# Patient Record
Sex: FEMALE | Race: WHITE | Employment: PART TIME | URBAN - METROPOLITAN AREA
[De-identification: names, ages, dates, MRNs, and addresses within clinical notes are randomized per-mention and may not be internally consistent; named-entity substitution may affect disease eponyms.]

---

## 2017-02-27 DIAGNOSIS — E03.9 HYPOTHYROIDISM: ICD-10-CM

## 2017-02-28 RX ORDER — LEVOTHYROXINE SODIUM 175 UG/1
TABLET ORAL
Qty: 90 TABLET | Refills: 1 | Status: SHIPPED | OUTPATIENT
Start: 2017-02-28 | End: 2017-11-29

## 2017-02-28 NOTE — TELEPHONE ENCOUNTER
Prescription approved per [facility], [facility] or [facility] refill protocol.  Triny Encinas RN  [employer]

## 2017-02-28 NOTE — TELEPHONE ENCOUNTER
Levothyroxine 175 mcg     Last Written Prescription Date: 2/1/16  Last Quantity: 90, # refills: 3  Last Office Visit with G, P or [facility] prescribing provider: 12/9/16        TSH   Date Value Ref Range Status   09/21/2016 2.74 0.40 - 5.00 mU/L Final

## 2017-09-03 DIAGNOSIS — E03.9 HYPOTHYROIDISM: ICD-10-CM

## 2017-09-06 RX ORDER — LEVOTHYROXINE SODIUM 175 UG/1
TABLET ORAL
Qty: 90 TABLET | Refills: 0 | Status: SHIPPED | OUTPATIENT
Start: 2017-09-06 | End: 2017-11-29

## 2017-09-06 NOTE — TELEPHONE ENCOUNTER
Medication is being filled for 1 time refill only due to:  Patient needs labs needs thyroid check call for appointment.

## 2017-11-19 DIAGNOSIS — E27.1 AUTOIMMUNE ADDISON'S DISEASE (H): ICD-10-CM

## 2017-11-19 DIAGNOSIS — E03.9 HYPOTHYROIDISM: ICD-10-CM

## 2017-11-21 RX ORDER — PREDNISONE 5 MG/1
TABLET ORAL
Qty: 180 TABLET | Refills: 3 | Status: SHIPPED | OUTPATIENT
Start: 2017-11-21 | End: 2017-11-29

## 2017-11-21 RX ORDER — LEVOTHYROXINE SODIUM 175 UG/1
TABLET ORAL
Qty: 90 TABLET | Refills: 0 | Status: SHIPPED | OUTPATIENT
Start: 2017-11-21 | End: 2017-11-29

## 2017-11-21 NOTE — TELEPHONE ENCOUNTER
12-9-2016 last OV  Requested Prescriptions   Pending Prescriptions Disp Refills     levothyroxine (SYNTHROID/LEVOTHROID) 175 MCG tablet [Pharmacy Med Name: LEVOTHYROXINE SODIUM 175MCG TABS] 90 tablet 0     Sig: TAKE ONE TABLET BY MOUTH EVERY DAY (NEED TO BE SEEN IN CLINIC FOR FURTHER REFILLS)    Thyroid Protocol Failed    11/19/2017 11:20 AM       Failed - Normal TSH on file in past 12 months    Recent Labs   Lab Test  09/21/16   1614   TSH  2.74             Passed - Patient is 12 years or older       Passed - Recent or future visit with authorizing provider's specialty    Patient had office visit in the last year or has a visit in the next 30 days with authorizing provider.  See chart review.              Passed - No active pregnancy on record    If patient is pregnant or has had a positive pregnancy test, please check TSH.         Passed - No positive pregnancy test in past 12 months    If patient is pregnant or has had a positive pregnancy test, please check TSH.          predniSONE (DELTASONE) 5 MG tablet [Pharmacy Med Name: PREDNISONE 5MG TABS] 180 tablet 3     Sig: TAKE ONE TO ONE AND ONE-HALF TABLETS BY MOUTH DAILY    There is no refill protocol information for this order        Prednisone--patient has an appointment 11-29-17      Last Written Prescription Date:  3-22-17  Last Fill Quantity: 135,   # refills: 2   Office visit:  12-9-2016 for pre op exam  Next 5 appointments (look out 90 days)     Nov 29, 2017  8:00 AM CST   [facility]    [address] [address] [phone]                   Routing refill request to provider for review/approval because:  Drug not on the [facility], [facility] or [facility] refill protocol or controlled substance

## 2017-11-29 ENCOUNTER — OFFICE VISIT (OUTPATIENT)
Dept: FAMILY MEDICINE | Facility: CLINIC | Age: 47
End: 2017-11-29
Payer: COMMERCIAL

## 2017-11-29 VITALS
OXYGEN SATURATION: 98 % | SYSTOLIC BLOOD PRESSURE: 92 MMHG | RESPIRATION RATE: 16 BRPM | DIASTOLIC BLOOD PRESSURE: 62 MMHG | HEIGHT: 69 IN | TEMPERATURE: 96.8 F | BODY MASS INDEX: 21.55 KG/M2 | HEART RATE: 53 BPM | WEIGHT: 145.5 LBS

## 2017-11-29 DIAGNOSIS — Z00.00 ROUTINE GENERAL MEDICAL EXAMINATION AT A HEALTH CARE FACILITY: Primary | ICD-10-CM

## 2017-11-29 DIAGNOSIS — E27.40 CHRONIC ADRENAL INSUFFICIENCY (H): ICD-10-CM

## 2017-11-29 DIAGNOSIS — E06.3 HYPOTHYROIDISM DUE TO HASHIMOTO'S THYROIDITIS: ICD-10-CM

## 2017-11-29 DIAGNOSIS — Z97.5 IUD (INTRAUTERINE DEVICE) IN PLACE: ICD-10-CM

## 2017-11-29 DIAGNOSIS — E03.9 ACQUIRED HYPOTHYROIDISM: ICD-10-CM

## 2017-11-29 DIAGNOSIS — E27.1 AUTOIMMUNE ADDISON'S DISEASE (H): ICD-10-CM

## 2017-11-29 LAB — TSH SERPL DL<=0.005 MIU/L-ACNC: 3.14 MU/L (ref 0.4–4)

## 2017-11-29 PROCEDURE — 99396 PREV VISIT EST AGE 40-64: CPT | Performed by: FAMILY MEDICINE

## 2017-11-29 PROCEDURE — 36415 COLL VENOUS BLD VENIPUNCTURE: CPT | Performed by: FAMILY MEDICINE

## 2017-11-29 PROCEDURE — 87624 HPV HI-RISK TYP POOLED RSLT: CPT | Performed by: FAMILY MEDICINE

## 2017-11-29 PROCEDURE — G0145 SCR C/V CYTO,THINLAYER,RESCR: HCPCS | Performed by: FAMILY MEDICINE

## 2017-11-29 PROCEDURE — 84443 ASSAY THYROID STIM HORMONE: CPT | Performed by: FAMILY MEDICINE

## 2017-11-29 RX ORDER — PREDNISONE 5 MG/1
TABLET ORAL
Qty: 180 TABLET | Refills: 3 | Status: SHIPPED | OUTPATIENT
Start: 2017-11-29 | End: 2018-05-05

## 2017-11-29 RX ORDER — LEVOTHYROXINE SODIUM 175 UG/1
175 TABLET ORAL DAILY
Qty: 90 TABLET | Refills: 3 | Status: SHIPPED | OUTPATIENT
Start: 2017-11-29 | End: 2018-08-21

## 2017-11-29 RX ORDER — FLUDROCORTISONE ACETATE 0.1 MG/1
TABLET ORAL
Qty: 270 TABLET | Refills: 2 | Status: SHIPPED | OUTPATIENT
Start: 2017-11-29 | End: 2018-08-21

## 2017-11-29 NOTE — PATIENT INSTRUCTIONS
You need a mammogram!  Please call to schedule this.  [facility] Mammogram every Friday morning at our clinic [phone]  or  [facility]   Appointment line [phone]  or  [facility] Appointment line [phone]    Preventive Health Recommendations  Female Ages 40 to 49    Yearly exam:     See your health care provider every year in order to  1. Review health changes.   2. Discuss preventive care.    3. Review your medicines if your doctor prescribed any.      Get a Pap test every three years (unless you have an abnormal result and your provider advises testing more often).      If you get Pap tests with HPV test, you only need to test every 5 years, unless you have an abnormal result. You do not need a Pap test if your uterus was removed (hysterectomy) and you have not had cancer.      You should be tested each year for STDs (sexually transmitted diseases), if you're at risk.       Ask your doctor if you should have a mammogram.      Have a colonoscopy (test for colon cancer) if someone in your family has had colon cancer or polyps before age 50.       Have a cholesterol test every 5 years.       Have a diabetes test (fasting glucose) after age 45. If you are at risk for diabetes, you should have this test every 3 years.    Shots: Get a flu shot each year. Get a tetanus shot every 10 years.     Nutrition:     Eat at least 5 servings of fruits and vegetables each day.    Eat whole-grain bread, whole-wheat pasta and brown rice instead of white grains and rice.    Talk to your provider about Calcium and Vitamin D.     Lifestyle    Exercise at least 150 minutes a week (an average of 30 minutes a day, 5 days a week). This will help you control your weight and prevent disease.    Limit alcohol to one drink per day.    No smoking.     Wear sunscreen to prevent skin cancer.    See your dentist every six months for an exam and cleaning.

## 2017-11-29 NOTE — LETTER
December 7, 2017    Triny Florian  [address] [facility] [facility] [address]    Dear Triny,  We are happy to inform you that your PAP smear result from 11/29/17 is normal.  We are now able to do a follow up test on PAP smears. The DNA test is for HPV (Human Papilloma Virus). Cervical cancer is closely linked with certain types of HPV. Your result showed no evidence of high risk HPV.  Therefore we recommend you return in 5 years for your next pap smear and HPV test.  You will still need to return to the clinic every year for an annual exam and other preventive tests.  Please contact the clinic at [phone] with any questions.  Sincerely,    Mishel Mosher MD/[facility]

## 2017-11-29 NOTE — NURSING NOTE
"Chief Complaint   Patient presents with     Physical       Initial BP 92/62  Pulse 53  Temp 96.8  F (36  C) (Tympanic)  Resp 16  Ht 5' 9\" (1.753 m)  Wt 145 lb 8 oz (66 kg)  SpO2 98%  BMI 21.49 kg/m2 Estimated body mass index is 21.49 kg/(m^2) as calculated from the following:    Height as of this encounter: 5' 9\" (1.753 m).    Weight as of this encounter: 145 lb 8 oz (66 kg).  Medication Reconciliation: complete   .Atiya LE      "

## 2017-11-29 NOTE — MR AVS SNAPSHOT
After Visit Summary   11/29/2017    Triny Florian    MRN: [id number]           Patient Information     Date Of Birth          1970        Visit Information        Provider Department      11/29/2017 8:00 AM Mishel Mosher MD [facility]        Today's Diagnoses     Routine general medical examination at a health care facility    -  1    Autoimmune [place]'s disease (H)        Acquired hypothyroidism        Corticoadrenal insufficiency        IUD (intrauterine device) in place          Care Instructions    You need a mammogram!  Please call to schedule this.  [facility] Mammogram every Friday morning at our clinic [phone]  or  [facility]   Appointment line [phone]  or  [facility] Appointment line [phone]    Preventive Health Recommendations  Female Ages 40 to 49    Yearly exam:     See your health care provider every year in order to  1. Review health changes.   2. Discuss preventive care.    3. Review your medicines if your doctor prescribed any.      Get a Pap test every three years (unless you have an abnormal result and your provider advises testing more often).      If you get Pap tests with HPV test, you only need to test every 5 years, unless you have an abnormal result. You do not need a Pap test if your uterus was removed (hysterectomy) and you have not had cancer.      You should be tested each year for STDs (sexually transmitted diseases), if you're at risk.       Ask your doctor if you should have a mammogram.      Have a colonoscopy (test for colon cancer) if someone in your family has had colon cancer or polyps before age 50.       Have a cholesterol test every 5 years.       Have a diabetes test (fasting glucose) after age 45. If you are at risk for diabetes, you should have this test every 3 years.    Shots: Get a flu shot each year. Get a tetanus shot every 10 years.     Nutrition:  "    Eat at least 5 servings of fruits and vegetables each day.    Eat whole-grain bread, whole-wheat pasta and brown rice instead of white grains and rice.    Talk to your provider about Calcium and Vitamin D.     Lifestyle    Exercise at least 150 minutes a week (an average of 30 minutes a day, 5 days a week). This will help you control your weight and prevent disease.    Limit alcohol to one drink per day.    No smoking.     Wear sunscreen to prevent skin cancer.    See your dentist every six months for an exam and cleaning.          Follow-ups after your visit        Who to contact     If you have questions or need follow up information about today's clinic visit or your schedule please contact [facility] directly at [phone].  Normal or non-critical lab and imaging results will be communicated to you by PharmAssistanthart, letter or phone within 4 business days after the clinic has received the results. If you do not hear from us within 7 days, please contact the clinic through PharmAssistanthart or phone. If you have a critical or abnormal lab result, we will notify you by phone as soon as possible.  Submit refill requests through [employer] or call your pharmacy and they will forward the refill request to us. Please allow 3 business days for your refill to be completed.          Additional Information About Your Visit        [employer] Information     [employer] lets you send messages to your doctor, view your test results, renew your prescriptions, schedule appointments and more. To sign up, go to [email or web address] . Click on \"Log in\" on the left side of the screen, which will take you to the Welcome page. Then click on \"Sign up Now\" on the right side of the page.     You will be asked to enter the access code listed below, as well as some personal information. Please follow the directions to create your username and password.     Your access code is: [email or web address]  Expires: 2/27/2018  8:35 AM   " "  Your access code will  in 90 days. If you need help or a new code, please call your [place] clinic or [phone].        Care EveryWhere ID     This is your Care EveryWhere ID. This could be used by other organizations to access your [place] medical records  [id number]        Your Vitals Were     Pulse Temperature Respirations Height Pulse Oximetry BMI (Body Mass Index)    53 96.8  F (36  C) (Tympanic) 16 5' 9\" (1.753 m) 98% 21.49 kg/m2       Blood Pressure from Last 3 Encounters:   17 92/62   16 104/60   16 98/58    Weight from Last 3 Encounters:   17 145 lb 8 oz (66 kg)   16 162 lb (73.5 kg)   16 154 lb (69.9 kg)              We Performed the Following     HPV High Risk Types DNA Cervical     Pap imaged thin layer screen with HPV - recommended age 30 - 65 years (select HPV order below)     TSH with free T4 reflex          Today's Medication Changes          These changes are accurate as of: 17  8:35 AM.  If you have any questions, ask your nurse or doctor.               These medicines have changed or have updated prescriptions.        Dose/Directions    levothyroxine 175 MCG tablet   Commonly known as:  SYNTHROID/LEVOTHROID   This may have changed:  Another medication with the same name was removed. Continue taking this medication, and follow the directions you see here.   Used for:  Hypothyroidism   Changed by:  Mishel Mosher MD        TAKE ONE TABLET BY MOUTH EVERY DAY (NEED TO BE SEEN IN CLINIC FOR FURTHER REFILLS)   Quantity:  90 tablet   Refills:  0       predniSONE 5 MG tablet   Commonly known as:  DELTASONE   This may have changed:  Another medication with the same name was removed. Continue taking this medication, and follow the directions you see here.   Used for:  Autoimmune Bernard's disease (H)   Changed by:  Mishel Mosher MD        TAKE ONE TO ONE AND ONE-HALF TABLETS BY MOUTH DAILY   Quantity:  180 tablet   Refills:  3         Stop " taking these medicines if you haven't already. Please contact your care team if you have questions.     promethazine 25 MG tablet   Commonly known as:  PHENERGAN   Stopped by:  Mishel Mosher MD                    Primary Care Provider Office Phone # Fax #    Mishel Mosher [phone] [address] [facility] [address]        Equal Access to Services     [facility]: Hadii aad ku hadasho Soomaali, waaxda luqadaha, qaybta kaalmada adeegyada, waxay idiin hayaan adeeg kharash la'aan . So [facility] [phone].    ATENCIÓN: Si habla español, tiene a beck disposición servicios gratuitos de asistencia lingüística. Melbaame al [phone].    We comply with applicable federal civil rights laws and [place] laws. We do not discriminate on the basis of race, color, national origin, age, disability, sex, sexual orientation, or gender identity.            Thank you!     Thank you for choosing [facility]  for your care. Our goal is always to provide you with excellent care. Hearing back from our patients is one way we can continue to improve our services. Please take a few minutes to complete the written survey that you may receive in the mail after your visit with us. Thank you!             Your Updated Medication List - Protect others around you: Learn how to safely use, store and throw away your medicines at www.disposemymeds.org.          This list is accurate as of: 11/29/17  8:35 AM.  Always use your most recent med list.                   Brand Name Dispense Instructions for use Diagnosis    dexamethasone 4 MG/ML injection    DECADRON     Apply 4 mg topically once. IM injection for emergency only    Autoimmune Harding's disease (H)       fludrocortisone 0.1 MG tablet    FLORINEF    270 tablet    TAKE ONE TO THREE TABLETS BY MOUTH DAILY AS NEEDED    Autoimmune Harding's disease (H)       levothyroxine 175 MCG tablet    SYNTHROID/LEVOTHROID    90 tablet     TAKE ONE TABLET BY MOUTH EVERY DAY (NEED TO BE SEEN IN CLINIC FOR FURTHER REFILLS)    Hypothyroidism       predniSONE 5 MG tablet    DELTASONE    180 tablet    TAKE ONE TO ONE AND ONE-HALF TABLETS BY MOUTH DAILY    Autoimmune [place]'s disease (H)       valACYclovir 1000 mg tablet    VALTREX    30 tablet    Take 1 tablet (1,000 mg) by mouth 2 times daily    Recurrent herpes simplex

## 2017-11-29 NOTE — PROGRESS NOTES
SUBJECTIVE:   CC: Triny Florian is an 47 year old woman who presents for preventive health visit.     Physical   Annual:     Getting at least 3 servings of Calcium per day::  NO    Bi-annual eye exam::  Yes    Dental care twice a year::  Yes    Sleep apnea or symptoms of sleep apnea::  None    Diet::  Regular (no restrictions)    Frequency of exercise::  4-5 days/week    Duration of exercise::  45-60 minutes    Taking medications regularly::  Yes    Medication side effects::  Not applicable    Additional concerns today::  No              Today's PHQ-2 Score:   PHQ-2 ( 1999 [employer]) 11/29/2017   Q1: Little interest or pleasure in doing things 0   Q2: Feeling down, depressed or hopeless 0   PHQ-2 Score 0   Q1: Little interest or pleasure in doing things Not at all   Q2: Feeling down, depressed or hopeless Not at all   PHQ-2 Score 0       Abuse: Current or Past(Physical, Sexual or Emotional)- No  Do you feel safe in your environment - Yes    Social History   Substance Use Topics     Smoking status: Never Smoker     Smokeless tobacco: Never Used     Alcohol use 0.0 oz/week     0 Standard drinks or equivalent per week      Comment: socially     The patient does not drink >3 drinks per day nor >7 drinks per week.    Reviewed orders with patient.  Reviewed health maintenance and updated orders accordingly - Yes  Labs reviewed in EPIC  BP Readings from Last 3 Encounters:   11/29/17 92/62   12/09/16 104/60   09/21/16 98/58    Wt Readings from Last 3 Encounters:   11/29/17 145 lb 8 oz (66 kg)   12/09/16 162 lb (73.5 kg)   09/21/16 154 lb (69.9 kg)                      Patient under age 50, mutual decision reflected in health maintenance.        Pertinent mammograms are reviewed under the imaging tab.  History of abnormal Pap smear: NO - age 30- 65 PAP every 3 years recommended  NO - age 30-65 PAP every 5 years with negative HPV co-testing recommended  Last 3 Pap and HPV Results:   PAP / HPV 1/14/2015 5/23/2012   PAP NIL  "NIL       Reviewed and updated as needed this visit by clinical staff  Tobacco  Allergies  Meds  Problems  Med Hx  Surg Hx  Fam Hx  Soc Hx          Reviewed and updated as needed this visit by Provider  Tobacco  Allergies  Meds  Problems            Review of Systems  C: NEGATIVE for fever, chills, change in weight  I: NEGATIVE for worrisome rashes, moles or lesions  E: NEGATIVE for vision changes or irritation  ENT: NEGATIVE for ear, mouth and throat problems  R: NEGATIVE for significant cough or SOB  B: NEGATIVE for masses, tenderness or discharge  CV: NEGATIVE for chest pain, palpitations or peripheral edema  GI: NEGATIVE for nausea, abdominal pain, heartburn, or change in bowel habits  : NEGATIVE for unusual urinary or vaginal symptoms. Periods are regular.  M: NEGATIVE for significant arthralgias or myalgia  N: NEGATIVE for weakness, dizziness or paresthesias  P: NEGATIVE for changes in mood or affect     OBJECTIVE:   BP 92/62  Pulse 53  Temp 96.8  F (36  C) (Tympanic)  Resp 16  Ht 5' 9\" (1.753 m)  Wt 145 lb 8 oz (66 kg)  LMP 11/29/2017  SpO2 98%  BMI 21.49 kg/m2  Physical Exam  GENERAL: healthy, alert and no distress  EYES: Eyes grossly normal to inspection, PERRL and conjunctivae and sclerae normal  HENT: ear canals and TM's normal, nose and mouth without ulcers or lesions  NECK: no adenopathy, no asymmetry, masses, or scars and thyroid normal to palpation  RESP: lungs clear to auscultation - no rales, rhonchi or wheezes  BREAST: normal without masses, tenderness or nipple discharge and no palpable axillary masses or adenopathy  CV: regular rate and rhythm, normal S1 S2, no S3 or S4, no murmur, click or rub, no peripheral edema and peripheral pulses strong  ABDOMEN: soft, nontender, no hepatosplenomegaly, no masses and bowel sounds normal   (female): normal female external genitalia, normal urethral meatus, vaginal mucosa pink, moist, well rugated, and normal cervix/adnexa/uterus without " "masses or discharge  MS: no gross musculoskeletal defects noted, no edema  SKIN: no suspicious lesions or rashes  NEURO: Normal strength and tone, mentation intact and speech normal  PSYCH: mentation appears normal, affect normal/bright    ASSESSMENT/PLAN:   Triny was seen today for physical.    Diagnoses and all orders for this visit:    Routine general medical examination at a health care facility  -     Pap imaged thin layer screen with HPV - recommended age 30 - 65 years (select HPV order below)  -     HPV High Risk Types DNA Cervical    Autoimmune Dukes's disease (H)  -     fludrocortisone (FLORINEF) 0.1 MG tablet; TAKE ONE TO THREE TABLETS BY MOUTH DAILY AS NEEDED  -     predniSONE (DELTASONE) 5 MG tablet; TAKE ONE TO ONE AND ONE-HALF TABLETS BY MOUTH DAILY    Acquired hypothyroidism  -     TSH with free T4 reflex    Corticoadrenal insufficiency    IUD (intrauterine device) in place    Autoimmune Dukes's disease (H)  Comments:  diagnosed in 1998.  was on TPN.  thought was hyperemesis.  long diagnosis - difficult to diagnose.  Orders:  -     fludrocortisone (FLORINEF) 0.1 MG tablet; TAKE ONE TO THREE TABLETS BY MOUTH DAILY AS NEEDED  -     predniSONE (DELTASONE) 5 MG tablet; TAKE ONE TO ONE AND ONE-HALF TABLETS BY MOUTH DAILY    Hypothyroidism due to Hashimoto's thyroiditis  -     levothyroxine (SYNTHROID/LEVOTHROID) 175 MCG tablet; Take 1 tablet (175 mcg) by mouth daily      No additional charges; routine refills for Bernard's and Hypothyroid without changes.  Labs for thyroid done.    COUNSELING:  Reviewed preventive health counseling, as reflected in patient instructions         reports that she has never smoked. She has never used smokeless tobacco.    Estimated body mass index is 21.49 kg/(m^2) as calculated from the following:    Height as of this encounter: 5' 9\" (1.753 m).    Weight as of this encounter: 145 lb 8 oz (66 kg).         Counseling Resources:  ATP IV Guidelines  Pooled Cohorts Equation " Calculator  Breast Cancer Risk Calculator  FRAX Risk Assessment  ICSI Preventive Guidelines  Dietary Guidelines for Americans, 2010  [employer]'s MyPlate  ASA Prophylaxis  Lung CA Screening    Mishel Mosher MD  [facility] submitted by the patient on 11/29/2017   PHQ-2 Score: 0

## 2017-11-30 LAB
COPATH REPORT: NORMAL
PAP: NORMAL

## 2017-12-01 LAB
FINAL DIAGNOSIS: NORMAL
HPV HR 12 DNA CVX QL NAA+PROBE: NEGATIVE
HPV16 DNA SPEC QL NAA+PROBE: NEGATIVE
HPV18 DNA SPEC QL NAA+PROBE: NEGATIVE
SPECIMEN DESCRIPTION: NORMAL

## 2017-12-11 ENCOUNTER — HOSPITAL ENCOUNTER (OUTPATIENT)
Dept: MAMMOGRAPHY | Facility: CLINIC | Age: 47
Discharge: HOME | End: 2017-12-11
Attending: FAMILY MEDICINE | Admitting: FAMILY MEDICINE
Payer: COMMERCIAL

## 2017-12-11 DIAGNOSIS — Z12.31 VISIT FOR SCREENING MAMMOGRAM: ICD-10-CM

## 2017-12-11 PROCEDURE — G0202 SCR MAMMO BI INCL CAD: HCPCS

## 2018-01-26 ENCOUNTER — OFFICE VISIT (OUTPATIENT)
Dept: FAMILY MEDICINE | Facility: CLINIC | Age: 48
End: 2018-01-26
Payer: COMMERCIAL

## 2018-01-26 ENCOUNTER — TELEPHONE (OUTPATIENT)
Dept: FAMILY MEDICINE | Facility: CLINIC | Age: 48
End: 2018-01-26

## 2018-01-26 VITALS
SYSTOLIC BLOOD PRESSURE: 94 MMHG | BODY MASS INDEX: 21.71 KG/M2 | WEIGHT: 147 LBS | RESPIRATION RATE: 16 BRPM | TEMPERATURE: 99.8 F | DIASTOLIC BLOOD PRESSURE: 62 MMHG | HEART RATE: 69 BPM | OXYGEN SATURATION: 97 %

## 2018-01-26 DIAGNOSIS — J01.10 ACUTE NON-RECURRENT FRONTAL SINUSITIS: Primary | ICD-10-CM

## 2018-01-26 DIAGNOSIS — R05.9 COUGH: ICD-10-CM

## 2018-01-26 LAB
FLUAV+FLUBV AG SPEC QL: NEGATIVE
FLUAV+FLUBV AG SPEC QL: NEGATIVE
SPECIMEN SOURCE: NORMAL

## 2018-01-26 PROCEDURE — 99213 OFFICE O/P EST LOW 20 MIN: CPT | Performed by: FAMILY MEDICINE

## 2018-01-26 PROCEDURE — 87804 INFLUENZA ASSAY W/OPTIC: CPT | Performed by: FAMILY MEDICINE

## 2018-01-26 RX ORDER — AMOXICILLIN AND CLAVULANATE POTASSIUM 500; 125 MG/1; MG/1
1 TABLET, FILM COATED ORAL 3 TIMES DAILY
Qty: 30 TABLET | Refills: 0 | Status: SHIPPED | OUTPATIENT
Start: 2018-01-26 | End: 2018-05-05

## 2018-01-26 NOTE — MR AVS SNAPSHOT
After Visit Summary   1/26/2018    Triny Florian    MRN: [id number]           Patient Information     Date Of Birth          1970        Visit Information        Provider Department      1/26/2018 1:50 PM Emma Hui DO [facility]        Today's Diagnoses     Acute non-recurrent frontal sinusitis    -  1    Cough          Care Instructions      Acute Bacterial Rhinosinusitis (ABRS)    Acute bacterial rhinosinusitis (ABRS) is an infection of your nasal cavity and sinuses. It s caused by bacteria. Acute means that you ve had symptoms for less than 4 weeks, but possibly up to 12 weeks.  Understanding your sinuses  The nasal cavity is the large air-filled space behind your nose. The sinuses are a group of spaces formed by the bones of your face. They connect with your nasal cavity. ABRS causes the tissue lining these spaces to become inflamed. Mucus may not drain normally. This leads to facial pain and other symptoms.  What causes ABRS?  ABRS most often follows an upper respiratory infection caused by a virus. Bacteria then infect the lining of your nasal cavity and sinuses. But you can also get ABRS if you have:    Nasal allergies    Long-term nasal swelling and congestion not caused by allergies    Blockage in the nose  Symptoms of ABRS  The symptoms of ABRS may be different for each person and include:    Nasal congestion or blockage    Pain or pressure in the face    Thick, colored drainage from the nose  Other symptoms may include:    Runny nose    Fluid draining from the nose down the throat (postnasal drip)    Headache    Cough    Pain    Fever  Diagnosing ABRS  ABRS may be diagnosed if you ve had an upper respiratory infection like a cold and cough for 10 or more days without improvement or with worsening symptoms. Your healthcare provider will ask about your symptoms and your medical history. The provider will check your vital signs, including your  temperature. You ll have a physical exam. The healthcare provider will check your ears, nose, and throat. You likely won t need any tests. If ABRS comes back, you may have a culture or other tests.  Treatment for ABRS  Treatment may include:    Antibiotic medicine. This is for symptoms that last for at least 10 to 14 days.    Nasal corticosteroid medicine. Drops or spray used in the nose can lessen swelling and congestion.    Over-the-counter pain medicine. This is to lessen sinus pain and pressure.    Nasal decongestant medicine. Spray or drops may help to lessen congestion. Do not use them for more than a few days.    Salt wash (saline irrigation). This can help to loosen mucus.  Possible complications of ABRS  ABRS may come back or become long-term (chronic). In rare cases, ABRS may cause complications such as:     Inflamed tissue around the brain and spinal cord (meningitis)    Inflamed tissue around the eyes (orbital cellulitis)    Inflamed bones around the sinuses (osteitis)  These problems may need to be treated in a hospital with intravenous (IV) antibiotic medicine or surgery.  When to call the healthcare provider  Call your healthcare provider if you have any of the following:    Symptoms that don t get better, or get worse    Symptoms that don t get better after 3 to 5 days on antibiotics    Trouble seeing    Swelling around your eyes    Confusion or trouble staying awake   Date Last Reviewed: 5/1/2017 2000-2017 [employer]. [address] [place] [address]. All rights reserved. This information is not intended as a substitute for professional medical care. Always follow your healthcare professional's instructions.                Follow-ups after your visit        Follow-up notes from your care team     Return if symptoms worsen or fail to improve.      Who to contact     If you have questions or need follow up information about today's clinic visit or your schedule please  "contact [facility] directly at [phone].  Normal or non-critical lab and imaging results will be communicated to you by MyChart, letter or phone within 4 business days after the clinic has received the results. If you do not hear from us within 7 days, please contact the clinic through MyChart or phone. If you have a critical or abnormal lab result, we will notify you by phone as soon as possible.  Submit refill requests through [employer] or call your pharmacy and they will forward the refill request to us. Please allow 3 business days for your refill to be completed.          Additional Information About Your Visit        [employer] Information     [employer] lets you send messages to your doctor, view your test results, renew your prescriptions, schedule appointments and more. To sign up, go to [email or web address] . Click on \"Log in\" on the left side of the screen, which will take you to the Welcome page. Then click on \"Sign up Now\" on the right side of the page.     You will be asked to enter the access code listed below, as well as some personal information. Please follow the directions to create your username and password.     Your access code is: [email or web address]  Expires: 2018  8:35 AM     Your access code will  in 90 days. If you need help or a new code, please call your [place] clinic or [phone].        Care EveryWhere ID     This is your Care EveryWhere ID. This could be used by other organizations to access your [place] medical records  [id number]        Your Vitals Were     Pulse Temperature Respirations Pulse Oximetry Breastfeeding? BMI (Body Mass Index)    69 99.8  F (37.7  C) (Tympanic) 16 97% No 21.71 kg/m2       Blood Pressure from Last 3 Encounters:   18 94/62   17 92/62   16 104/60    Weight from Last 3 Encounters:   18 147 lb (66.7 kg)   17 145 lb 8 oz (66 kg)   16 162 lb (73.5 kg)              We Performed the " Following     Influenza A/B antigen          Today's Medication Changes          These changes are accurate as of 1/26/18  2:20 PM.  If you have any questions, ask your nurse or doctor.               Start taking these medicines.        Dose/Directions    amoxicillin-clavulanate 500-125 MG per tablet   Commonly known as:  AUGMENTIN   Used for:  Acute non-recurrent frontal sinusitis   Started by:  Emma Hui,         Dose:  1 tablet   Take 1 tablet by mouth 3 times daily   Quantity:  30 tablet   Refills:  0            Where to get your medicines      These medications were sent to [facility] [id number] [place] [address] [address] AT [facility] [address] [facility] [address]     Phone:  [phone]     amoxicillin-clavulanate 500-125 MG per tablet                Primary Care Provider Office Phone # Fax #    Mishel Mosher [phone] [address] [facility] [address]        Equal Access to Services     KIARA VIERA : Hadii aad ku hadasho Soomaali, waaxda luqadaha, qaybta kaalmada adeegyada, waxay idiin hayaan meggan arvizu . So [facility] [phone].    ATENCIÓN: Si habla español, tiene a beck disposición servicios gratuitos de asistencia lingüística. Chaparrita al [phone].    We comply with applicable federal civil rights laws and [place] laws. We do not discriminate on the basis of race, color, national origin, age, disability, sex, sexual orientation, or gender identity.            Thank you!     Thank you for choosing [facility]  for your care. Our goal is always to provide you with excellent care. Hearing back from our patients is one way we can continue to improve our services. Please take a few minutes to complete the written survey that you may receive in the mail after your visit with us. Thank you!             Your Updated Medication List - Protect others around you: Learn how to safely  use, store and throw away your medicines at www.disposemymeds.org.          This list is accurate as of 1/26/18  2:20 PM.  Always use your most recent med list.                   Brand Name Dispense Instructions for use Diagnosis    amoxicillin-clavulanate 500-125 MG per tablet    AUGMENTIN    30 tablet    Take 1 tablet by mouth 3 times daily    Acute non-recurrent frontal sinusitis       dexamethasone 4 MG/ML injection    DECADRON     Apply 4 mg topically once. IM injection for emergency only    Autoimmune Bernard's disease (H)       fludrocortisone 0.1 MG tablet    FLORINEF    270 tablet    TAKE ONE TO THREE TABLETS BY MOUTH DAILY AS NEEDED    Autoimmune Atglen's disease (H)       levothyroxine 175 MCG tablet    SYNTHROID/LEVOTHROID    90 tablet    Take 1 tablet (175 mcg) by mouth daily    Hypothyroidism due to Hashimoto's thyroiditis       predniSONE 5 MG tablet    DELTASONE    180 tablet    TAKE ONE TO ONE AND ONE-HALF TABLETS BY MOUTH DAILY    Autoimmune Bernard's disease (H)       valACYclovir 1000 mg tablet    VALTREX    30 tablet    Take 1 tablet (1,000 mg) by mouth 2 times daily    Recurrent herpes simplex

## 2018-01-26 NOTE — TELEPHONE ENCOUNTER
Reason for call:  Patient reporting a symptom    Symptom or request: cough, fever, sore throat, body aches    Duration (how long have symptoms been present): 5 days    Have you been treated for this before? No    Additional comments: Please call patient to advise. Poss flu    Phone Number patient can be reached at:  Home number on file [phone] (home)    Best Time:  any    Can we leave a detailed message on this number:  YES    Call taken on 1/26/2018 at 8:42 AM by BRENDA SALDIVAR

## 2018-01-26 NOTE — NURSING NOTE
"Chief Complaint   Patient presents with     URI     Cough, fatigue, sinus congestion, bodyaches       Initial BP 94/62 (BP Location: Left arm, Patient Position: Sitting, Cuff Size: Adult Regular)  Pulse 69  Temp 99.8  F (37.7  C) (Tympanic)  Resp 16  Wt 147 lb (66.7 kg)  SpO2 97%  Breastfeeding? No  BMI 21.71 kg/m2 Estimated body mass index is 21.71 kg/(m^2) as calculated from the following:    Height as of 11/29/17: 5' 9\" (1.753 m).    Weight as of this encounter: 147 lb (66.7 kg).  Medication Reconciliation: complete     Diana Copeland LPN  "

## 2018-01-26 NOTE — LETTER
January 26, 2018      Triny Florian  [address] [facility] [address]        Dear Ms.Anival,    We are writing to inform you of your test results.    Your Influenza test is NEGATIVE.    Resulted Orders   Influenza A/B antigen   Result Value Ref Range    Influenza A/B Agn Specimen Nasal     Influenza A Negative NEG^Negative    Influenza B Negative NEG^Negative      Comment:      Test results must be correlated with clinical data. If necessary, results   should be confirmed by a molecular assay or viral culture.         If you have any questions or concerns, please call the clinic at the number listed above.       Sincerely,        Emma Hui, DO

## 2018-01-26 NOTE — TELEPHONE ENCOUNTER
Influenza-Like Illness (RAJENDRA) Protocol    [place]      Age: 47 year old     YOB: 1970    Are you currently sick or have you had close contact with someone who is currently sick?   Yes, this patient is currently sick.     Adult Clinical Evaluation    Is this patient experiencing ANY of the following?  Unconsciousness or unresponsiveness No   Difficulty breathing or swallowing No   Blue or dusky lips, skin, or nail beds No   Chest pain No   Severe confusion or delirium No   Seizure activity: ongoing or stopped No   Severe dehydration or signs of shock No   Patient sounds very sick on the phone No     Is this patient experiencing ANY of the following?  Fever > 104 or shaking chills No   Wheezing with minimal response to usual wheezing medications or new wheezing No   Repeated vomiting or diarrhea with signs of dehydration (no urination within last 12 hours) No   Flu-like symptoms that initially improved but returned with fever and a worse cough No   Stiff or painful neck No   Severe headache No     Does the patient have any of the following?  Measured fever > 100 degrees No   Chills or feels very warm to the touch Yes   Cough Yes   Sore throat Yes   Muscle/ body aches Yes   Headaches Yes   Fatigue (tiredness) Yes     Nursing Plan      Below are conditions which place adults at increased risk for the more severe complications of influenza.    Does this patient have ANY of the following conditions?  Chronic pulmonary disease such as asthma or COPD No   Heart disease (CHF, CAD, anticoag due to arrhythmia) No   Liver disease (hepatitis, liver failure, cirrhosis) No   Kidney disease (renal failure, insufficiency or dialysis) No   Metabolic disorder (e.g. diabetes) No   Neuromuscular disorder (EDEN GONZALEZ MD, myasthenia gravis) No   Compromised ability to handle respiratory secretions No   Hematologic disorder (e.g. sickle cell disease) No   HIV / AIDS No   Chemotherapy or radiation within the last 3  months No   Received an organ or a bone marrow transplant No   Taking Prednisone in excess of 20mg daily No   Is age 65 years or older No   Is pregnant, thinks she may be pregnant or is within two weeks after delivery No   Is a resident of a chronic care facility No   Is patient  or [place] native  No     Patient does not fall into high risk category.  Offered Home Care Education.  If no improvement in 3-5 days, patient should be seen by a provider.    If further questions/concerns or if new symptoms develop, call your PCP or [facility] as soon as possible.      Provided home care instructions    General home care instruction:      Avoid contact with people in your household who are at increased risk for more severe complications of influenza (such as pregnant women or people who have a chronic health condition, for example diabetes, heart disease, asthma, or emphysema).    Stay home from work, school, childcare or other public places until your fever (37.8 degrees Celsius [100 degrees Fahrenheit]) has been gone for at least 24 hours, except to seek medical care. (Fever should be gone without the use of fever-reducing medications.) Use a surgical mask if available, or cover your mouth and nose with a tissue if possible if you need to seek medical care. Contact your work place, school, or  as they may have longer exclusion times.    You may continue to shed virus after your fever is gone. Limit your contact with high-risk individuals for 10 days after your symptoms started and be especially careful to cover your coughs/sneezes and wash your hands.    Cover your cough and wash your hands often, and especially after coughing, sneezing, blowing your nose.    Drink plenty of fluids (such as water, broth, sports drinks, electrolyte beverages for children) to prevent dehydration.    Avoid tobacco and second hand smoke.    Get plenty of rest.    Use over-the-counter pain relievers as  needed per  instructions.    Do not give aspirin (acetylsalicylic acid) or products that contain aspirin (e.g. bismuth subsalicylate - Pepto Bismol) to children or teenagers 18 years or younger.    A small number of people with influenza do not have fever. If you have respiratory symptoms and are at increased risk for complications of influenza, contact your health care provider to discuss these symptoms.    For parents of infants:    If possible, only family members who are not sick should care for infants.    Wash your hands with soap and water, or an alcohol-based hand rub (if your hands are not visibly soiled) before caring for your infant.    Cover your mouth and nose with a tissue when coughing or sneezing, and clean your hands.    Contact a health care provider to discuss your illness within 1-2 days if you are    Pregnant    Immunocompromised    Call 911 if you experience:    Difficulty breathing or shortness of breath    Pain or pressure in the chest    Confusion or less responsive than normal    Seizure activity: ongoing or stopped    Severe dehydration or signs of shock     Blue or dusky lips, skin, or nail beds    If further questions/concerns or if new symptoms develop, call your PCP or [facility] as soon as possible.    When to seek medical attention    Contact your health care provider right away if you experience:    A painful sore throat accompanied by fever persists for more than 48 hours    Ear pain, sinus pain, persistent vomiting and/or diarrhea    Oral temperature greater than 104  Fahrenheit (40  Celsius)    Dehydration (e.g., mouth feeling dry, dizzy when sitting/standing, decreased urine output)    Severe or persistent vomiting; unable to keep fluids down    Improvement in flu-like symptoms (fever and cough or sore throat) but then return of fever and worse cough or sore throat    Not drinking enough fluid    Any other concerns not stated above      Additional  educational resources include:    http://www.[employer].com    http://www.cdc.gov/flu/  Amy Garzon

## 2018-01-26 NOTE — PATIENT INSTRUCTIONS
Acute Bacterial Rhinosinusitis (ABRS)    Acute bacterial rhinosinusitis (ABRS) is an infection of your nasal cavity and sinuses. It s caused by bacteria. Acute means that you ve had symptoms for less than 4 weeks, but possibly up to 12 weeks.  Understanding your sinuses  The nasal cavity is the large air-filled space behind your nose. The sinuses are a group of spaces formed by the bones of your face. They connect with your nasal cavity. ABRS causes the tissue lining these spaces to become inflamed. Mucus may not drain normally. This leads to facial pain and other symptoms.  What causes ABRS?  ABRS most often follows an upper respiratory infection caused by a virus. Bacteria then infect the lining of your nasal cavity and sinuses. But you can also get ABRS if you have:    Nasal allergies    Long-term nasal swelling and congestion not caused by allergies    Blockage in the nose  Symptoms of ABRS  The symptoms of ABRS may be different for each person and include:    Nasal congestion or blockage    Pain or pressure in the face    Thick, colored drainage from the nose  Other symptoms may include:    Runny nose    Fluid draining from the nose down the throat (postnasal drip)    Headache    Cough    Pain    Fever  Diagnosing ABRS  ABRS may be diagnosed if you ve had an upper respiratory infection like a cold and cough for 10 or more days without improvement or with worsening symptoms. Your healthcare provider will ask about your symptoms and your medical history. The provider will check your vital signs, including your temperature. You ll have a physical exam. The healthcare provider will check your ears, nose, and throat. You likely won t need any tests. If ABRS comes back, you may have a culture or other tests.  Treatment for ABRS  Treatment may include:    Antibiotic medicine. This is for symptoms that last for at least 10 to 14 days.    Nasal corticosteroid medicine. Drops or spray used in the nose can lessen  swelling and congestion.    Over-the-counter pain medicine. This is to lessen sinus pain and pressure.    Nasal decongestant medicine. Spray or drops may help to lessen congestion. Do not use them for more than a few days.    Salt wash (saline irrigation). This can help to loosen mucus.  Possible complications of ABRS  ABRS may come back or become long-term (chronic). In rare cases, ABRS may cause complications such as:     Inflamed tissue around the brain and spinal cord (meningitis)    Inflamed tissue around the eyes (orbital cellulitis)    Inflamed bones around the sinuses (osteitis)  These problems may need to be treated in a hospital with intravenous (IV) antibiotic medicine or surgery.  When to call the healthcare provider  Call your healthcare provider if you have any of the following:    Symptoms that don t get better, or get worse    Symptoms that don t get better after 3 to 5 days on antibiotics    Trouble seeing    Swelling around your eyes    Confusion or trouble staying awake   Date Last Reviewed: 5/1/2017 2000-2017 [employer]. [address] [place] [address], [place] [address]. All rights reserved. This information is not intended as a substitute for professional medical care. Always follow your healthcare professional's instructions.

## 2018-01-26 NOTE — PROGRESS NOTES
SUBJECTIVE:   Triny Florian is a 47 year old female who presents to clinic today for the following health issues:        RESPIRATORY SYMPTOMS      Duration: X5 days    Description  nasal congestion, cough, fever, chills, headache, fatigue/malaise and myalgias    Severity: severe    Accompanying signs and symptoms: None    History (predisposing factors):  none    Precipitating or alleviating factors: None    Therapies tried and outcome:  acetaminophen Thera flu    No hx asthma or tobacco abuse.  Allergic to erythromycin and sulfa.  Lots of sinus congestion.    Small amount of productive cough.  Normally takes 5 mg prednisone daily for hx Bernard's, started taking 15 mg daily since Monday morning which has helped.  Had been seen by Endocrinologist.  No longer sees Endo, last seen about 6 years ago.        Problem list and histories reviewed & adjusted, as indicated.  Additional history: as documented    Labs reviewed in EPIC    Reviewed and updated as needed this visit by clinical staff  Tobacco  Allergies  Meds  Problems  Med Hx  Surg Hx  Fam Hx  Soc Hx        Reviewed and updated as needed this visit by Provider  Allergies  Meds  Problems         ROS:  C: POSITIVE for hx subjective fever and chills.  No change in weight  I: NEGATIVE for worrisome rashes, moles or lesions  E: NEGATIVE for vision changes or irritation  CV: NEGATIVE for chest pain, palpitations or peripheral edema  GI: NEGATIVE for nausea, abdominal pain, heartburn, or change in bowel habits  M: POSITIVE for muscle pains. No joint pains.    H: NEGATIVE for bleeding problems      OBJECTIVE:     BP 94/62 (BP Location: Left arm, Patient Position: Sitting, Cuff Size: Adult Regular)  Pulse 69  Temp 99.8  F (37.7  C) (Tympanic)  Resp 16  Wt 147 lb (66.7 kg)  SpO2 97%  Breastfeeding? No  BMI 21.71 kg/m2  Body mass index is 21.71 kg/(m^2).   GENERAL: fatigue, very pleasant/cooperative  EYES: Eyes grossly normal to inspection, PERRL and  conjunctivae and sclerae normal  HENT: ear canals and TM's normal, mouth and nose without ulcers or lesions.    NECK: no adenopathy, no asymmetry, masses, or scars and thyroid normal to palpation  RESP: lungs clear to auscultation - no rales, rhonchi or wheezes  CV: regular rate and rhythm, normal S1 S2, no S3 or S4, no murmur, click or rub, no peripheral edema and peripheral pulses strong  SKIN: no suspicious lesions or rashes      Diagnostic Test Results:  Influenza test: NEGATIVE  ASSESSMENT/PLAN:     Problem List Items Addressed This Visit     None      Visit Diagnoses     Acute non-recurrent frontal sinusitis    -  Primary    Relevant Medications    amoxicillin-clavulanate (AUGMENTIN) 500-125 MG per tablet    Cough        Relevant Orders    Influenza A/B antigen (Completed)           --NEGATIVE for Influenza.  --Cough likely URI, recommend push fluids, rest, and symptomatic treatment as needed:  Mucinex 600 mg 2 tabs bid  Increase humidity to 30-40% in bedroom at night - vaporizer  Saline nasal spray as needed  Benadryl 25mg 1/2 - 1 hour before bed time  Maintain 8 hr minimum of sleep at night  Robitussin for cough.   --Pt requesting to have Augmentin on hand in case sinus symptoms do not get better.  Going to [place] on Thursday for 3 weeks vacation.  --Will return to clinic as needed.  See Patient Instructions for details and follow-up instructions    Emma Hui, DO  [facility]

## 2018-03-07 DIAGNOSIS — B00.9 RECURRENT HERPES SIMPLEX: ICD-10-CM

## 2018-03-07 NOTE — TELEPHONE ENCOUNTER
"Requested Prescriptions   Pending Prescriptions Disp Refills     valACYclovir (VALTREX) 1000 mg tablet  Last Written Prescription Date:  10/18/16  Last Fill Quantity: 30,  # refills: 3   Last office visit: 1/26/2018 with prescribing provider:  Korina   Future Office Visit:     30 tablet 3     Sig: Take 1 tablet (1,000 mg) by mouth 2 times daily    Antivirals for Herpes Protocol Failed    3/7/2018  1:08 PM       Failed - Normal serum creatinine on file in past 12 months    Recent Labs   Lab Test  12/09/16   1002   CR  0.98            Passed - Patient is age 12 or older       Passed - Recent (12 mo) or future (30 days) visit within the authorizing provider's specialty    Patient had office visit in the last year or has a visit in the next 30 days with authorizing provider.  See \"Patient Info\" tab in inbasket, or \"Choose Columns\" in Meds & Orders section of the refill encounter.               "

## 2018-03-09 RX ORDER — VALACYCLOVIR HYDROCHLORIDE 1 G/1
1000 TABLET, FILM COATED ORAL 2 TIMES DAILY
Qty: 30 TABLET | Refills: 0 | Status: SHIPPED | OUTPATIENT
Start: 2018-03-09 | End: 2018-06-18

## 2018-03-09 NOTE — TELEPHONE ENCOUNTER
Medication is being filled for 1 time refill only due to:  Patient needs labs done before more refills.

## 2018-05-05 ENCOUNTER — OFFICE VISIT (OUTPATIENT)
Dept: FAMILY MEDICINE | Facility: CLINIC | Age: 48
End: 2018-05-05
Payer: COMMERCIAL

## 2018-05-05 VITALS
TEMPERATURE: 96 F | DIASTOLIC BLOOD PRESSURE: 68 MMHG | SYSTOLIC BLOOD PRESSURE: 130 MMHG | HEIGHT: 69 IN | WEIGHT: 145 LBS | RESPIRATION RATE: 14 BRPM | HEART RATE: 56 BPM | BODY MASS INDEX: 21.48 KG/M2

## 2018-05-05 DIAGNOSIS — J40 BRONCHITIS: Primary | ICD-10-CM

## 2018-05-05 PROCEDURE — 99213 OFFICE O/P EST LOW 20 MIN: CPT | Performed by: FAMILY MEDICINE

## 2018-05-05 RX ORDER — AMOXICILLIN AND CLAVULANATE POTASSIUM 500; 125 MG/1; MG/1
1 TABLET, FILM COATED ORAL 3 TIMES DAILY
Qty: 30 TABLET | Refills: 0 | Status: SHIPPED | OUTPATIENT
Start: 2018-05-05 | End: 2018-08-21

## 2018-05-05 RX ORDER — CODEINE PHOSPHATE AND GUAIFENESIN 10; 100 MG/5ML; MG/5ML
1 SOLUTION ORAL EVERY 6 HOURS PRN
Qty: 120 ML | Refills: 0 | Status: SHIPPED | OUTPATIENT
Start: 2018-05-05 | End: 2018-08-21

## 2018-05-05 RX ORDER — ALBUTEROL SULFATE 90 UG/1
2 AEROSOL, METERED RESPIRATORY (INHALATION) EVERY 6 HOURS PRN
Qty: 1 INHALER | Refills: 0 | Status: SHIPPED | OUTPATIENT
Start: 2018-05-05 | End: 2018-08-21

## 2018-05-05 NOTE — PROGRESS NOTES
"  SUBJECTIVE:   Triny Florian is a 48 year old female who presents to clinic today for the following health issues:      ENT Symptoms             Symptoms: cc Present Absent Comment   Fever/Chills   x    Fatigue  x     Muscle Aches   x    Eye Irritation   x    Sneezing   x    Nasal Emigdio/Drg  x     Sinus Pressure/Pain   x    Loss of smell   x    Dental pain   x    Sore Throat  x     Swollen Glands   x    Ear Pain/Fullness  x     Cough  x     Wheeze   x    Chest Pain   x    Shortness of breath   x    Rash   x    Other   x      Symptom duration:  over 2 weeks   Symptom severity:  moderate   Treatments tried:  nyquil, ibuprofen, cough drops   Contacts:  no         Problem list and histories reviewed & adjusted, as indicated.  Additional history: as documented    Labs reviewed in EPIC    Reviewed and updated as needed this visit by clinical staff  Tobacco  Allergies  Meds  Problems  Med Hx  Surg Hx  Fam Hx  Soc Hx        Reviewed and updated as needed this visit by Provider  Allergies  Meds  Problems         ROS:  C: NEGATIVE for fever, chills, change in weight  I: NEGATIVE for worrisome rashes, moles or lesions  E: NEGATIVE for vision changes or irritation  CV: NEGATIVE for chest pain, palpitations or peripheral edema  GI: NEGATIVE for nausea, abdominal pain, heartburn, or change in bowel habits  M: NEGATIVE for significant arthralgias or myalgia  H: NEGATIVE for bleeding problems      OBJECTIVE:     /68  Pulse 56  Temp 96  F (35.6  C) (Tympanic)  Resp 14  Ht 5' 9\" (1.753 m)  Wt 145 lb (65.8 kg)  BMI 21.41 kg/m2  Body mass index is 21.41 kg/(m^2).   GENERAL: alert and no acute distress  EYES: Eyes grossly normal to inspection, PERRL and conjunctivae and sclerae normal  HENT: ear canals and TM's normal, mouth without ulcers or lesions.  +b/l boggy turbinates, no active nasal drainage.  NECK: no adenopathy, no asymmetry, masses, or scars and thyroid normal to palpation  RESP: +rhochi in both lungs " with faint expiratory wheezing.  No rales.    CV: regular rate and rhythm, normal S1 S2, no S3 or S4, no murmur, click or rub, no peripheral edema and peripheral pulses strong  SKIN: no suspicious lesions or rashes      Diagnostic Test Results:  none     ASSESSMENT/PLAN:     Problem List Items Addressed This Visit     None      Visit Diagnoses     Bronchitis    -  Primary    Relevant Medications    guaiFENesin-codeine (ROBITUSSIN AC) 100-10 MG/5ML SOLN solution    albuterol (PROAIR HFA/PROVENTIL HFA/VENTOLIN HFA) 108 (90 Base) MCG/ACT Inhaler    amoxicillin-clavulanate (AUGMENTIN) 500-125 MG per tablet           --push fluids, rest, and symptomatic treatment as needed:  Mucinex 600 mg 2 tabs bid  Increase humidity to 30-40% in bedroom at night - vaporizer  Saline nasal spray as needed  Benadryl 25mg 1/2 - 1 hour before bed time  Maintain 8 hr minimum of sleep at night  Robitussin for cough  --Will return to clinic as needed.  See Patient Instructions for details and follow-up instructions      Emma Hui,   [facility]

## 2018-05-05 NOTE — PATIENT INSTRUCTIONS

## 2018-05-05 NOTE — MR AVS SNAPSHOT
After Visit Summary   5/5/2018    Triny Florian    MRN: [id number]           Patient Information     Date Of Birth          1970        Visit Information        Provider Department      5/5/2018 11:20 AM Emma Hui DO [facility]        Today's Diagnoses     Bronchitis    -  1      Care Instructions      Acute Bronchitis  Your healthcare provider has told you that you have acute bronchitis. Bronchitis is infection or inflammation of the bronchial tubes (airways in the lungs). Normally, air moves easily in and out of the airways. Bronchitis narrows the airways, making it harder for air to flow in and out of the lungs. This causes symptoms such as shortness of breath, coughing up yellow or green mucus, and wheezing. Bronchitis can be acute or chronic. Acute means the condition comes on quickly and goes away in a short time, usually within 3 to 10 days. Chronic means a condition lasts a long time and often comes back.    What causes acute bronchitis?  Acute bronchitis almost always starts as a viral respiratory infection, such as a cold or the flu. Certain factors make it more likely for a cold or flu to turn into bronchitis. These include being very young, being elderly, having a heart or lung problem, or having a weak immune system. Cigarette smoking also makes bronchitis more likely.  When bronchitis develops, the airways become swollen. The airways may also become infected with bacteria. This is known as a secondary infection.  Diagnosing acute bronchitis  Your healthcare provider will examine you and ask about your symptoms and health history. You may also have a sputum culture to test the fluid in your lungs. Chest X-rays may be done to look for infection in the lungs.  Treating acute bronchitis  Bronchitis usually clears up as the cold or flu goes away. You can help feel better faster by doing the following:    Take medicine as directed. You may be  told to take ibuprofen or other over-the-counter medicines. These help relieve inflammation in your bronchial tubes. Your healthcare provider may prescribe an inhaler to help open up the bronchial tubes. Most of the time, acute bronchitis is caused by a viral infection. Antibiotics are usually not prescribed for viral infections.    Drink plenty of fluids, such as water, juice, or warm soup. Fluids loosen mucus so that you can cough it up. This helps you breathe more easily. Fluids also prevent dehydration.    Make sure you get plenty of rest.    Do not smoke. Do not allow anyone else to smoke in your home.  Recovery and follow-up  Follow up with your doctor as you are told. You will likely feel better in a week or two. But a dry cough can linger beyond that time. Let your doctor know if you still have symptoms (other than a dry cough) after 2 weeks, or if you re prone to getting bronchial infections. Take steps to protect yourself from future infections. These steps include stopping smoking and avoiding tobacco smoke, washing your hands often, and getting a yearly flu shot.  When to call your healthcare provider  Call the healthcare provider if you have any of the following:    Fever of 100.4 F (38.0 C) or higher, or as advised    Symptoms that get worse, or new symptoms    Trouble breathing    Symptoms that don t start to improve within a week, or within 3 days of taking antibiotics   Date Last Reviewed: 12/1/2016 2000-2017 [employer]. [address] [place] [address], [place] [address]. All rights reserved. This information is not intended as a substitute for professional medical care. Always follow your healthcare professional's instructions.                Follow-ups after your visit        Follow-up notes from your care team     Return if symptoms worsen or fail to improve.      Who to contact     If you have questions or need follow up information about today's clinic visit or your schedule please  "contact [facility] directly at [phone].  Normal or non-critical lab and imaging results will be communicated to you by MyChart, letter or phone within 4 business days after the clinic has received the results. If you do not hear from us within 7 days, please contact the clinic through MyChart or phone. If you have a critical or abnormal lab result, we will notify you by phone as soon as possible.  Submit refill requests through [employer] or call your pharmacy and they will forward the refill request to us. Please allow 3 business days for your refill to be completed.          Additional Information About Your Visit        [employer] Information     [employer] lets you send messages to your doctor, view your test results, renew your prescriptions, schedule appointments and more. To sign up, go to [email or web address] . Click on \"Log in\" on the left side of the screen, which will take you to the Welcome page. Then click on \"Sign up Now\" on the right side of the page.     You will be asked to enter the access code listed below, as well as some personal information. Please follow the directions to create your username and password.     Your access code is: [email or web address]  Expires: 8/3/2018 11:39 AM     Your access code will  in 90 days. If you need help or a new code, please call your [place] clinic or [phone].        Care EveryWhere ID     This is your Care EveryWhere ID. This could be used by other organizations to access your [place] medical records  [id number]        Your Vitals Were     Pulse Temperature Respirations Height BMI (Body Mass Index)       56 96  F (35.6  C) (Tympanic) 14 5' 9\" (1.753 m) 21.41 kg/m2        Blood Pressure from Last 3 Encounters:   18 130/68   18 94/62   17 92/62    Weight from Last 3 Encounters:   18 145 lb (65.8 kg)   18 147 lb (66.7 kg)   17 145 lb 8 oz (66 kg)              Today, you had the following     " No orders found for display         Today's Medication Changes          These changes are accurate as of 5/5/18 11:39 AM.  If you have any questions, ask your nurse or doctor.               Start taking these medicines.        Dose/Directions    albuterol 108 (90 Base) MCG/ACT Inhaler   Commonly known as:  PROAIR HFA/PROVENTIL HFA/VENTOLIN HFA   Used for:  Bronchitis   Started by:  Emma Hui DO        Dose:  2 puff   Inhale 2 puffs into the lungs every 6 hours as needed for shortness of breath / dyspnea or wheezing   Quantity:  1 Inhaler   Refills:  0       amoxicillin-clavulanate 500-125 MG per tablet   Commonly known as:  AUGMENTIN   Used for:  Bronchitis   Started by:  Emma Hui DO        Dose:  1 tablet   Take 1 tablet by mouth 3 times daily   Quantity:  30 tablet   Refills:  0       guaiFENesin-codeine 100-10 MG/5ML Soln solution   Commonly known as:  ROBITUSSIN AC   Used for:  Bronchitis   Started by:  Emma Hui DO        Dose:  1 tsp.   Take 5 mLs by mouth every 6 hours as needed for cough   Quantity:  120 mL   Refills:  0            Where to get your medicines      These medications were sent to [facility] [address] [place] [address] [address] [facility] [address] [facility] [address]     Phone:  [phone]     albuterol 108 (90 Base) MCG/ACT Inhaler    amoxicillin-clavulanate 500-125 MG per tablet         Some of these will need a paper prescription and others can be bought over the counter.  Ask your nurse if you have questions.     Bring a paper prescription for each of these medications     guaiFENesin-codeine 100-10 MG/5ML Soln solution                Primary Care Provider Office Phone # Fax #    Mishel Mosher -[phone]       [facility] [facility] [address]        Equal Access to Services     KIARA VIERA AH: paradise Jones, bernardo melissa  enriqueta sololeah zeng'aan ah. So [facility] [phone].    ATENCIÓN: Si yo delgado, tiene a beck disposición servicios gratuitos de asistencia lingüística. Chaparrita boudreaux [phone].    We comply with applicable federal civil rights laws and [place] laws. We do not discriminate on the basis of race, color, national origin, age, disability, sex, sexual orientation, or gender identity.            Thank you!     Thank you for choosing [facility]  for your care. Our goal is always to provide you with excellent care. Hearing back from our patients is one way we can continue to improve our services. Please take a few minutes to complete the written survey that you may receive in the mail after your visit with us. Thank you!             Your Updated Medication List - Protect others around you: Learn how to safely use, store and throw away your medicines at www.disposemymeds.org.          This list is accurate as of 5/5/18 11:39 AM.  Always use your most recent med list.                   Brand Name Dispense Instructions for use Diagnosis    albuterol 108 (90 Base) MCG/ACT Inhaler    PROAIR HFA/PROVENTIL HFA/VENTOLIN HFA    1 Inhaler    Inhale 2 puffs into the lungs every 6 hours as needed for shortness of breath / dyspnea or wheezing    Bronchitis       amoxicillin-clavulanate 500-125 MG per tablet    AUGMENTIN    30 tablet    Take 1 tablet by mouth 3 times daily    Bronchitis       dexamethasone 4 MG/ML injection    DECADRON     Apply 4 mg topically once. IM injection for emergency only    Autoimmune Dearing's disease (H)       fludrocortisone 0.1 MG tablet    FLORINEF    270 tablet    TAKE ONE TO THREE TABLETS BY MOUTH DAILY AS NEEDED    Autoimmune Dearing's disease (H)       guaiFENesin-codeine 100-10 MG/5ML Soln solution    ROBITUSSIN AC    120 mL    Take 5 mLs by mouth every 6 hours as needed for cough    Bronchitis       levothyroxine 175 MCG tablet    SYNTHROID/LEVOTHROID    90 tablet     Take 1 tablet (175 mcg) by mouth daily    Hypothyroidism due to Hashimoto's thyroiditis       valACYclovir 1000 mg tablet    VALTREX    30 tablet    Take 1 tablet (1,000 mg) by mouth 2 times daily    Recurrent herpes simplex

## 2018-06-18 DIAGNOSIS — B00.9 RECURRENT HERPES SIMPLEX: ICD-10-CM

## 2018-06-19 NOTE — TELEPHONE ENCOUNTER
"Requested Prescriptions   Pending Prescriptions Disp Refills     valACYclovir (VALTREX) 1000 mg tablet 30 tablet 0     Sig: Take 1 tablet (1,000 mg) by mouth 2 times daily    Antivirals for Herpes Protocol Failed    6/18/2018  1:13 PM       Failed - Normal serum creatinine on file in past 12 months    Recent Labs   Lab Test  12/09/16   1002   CR  0.98            Passed - Patient is age 12 or older       Passed - Recent (12 mo) or future (30 days) visit within the authorizing provider's specialty    Patient had office visit in the last 12 months or has a visit in the next 30 days with authorizing provider or within the authorizing provider's specialty.  See \"Patient Info\" tab in inbasket, or \"Choose Columns\" in Meds & Orders section of the refill encounter.          Valacyclovir 1000mg      Last Written Prescription Date:  3/9/2018  Last Fill Quantity: 30,   # refills: 0  Last Office Visit: 5/5/2018  Future Office visit:  none         "

## 2018-06-21 RX ORDER — VALACYCLOVIR HYDROCHLORIDE 1 G/1
1000 TABLET, FILM COATED ORAL 2 TIMES DAILY
Qty: 30 TABLET | Refills: 1 | Status: SHIPPED | OUTPATIENT
Start: 2018-06-21 | End: 2018-08-21

## 2018-08-21 ENCOUNTER — OFFICE VISIT (OUTPATIENT)
Dept: FAMILY MEDICINE | Facility: CLINIC | Age: 48
End: 2018-08-21
Payer: COMMERCIAL

## 2018-08-21 VITALS
SYSTOLIC BLOOD PRESSURE: 110 MMHG | TEMPERATURE: 97.6 F | WEIGHT: 150 LBS | BODY MASS INDEX: 22.22 KG/M2 | OXYGEN SATURATION: 99 % | DIASTOLIC BLOOD PRESSURE: 64 MMHG | HEART RATE: 56 BPM | HEIGHT: 69 IN

## 2018-08-21 DIAGNOSIS — B00.9 RECURRENT HERPES SIMPLEX: ICD-10-CM

## 2018-08-21 DIAGNOSIS — Z11.4 SCREENING FOR HIV (HUMAN IMMUNODEFICIENCY VIRUS): ICD-10-CM

## 2018-08-21 DIAGNOSIS — E27.1 AUTOIMMUNE ADDISON'S DISEASE (H): ICD-10-CM

## 2018-08-21 DIAGNOSIS — Z00.00 ROUTINE GENERAL MEDICAL EXAMINATION AT A HEALTH CARE FACILITY: Primary | ICD-10-CM

## 2018-08-21 DIAGNOSIS — Z12.11 SCREEN FOR COLON CANCER: ICD-10-CM

## 2018-08-21 DIAGNOSIS — E06.3 HYPOTHYROIDISM DUE TO HASHIMOTO'S THYROIDITIS: ICD-10-CM

## 2018-08-21 PROCEDURE — 87389 HIV-1 AG W/HIV-1&-2 AB AG IA: CPT | Performed by: FAMILY MEDICINE

## 2018-08-21 PROCEDURE — 90471 IMMUNIZATION ADMIN: CPT | Performed by: FAMILY MEDICINE

## 2018-08-21 PROCEDURE — 84443 ASSAY THYROID STIM HORMONE: CPT | Performed by: FAMILY MEDICINE

## 2018-08-21 PROCEDURE — 99396 PREV VISIT EST AGE 40-64: CPT | Mod: 25 | Performed by: FAMILY MEDICINE

## 2018-08-21 PROCEDURE — 80053 COMPREHEN METABOLIC PANEL: CPT | Performed by: FAMILY MEDICINE

## 2018-08-21 PROCEDURE — 36415 COLL VENOUS BLD VENIPUNCTURE: CPT | Performed by: FAMILY MEDICINE

## 2018-08-21 PROCEDURE — 90750 HZV VACC RECOMBINANT IM: CPT | Performed by: FAMILY MEDICINE

## 2018-08-21 PROCEDURE — 90472 IMMUNIZATION ADMIN EACH ADD: CPT | Performed by: FAMILY MEDICINE

## 2018-08-21 PROCEDURE — 90715 TDAP VACCINE 7 YRS/> IM: CPT | Performed by: FAMILY MEDICINE

## 2018-08-21 RX ORDER — LEVOTHYROXINE SODIUM 175 UG/1
175 TABLET ORAL DAILY
Qty: 90 TABLET | Refills: 3 | Status: SHIPPED | OUTPATIENT
Start: 2018-08-21 | End: 2019-09-12

## 2018-08-21 RX ORDER — FLUDROCORTISONE ACETATE 0.1 MG/1
TABLET ORAL
Qty: 270 TABLET | Refills: 2 | Status: SHIPPED | OUTPATIENT
Start: 2018-08-21 | End: 2019-12-13

## 2018-08-21 RX ORDER — DEXAMETHASONE SODIUM PHOSPHATE 4 MG/ML
4 INJECTION, SOLUTION INTRA-ARTICULAR; INTRALESIONAL; INTRAMUSCULAR; INTRAVENOUS; SOFT TISSUE ONCE
Qty: 1 ML | Refills: 0 | Status: SHIPPED | OUTPATIENT
Start: 2018-08-21 | End: 2020-02-27

## 2018-08-21 RX ORDER — VALACYCLOVIR HYDROCHLORIDE 1 G/1
1000 TABLET, FILM COATED ORAL 2 TIMES DAILY
Qty: 30 TABLET | Refills: 1 | Status: SHIPPED | OUTPATIENT
Start: 2018-08-21 | End: 2019-12-13

## 2018-08-21 NOTE — MR AVS SNAPSHOT
After Visit Summary   8/21/2018    Triny Florian    MRN: [id number]           Patient Information     Date Of Birth          1970        Visit Information        Provider Department      8/21/2018 1:00 PM Mishel Mosher MD [facility]        Today's Diagnoses     Routine general medical examination at a health care facility    -  1    Autoimmune Bernard's disease (H)        Screen for colon cancer        Screening for HIV (human immunodeficiency virus)        Autoimmune Bryant's disease (H)        Hypothyroidism due to Hashimoto's thyroiditis        Recurrent herpes simplex          Care Instructions      Preventive Health Recommendations  Female Ages 50 - 64    Yearly exam: See your health care provider every year in order to  o Review health changes.   o Discuss preventive care.    o Review your medicines if your doctor has prescribed any.      Get a Pap test every three years (unless you have an abnormal result and your provider advises testing more often).    If you get Pap tests with HPV test, you only need to test every 5 years, unless you have an abnormal result.     You do not need a Pap test if your uterus was removed (hysterectomy) and you have not had cancer.    You should be tested each year for STDs (sexually transmitted diseases) if you're at risk.     Have a mammogram every 1 to 2 years.    Have a colonoscopy at age 50, or have a yearly FIT test (stool test). These exams screen for colon cancer.      Have a cholesterol test every 5 years, or more often if advised.    Have a diabetes test (fasting glucose) every three years. If you are at risk for diabetes, you should have this test more often.     If you are at risk for osteoporosis (brittle bone disease), think about having a bone density scan (DEXA).    Shots: Get a flu shot each year. Get a tetanus shot every 10 years.    Nutrition:     Eat at least 5 servings of fruits and vegetables  each day.    Eat whole-grain bread, whole-wheat pasta and brown rice instead of white grains and rice.    Get adequate Calcium and Vitamin D.     Lifestyle    Exercise at least 150 minutes a week (30 minutes a day, 5 days a week). This will help you control your weight and prevent disease.    Limit alcohol to one drink per day.    No smoking.     Wear sunscreen to prevent skin cancer.     See your dentist every six months for an exam and cleaning.    See your eye doctor every 1 to 2 years.            Follow-ups after your visit        Additional Services     GASTROENTEROLOGY ADULT REF PROCEDURE ONLY Other; [place] [facility] [phone]                 Who to contact     If you have questions or need follow up information about today's clinic visit or your schedule please contact [facility] directly at [phone].  Normal or non-critical lab and imaging results will be communicated to you by MyChart, letter or phone within 4 business days after the clinic has received the results. If you do not hear from us within 7 days, please contact the clinic through [employer] or phone. If you have a critical or abnormal lab result, we will notify you by phone as soon as possible.  Submit refill requests through [employer] or call your pharmacy and they will forward the refill request to us. Please allow 3 business days for your refill to be completed.          Additional Information About Your Visit        [employer] Information     [employer] gives you secure access to your electronic health record. If you see a primary care provider, you can also send messages to your care team and make appointments. If you have questions, please call your primary care clinic.  If you do not have a primary care provider, please call [phone] and they will assist you.        Care EveryWhere ID     This is your Care EveryWhere ID. This could be used by other organizations to access your [facility]  "records  [id number]        Your Vitals Were     Pulse Temperature Height Pulse Oximetry BMI (Body Mass Index)       56 97.6  F (36.4  C) (Tympanic) 5' 9\" (1.753 m) 99% 22.15 kg/m2        Blood Pressure from Last 3 Encounters:   08/21/18 110/64   05/05/18 130/68   01/26/18 94/62    Weight from Last 3 Encounters:   08/21/18 150 lb (68 kg)   05/05/18 145 lb (65.8 kg)   01/26/18 147 lb (66.7 kg)              We Performed the Following     Comprehensive metabolic panel (BMP + Alb, Alk Phos, ALT, AST, Total. Bili, TP)     GASTROENTEROLOGY ADULT REF PROCEDURE ONLY Other; [place] [facility] [phone]     HIV Screening     TDAP, IM (10 - 64 YRS) - Adacel     TSH with free T4 reflex     ZOSTER VACCINE RECOMBINANT ADJUVANTED IM NJX          Today's Medication Changes          These changes are accurate as of 8/21/18  1:32 PM.  If you have any questions, ask your nurse or doctor.               Stop taking these medicines if you haven't already. Please contact your care team if you have questions.     albuterol 108 (90 Base) MCG/ACT inhaler   Commonly known as:  PROAIR HFA/PROVENTIL HFA/VENTOLIN HFA   Stopped by:  Mishel Mosher MD           amoxicillin-clavulanate 500-125 MG per tablet   Commonly known as:  AUGMENTIN   Stopped by:  Mishel Mosher MD           guaiFENesin-codeine 100-10 MG/5ML Soln solution   Commonly known as:  ROBITUSSIN AC   Stopped by:  Misehl Mosher MD                Where to get your medicines      These medications were sent to [facility] - [place], [place] - [address]  [address] [facility], [facility] [address]    Hours:  Mon-Fri 8:30am-5:00pm Toll Free [phone] Phone:  [phone]     dexamethasone 4 MG/ML injection    fludrocortisone 0.1 MG tablet    levothyroxine 175 MCG tablet    valACYclovir 1000 mg tablet                Primary Care Provider Office Phone # Fax #    Mishel Mosher [phone]       [facility]" [place] [address]        Equal Access to Services     [facility] MAXIMILIANO : Hadii roberto mercado trinapancho Peaceali, wakareenda luqadaha, qaybta kaalmabernardo hernandez. So [facility] [phone].    ATENCIÓN: Si habla español, tiene a beck disposición servicios gratuitos de asistencia lingüística. Melbaame al [phone].    We comply with applicable federal civil rights laws and [place] laws. We do not discriminate on the basis of race, color, national origin, age, disability, sex, sexual orientation, or gender identity.            Thank you!     Thank you for choosing [facility]  for your care. Our goal is always to provide you with excellent care. Hearing back from our patients is one way we can continue to improve our services. Please take a few minutes to complete the written survey that you may receive in the mail after your visit with us. Thank you!             Your Updated Medication List - Protect others around you: Learn how to safely use, store and throw away your medicines at www.disposemymeds.org.          This list is accurate as of 8/21/18  1:32 PM.  Always use your most recent med list.                   Brand Name Dispense Instructions for use Diagnosis    dexamethasone 4 MG/ML injection    DECADRON    1 mL    Apply 1 mL (4 mg) topically once for 1 dose IM injection for emergency only    Autoimmune Happy's disease (H)       fludrocortisone 0.1 MG tablet    FLORINEF    270 tablet    TAKE ONE TO THREE TABLETS BY MOUTH DAILY AS NEEDED    Autoimmune Happy's disease (H)       levothyroxine 175 MCG tablet    SYNTHROID/LEVOTHROID    90 tablet    Take 1 tablet (175 mcg) by mouth daily    Hypothyroidism due to Hashimoto's thyroiditis       valACYclovir 1000 mg tablet    VALTREX    30 tablet    Take 1 tablet (1,000 mg) by mouth 2 times daily    Recurrent herpes simplex

## 2018-08-21 NOTE — PROGRESS NOTES
SUBJECTIVE:   CC: Triny Florian is an 48 year old woman who presents for preventive health visit.     Physical   Annual:     Getting at least 3 servings of Calcium per day:  Yes    Bi-annual eye exam:  Yes    Dental care twice a year:  Yes    Sleep apnea or symptoms of sleep apnea:  None    Diet:  Regular (no restrictions) and Diabetic    Frequency of exercise:  4-5 days/week    Duration of exercise:  45-60 minutes    Taking medications regularly:  Yes    Medication side effects:  Not applicable    Additional concerns today:  No        Today's PHQ-2 Score:   PHQ-2 ( 1999 Pfizer) 8/21/2018   Q1: Little interest or pleasure in doing things 0   Q2: Feeling down, depressed or hopeless 0   PHQ-2 Score 0   Q1: Little interest or pleasure in doing things Not at all   Q2: Feeling down, depressed or hopeless Not at all   PHQ-2 Score 0       Abuse: Current or Past(Physical, Sexual or Emotional)- No  Do you feel safe in your environment - Yes    Social History   Substance Use Topics     Smoking status: Never Smoker     Smokeless tobacco: Never Used     Alcohol use 0.0 oz/week     0 Standard drinks or equivalent per week      Comment: socially     Alcohol Use 8/21/2018   If you drink alcohol do you typically have greater than 3 drinks per day OR greater than 7 drinks per week? No   No flowsheet data found.    Reviewed orders with patient.  Reviewed health maintenance and updated orders accordingly - Yes    Patient over age 50, mutual decision to screen reflected in health maintenance.    Pertinent mammograms are reviewed under the imaging tab.  History of abnormal Pap smear: NO - age 30-65 PAP every 5 years with negative HPV co-testing recommended  PAP / HPV Latest Ref Rng & Units 11/29/2017 1/14/2015 5/23/2012   PAP - NIL NIL NIL   HPV 16 DNA NEG:Negative Negative - -   HPV 18 DNA NEG:Negative Negative - -   OTHER HR HPV NEG:Negative Negative - -     Reviewed and updated as needed this visit by clinical staff  Tobacco   "Allergies  Meds  Problems  Med Hx  Surg Hx  Fam Hx  Soc Hx          Reviewed and updated as needed this visit by Provider  Allergies  Meds  Problems            Review of Systems  CONSTITUTIONAL: NEGATIVE for fever, chills, change in weight  INTEGUMENTARU/SKIN: NEGATIVE for worrisome rashes, moles or lesions  EYES: NEGATIVE for vision changes or irritation  ENT: NEGATIVE for ear, mouth and throat problems  RESP: NEGATIVE for significant cough or SOB  BREAST: NEGATIVE for masses, tenderness or discharge  CV: NEGATIVE for chest pain, palpitations or peripheral edema  GI: NEGATIVE for nausea, abdominal pain, heartburn, or change in bowel habits  : NEGATIVE for unusual urinary or vaginal symptoms. Periods are regular.  MUSCULOSKELETAL: NEGATIVE for significant arthralgias or myalgia  NEURO: NEGATIVE for weakness, dizziness or paresthesias  PSYCHIATRIC: NEGATIVE for changes in mood or affect     OBJECTIVE:   /64 (Cuff Size: Adult Regular)  Pulse 56  Temp 97.6  F (36.4  C) (Tympanic)  Ht 5' 9\" (1.753 m)  Wt 150 lb (68 kg)  SpO2 99%  BMI 22.15 kg/m2  Physical Exam  GENERAL: healthy, alert and no distress  EYES: Eyes grossly normal to inspection, PERRL and conjunctivae and sclerae normal  HENT: ear canals and TM's normal, nose and mouth without ulcers or lesions  NECK: no adenopathy, no asymmetry, masses, or scars and thyroid normal to palpation  RESP: lungs clear to auscultation - no rales, rhonchi or wheezes  BREAST: normal without masses, tenderness or nipple discharge and no palpable axillary masses or adenopathy  CV: regular rate and rhythm, normal S1 S2, no S3 or S4, no murmur, click or rub, no peripheral edema and peripheral pulses strong  ABDOMEN: soft, nontender, no hepatosplenomegaly, no masses and bowel sounds normal  MS: no gross musculoskeletal defects noted, no edema  SKIN: no suspicious lesions or rashes  NEURO: Normal strength and tone, mentation intact and speech normal  PSYCH: " "mentation appears normal, affect normal/bright      ASSESSMENT/PLAN:       ICD-10-CM    1. Routine general medical examination at a health care facility Z00.00 HIV Screening     TSH with free T4 reflex     Comprehensive metabolic panel (BMP + Alb, Alk Phos, ALT, AST, Total. Bili, TP)     TDAP, IM (10 - 64 YRS) - Adacel     ZOSTER VACCINE RECOMBINANT ADJUVANTED IM NJX   2. 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 Screen for colon cancer Z12.11 GASTROENTEROLOGY ADULT REF PROCEDURE ONLY Other; [facility] [phone]   4. Screening for HIV (human immunodeficiency virus) Z11.4    5. Hypothyroidism due to Hashimoto's thyroiditis E03.8 levothyroxine (SYNTHROID/LEVOTHROID) 175 MCG tablet    E06.3    6. Recurrent herpes simplex B00.9 valACYclovir (VALTREX) 1000 mg tablet       COUNSELING:  Reviewed preventive health counseling, as reflected in patient instructions       Regular exercise       Healthy diet/nutrition       Immunizations    Vaccinated for: TDAP and Zoster             HIV screeninx in teen years, 1x in adult years, and at intervals if high risk    BP Readings from Last 1 Encounters:   18 110/64     Estimated body mass index is 22.15 kg/(m^2) as calculated from the following:    Height as of this encounter: 5' 9\" (1.753 m).    Weight as of this encounter: 150 lb (68 kg).           reports that she has never smoked. She has never used smokeless tobacco.      Counseling Resources:  ATP IV Guidelines  Pooled Cohorts Equation Calculator  Breast Cancer Risk Calculator  FRAX Risk Assessment  ICSI Preventive Guidelines  Dietary Guidelines for Americans,   [employer]'s MyPlate  ASA Prophylaxis  Lung CA Screening    Mishel Mosher MD  [facility]  Answers for HPI/ROS submitted by the patient on 2018   PHQ-2 Score: 0    "

## 2018-08-22 LAB
ALBUMIN SERPL-MCNC: 3.8 G/DL (ref 3.4–5)
ALP SERPL-CCNC: 54 U/L (ref 40–150)
ALT SERPL W P-5'-P-CCNC: 16 U/L (ref 0–50)
ANION GAP SERPL CALCULATED.3IONS-SCNC: 4 MMOL/L (ref 3–14)
AST SERPL W P-5'-P-CCNC: 16 U/L (ref 0–45)
BILIRUB SERPL-MCNC: 0.5 MG/DL (ref 0.2–1.3)
BUN SERPL-MCNC: 16 MG/DL (ref 7–30)
CALCIUM SERPL-MCNC: 8.6 MG/DL (ref 8.5–10.1)
CHLORIDE SERPL-SCNC: 104 MMOL/L (ref 94–109)
CO2 SERPL-SCNC: 30 MMOL/L (ref 20–32)
CREAT SERPL-MCNC: 0.77 MG/DL (ref 0.52–1.04)
GFR SERPL CREATININE-BSD FRML MDRD: 79 ML/MIN/1.7M2
GLUCOSE SERPL-MCNC: 89 MG/DL (ref 70–99)
HIV 1+2 AB+HIV1 P24 AG SERPL QL IA: NONREACTIVE
POTASSIUM SERPL-SCNC: 3.9 MMOL/L (ref 3.4–5.3)
PROT SERPL-MCNC: 6.8 G/DL (ref 6.8–8.8)
SODIUM SERPL-SCNC: 138 MMOL/L (ref 133–144)
TSH SERPL DL<=0.005 MIU/L-ACNC: 0.92 MU/L (ref 0.4–4)

## 2018-08-23 NOTE — PROGRESS NOTES
Good news, Triny.  Your results are normal.  Let me know if you have any questions.  Dr. Mishel Mosher MD  [facility]  [phone]

## 2018-09-12 ENCOUNTER — TRANSFERRED RECORDS (OUTPATIENT)
Dept: HEALTH INFORMATION MANAGEMENT | Facility: CLINIC | Age: 48
End: 2018-09-12

## 2018-12-14 ENCOUNTER — HOSPITAL ENCOUNTER (OUTPATIENT)
Dept: MAMMOGRAPHY | Facility: CLINIC | Age: 48
Discharge: HOME | End: 2018-12-14
Attending: FAMILY MEDICINE | Admitting: FAMILY MEDICINE
Payer: COMMERCIAL

## 2018-12-14 DIAGNOSIS — Z12.31 VISIT FOR SCREENING MAMMOGRAM: ICD-10-CM

## 2018-12-14 PROCEDURE — 77063 BREAST TOMOSYNTHESIS BI: CPT

## 2018-12-14 NOTE — RESULT ENCOUNTER NOTE
Christie Agosto:  Good news!  Your mammogram looks normal and reassuring.  The breast center will send along a more detailed report.  Let us know if you have any questions about this.  Best,  Dr. Mishel Mosher MD  [facility]  [phone]

## 2019-02-26 ENCOUNTER — ALLIED HEALTH/NURSE VISIT (OUTPATIENT)
Dept: NURSING | Facility: CLINIC | Age: 49
End: 2019-02-26
Payer: COMMERCIAL

## 2019-02-26 DIAGNOSIS — Z23 ENCOUNTER FOR IMMUNIZATION: Primary | ICD-10-CM

## 2019-02-26 PROCEDURE — 90750 HZV VACC RECOMBINANT IM: CPT

## 2019-02-26 PROCEDURE — 90471 IMMUNIZATION ADMIN: CPT

## 2019-02-26 PROCEDURE — 99207 ZZC NO CHARGE NURSE ONLY: CPT

## 2019-09-12 DIAGNOSIS — E06.3 HYPOTHYROIDISM DUE TO HASHIMOTO'S THYROIDITIS: ICD-10-CM

## 2019-09-12 RX ORDER — LEVOTHYROXINE SODIUM 175 UG/1
175 TABLET ORAL DAILY
Qty: 90 TABLET | Refills: 3 | Status: SHIPPED | OUTPATIENT
Start: 2019-09-12 | End: 2020-09-12

## 2019-09-12 NOTE — TELEPHONE ENCOUNTER
"Requested Prescriptions   Pending Prescriptions Disp Refills     levothyroxine (SYNTHROID/LEVOTHROID) 175 MCG tablet  Last Written Prescription Date:  8/21/2018  Last Fill Quantity: 90 tab,  # refills: 3   Last Office Visit: 8/21/2018 Nomi  Future Office Visit:      90 tablet 3     Sig: Take 1 tablet (175 mcg) by mouth daily       Thyroid Protocol Failed - 9/12/2019  9:20 AM        Failed - Recent (12 mo) or future (30 days) visit within the authorizing provider's specialty     Patient had office visit in the last 12 months or has a visit in the next 30 days with authorizing provider or within the authorizing provider's specialty.  See \"Patient Info\" tab in inbasket, or \"Choose Columns\" in Meds & Orders section of the refill encounter.              Failed - Normal TSH on file in past 12 months     Recent Labs   Lab Test 08/21/18  1357   TSH 0.92              Passed - Patient is 12 years or older        Passed - Medication is active on med list        Passed - No active pregnancy on record     If patient is pregnant or has had a positive pregnancy test, please check TSH.          Passed - No positive pregnancy test in past 12 months     If patient is pregnant or has had a positive pregnancy test, please check TSH.          "

## 2019-09-12 NOTE — TELEPHONE ENCOUNTER
Left voice message informing pt she is due for yearly exam.     Routing refill request to provider for review/approval because:  Labs not current:  TSH  Patient needs to be seen because it has been more than 1 year since last office visit.

## 2019-12-10 ASSESSMENT — ENCOUNTER SYMPTOMS
SORE THROAT: 0
DYSURIA: 0
DIARRHEA: 0
HEADACHES: 0
HEMATOCHEZIA: 0
JOINT SWELLING: 0
EYE PAIN: 0
CHILLS: 0
NERVOUS/ANXIOUS: 0
ABDOMINAL PAIN: 0
SHORTNESS OF BREATH: 0
MYALGIAS: 0
BREAST MASS: 0
ARTHRALGIAS: 0
FREQUENCY: 0
COUGH: 0
PALPITATIONS: 0
WEAKNESS: 0
HEMATURIA: 0
HEARTBURN: 0
NAUSEA: 0
PARESTHESIAS: 0
DIZZINESS: 0
FEVER: 0
CONSTIPATION: 0

## 2019-12-13 ENCOUNTER — OFFICE VISIT (OUTPATIENT)
Dept: FAMILY MEDICINE | Facility: CLINIC | Age: 49
End: 2019-12-13
Payer: COMMERCIAL

## 2019-12-13 VITALS
TEMPERATURE: 97.8 F | RESPIRATION RATE: 16 BRPM | SYSTOLIC BLOOD PRESSURE: 105 MMHG | OXYGEN SATURATION: 98 % | HEART RATE: 67 BPM | HEIGHT: 69 IN | BODY MASS INDEX: 22.66 KG/M2 | WEIGHT: 153 LBS | DIASTOLIC BLOOD PRESSURE: 61 MMHG

## 2019-12-13 DIAGNOSIS — B00.9 RECURRENT HERPES SIMPLEX: ICD-10-CM

## 2019-12-13 DIAGNOSIS — E27.1 ADDISON'S DISEASE (H): ICD-10-CM

## 2019-12-13 DIAGNOSIS — Z00.00 ROUTINE GENERAL MEDICAL EXAMINATION AT A HEALTH CARE FACILITY: Primary | ICD-10-CM

## 2019-12-13 DIAGNOSIS — E27.1 AUTOIMMUNE ADDISON'S DISEASE (H): ICD-10-CM

## 2019-12-13 LAB
CHOLEST SERPL-MCNC: 210 MG/DL
HDLC SERPL-MCNC: 80 MG/DL
LDLC SERPL CALC-MCNC: 113 MG/DL
NONHDLC SERPL-MCNC: 130 MG/DL
TRIGL SERPL-MCNC: 83 MG/DL
TSH SERPL DL<=0.005 MIU/L-ACNC: 1.3 MU/L (ref 0.4–4)

## 2019-12-13 PROCEDURE — 90471 IMMUNIZATION ADMIN: CPT | Performed by: FAMILY MEDICINE

## 2019-12-13 PROCEDURE — 90732 PPSV23 VACC 2 YRS+ SUBQ/IM: CPT | Performed by: FAMILY MEDICINE

## 2019-12-13 PROCEDURE — 84443 ASSAY THYROID STIM HORMONE: CPT | Performed by: FAMILY MEDICINE

## 2019-12-13 PROCEDURE — 36415 COLL VENOUS BLD VENIPUNCTURE: CPT | Performed by: FAMILY MEDICINE

## 2019-12-13 PROCEDURE — 80061 LIPID PANEL: CPT | Performed by: FAMILY MEDICINE

## 2019-12-13 PROCEDURE — 99396 PREV VISIT EST AGE 40-64: CPT | Mod: 25 | Performed by: FAMILY MEDICINE

## 2019-12-13 RX ORDER — FLUDROCORTISONE ACETATE 0.1 MG/1
TABLET ORAL
Qty: 270 TABLET | Refills: 3 | Status: SHIPPED | OUTPATIENT
Start: 2019-12-13 | End: 2021-01-08

## 2019-12-13 RX ORDER — VALACYCLOVIR HYDROCHLORIDE 1 G/1
1000 TABLET, FILM COATED ORAL 2 TIMES DAILY
Qty: 30 TABLET | Refills: 1 | Status: SHIPPED | OUTPATIENT
Start: 2019-12-13 | End: 2020-07-09

## 2019-12-13 ASSESSMENT — ENCOUNTER SYMPTOMS
SHORTNESS OF BREATH: 0
DIZZINESS: 0
BREAST MASS: 0
CHILLS: 0
ARTHRALGIAS: 0
FEVER: 0
PARESTHESIAS: 0
COUGH: 0
FREQUENCY: 0
HEMATURIA: 0
MYALGIAS: 0
WEAKNESS: 0
NAUSEA: 0
CONSTIPATION: 0
ABDOMINAL PAIN: 0
HEARTBURN: 0
HEADACHES: 0
DYSURIA: 0
JOINT SWELLING: 0
NERVOUS/ANXIOUS: 0
DIARRHEA: 0
PALPITATIONS: 0
HEMATOCHEZIA: 0
EYE PAIN: 0
SORE THROAT: 0

## 2019-12-13 ASSESSMENT — MIFFLIN-ST. JEOR: SCORE: 1373.38

## 2019-12-13 NOTE — PROGRESS NOTES
SUBJECTIVE:   CC: Triny Florian is an 49 year old woman who presents for preventive health visit.     Healthy Habits:     Getting at least 3 servings of Calcium per day:  Yes    Bi-annual eye exam:  Yes    Dental care twice a year:  Yes    Sleep apnea or symptoms of sleep apnea:  None    Diet:  Regular (no restrictions)    Frequency of exercise:  4-5 days/week    Duration of exercise:  45-60 minutes    Taking medications regularly:  Yes    Medication side effects:  None    PHQ-2 Total Score: 0    Additional concerns today:  No        Today's PHQ-2 Score:   PHQ-2 ( 1999 Pfizer) 12/10/2019   Q1: Little interest or pleasure in doing things 0   Q2: Feeling down, depressed or hopeless 0   PHQ-2 Score 0   Q1: Little interest or pleasure in doing things Not at all   Q2: Feeling down, depressed or hopeless Not at all   PHQ-2 Score 0       Abuse: Current or Past(Physical, Sexual or Emotional)- No  Do you feel safe in your environment? Yes        Social History     Tobacco Use     Smoking status: Never Smoker     Smokeless tobacco: Never Used   Substance Use Topics     Alcohol use: Yes     Alcohol/week: 0.0 standard drinks     Comment: socially     If you drink alcohol do you typically have >3 drinks per day or >7 drinks per week? No    Alcohol Use 12/13/2019   Prescreen: >3 drinks/day or >7 drinks/week? -   Prescreen: >3 drinks/day or >7 drinks/week? No       Reviewed orders with patient.  Reviewed health maintenance and updated orders accordingly - Yes      Mammogram Screening: Patient over age 50, mutual decision to screen reflected in health maintenance.    Pertinent mammograms are reviewed under the imaging tab.  History of abnormal Pap smear: NO - age 30-65 PAP every 5 years with negative HPV co-testing recommended  PAP / HPV Latest Ref Rng & Units 11/29/2017 1/14/2015 5/23/2012   PAP - NIL NIL NIL   HPV 16 DNA NEG:Negative Negative - -   HPV 18 DNA NEG:Negative Negative - -   OTHER HR HPV NEG:Negative Negative -  "-     Reviewed and updated as needed this visit by clinical staff  Tobacco  Allergies  Meds  Problems  Med Hx  Surg Hx  Fam Hx  Soc Hx          Reviewed and updated as needed this visit by Provider  Tobacco  Allergies  Meds  Problems  Med Hx  Surg Hx  Fam Hx            Review of Systems   Constitutional: Negative for chills and fever.   HENT: Negative for congestion, ear pain, hearing loss and sore throat.    Eyes: Positive for visual disturbance. Negative for pain.   Respiratory: Negative for cough and shortness of breath.    Cardiovascular: Negative for chest pain, palpitations and peripheral edema.   Gastrointestinal: Negative for abdominal pain, constipation, diarrhea, heartburn, hematochezia and nausea.   Breasts:  Negative for tenderness, breast mass and discharge.   Genitourinary: Negative for dysuria, frequency, genital sores, hematuria, pelvic pain, urgency, vaginal bleeding and vaginal discharge.   Musculoskeletal: Negative for arthralgias, joint swelling and myalgias.   Skin: Negative for rash.   Neurological: Negative for dizziness, weakness, headaches and paresthesias.   Psychiatric/Behavioral: Negative for mood changes. The patient is not nervous/anxious.           OBJECTIVE:   /61   Pulse 67   Temp 97.8  F (36.6  C) (Oral)   Resp 16   Ht 1.753 m (5' 9\")   Wt 69.4 kg (153 lb)   SpO2 98%   BMI 22.59 kg/m    Physical Exam  GENERAL: healthy, alert and no distress  EYES: Eyes grossly normal to inspection, PERRL and conjunctivae and sclerae normal  HENT: ear canals and TM's normal, nose and mouth without ulcers or lesions  NECK: no adenopathy, no asymmetry, masses, or scars and thyroid normal to palpation  RESP: lungs clear to auscultation - no rales, rhonchi or wheezes  BREAST: normal without masses, tenderness or nipple discharge and no palpable axillary masses or adenopathy  CV: regular rate and rhythm, normal S1 S2, no S3 or S4, no murmur, click or rub, no peripheral edema and " "peripheral pulses strong  ABDOMEN: soft, nontender, no hepatosplenomegaly, no masses and bowel sounds normal  MS: no gross musculoskeletal defects noted, no edema  SKIN: no suspicious lesions or rashes  NEURO: Normal strength and tone, mentation intact and speech normal  PSYCH: mentation appears normal, affect normal/bright    Diagnostic Test Results:  Labs reviewed in Epic    ASSESSMENT/PLAN:   Triny was seen today for physical.    Diagnoses and all orders for this visit:    Routine general medical examination at a health care facility  -     Lipid panel reflex to direct LDL Non-fasting    Recurrent herpes simplex  -     valACYclovir (VALTREX) 1000 mg tablet; Take 1 tablet (1,000 mg) by mouth 2 times daily    Autoimmune Teller's disease (H)  -     fludrocortisone (FLORINEF) 0.1 MG tablet; TAKE ONE TO THREE TABLETS BY MOUTH DAILY AS NEEDED    Teller's disease (H)  -     TSH WITH FREE T4 REFLEX    Other orders  -     PPSV23, IM/SUBQ (2+ YRS) - [id number]        COUNSELING:  Reviewed preventive health counseling, as reflected in patient instructions    Estimated body mass index is 22.59 kg/m  as calculated from the following:    Height as of this encounter: 1.753 m (5' 9\").    Weight as of this encounter: 69.4 kg (153 lb).         reports that she has never smoked. She has never used smokeless tobacco.      Counseling Resources:  ATP IV Guidelines  Pooled Cohorts Equation Calculator  Breast Cancer Risk Calculator  FRAX Risk Assessment  ICSI Preventive Guidelines  Dietary Guidelines for Americans, 2010  USDA's MyPlate  ASA Prophylaxis  Lung CA Screening    Mishel Mosher MD  [facility]  "

## 2019-12-17 NOTE — RESULT ENCOUNTER NOTE
Malcolm Agosto:  For the most part this looks good - although your cholesterol has jumped up from our last check, your good HDL cholesterol is still quite high and healthy.  Let's plan to check at your physical again next year.  Let me know if you have any further questions.  Best,  Dr. Mishel Mosher MD/[facility]

## 2019-12-20 ENCOUNTER — HOSPITAL ENCOUNTER (OUTPATIENT)
Dept: MAMMOGRAPHY | Facility: CLINIC | Age: 49
Discharge: HOME | End: 2019-12-20
Attending: FAMILY MEDICINE | Admitting: FAMILY MEDICINE
Payer: COMMERCIAL

## 2019-12-20 DIAGNOSIS — Z12.31 VISIT FOR SCREENING MAMMOGRAM: ICD-10-CM

## 2019-12-20 PROCEDURE — 77063 BREAST TOMOSYNTHESIS BI: CPT

## 2019-12-21 NOTE — RESULT ENCOUNTER NOTE
Christie Agosto:  Good news!  Your mammogram looks normal and reassuring.  The breast center will send along a more detailed report.  Let us know if you have any questions about this.  Best,  Dr. Mishel Mosher MD  [facility]  [phone]

## 2020-02-27 ENCOUNTER — ANCILLARY PROCEDURE (OUTPATIENT)
Dept: GENERAL RADIOLOGY | Facility: CLINIC | Age: 50
End: 2020-02-27
Attending: FAMILY MEDICINE
Payer: COMMERCIAL

## 2020-02-27 ENCOUNTER — OFFICE VISIT (OUTPATIENT)
Dept: FAMILY MEDICINE | Facility: CLINIC | Age: 50
End: 2020-02-27
Payer: COMMERCIAL

## 2020-02-27 VITALS
RESPIRATION RATE: 18 BRPM | HEART RATE: 68 BPM | HEIGHT: 69 IN | WEIGHT: 151 LBS | OXYGEN SATURATION: 97 % | BODY MASS INDEX: 22.36 KG/M2 | DIASTOLIC BLOOD PRESSURE: 80 MMHG | SYSTOLIC BLOOD PRESSURE: 110 MMHG | TEMPERATURE: 97.8 F

## 2020-02-27 DIAGNOSIS — J98.01 ACUTE BRONCHOSPASM: ICD-10-CM

## 2020-02-27 DIAGNOSIS — H65.02 NON-RECURRENT ACUTE SEROUS OTITIS MEDIA OF LEFT EAR: ICD-10-CM

## 2020-02-27 DIAGNOSIS — J20.9 ACUTE BRONCHITIS, UNSPECIFIED ORGANISM: ICD-10-CM

## 2020-02-27 DIAGNOSIS — J98.01 ACUTE BRONCHOSPASM: Primary | ICD-10-CM

## 2020-02-27 DIAGNOSIS — R09.82 POSTNASAL DRIP: ICD-10-CM

## 2020-02-27 DIAGNOSIS — E78.00 HYPERCHOLESTEROLEMIA: ICD-10-CM

## 2020-02-27 DIAGNOSIS — J01.90 ACUTE NON-RECURRENT SINUSITIS, UNSPECIFIED LOCATION: ICD-10-CM

## 2020-02-27 DIAGNOSIS — E27.1 AUTOIMMUNE ADDISON'S DISEASE (H): ICD-10-CM

## 2020-02-27 DIAGNOSIS — R07.89 CHEST WALL PAIN: ICD-10-CM

## 2020-02-27 PROCEDURE — 71046 X-RAY EXAM CHEST 2 VIEWS: CPT | Mod: FY

## 2020-02-27 PROCEDURE — 99214 OFFICE O/P EST MOD 30 MIN: CPT | Performed by: FAMILY MEDICINE

## 2020-02-27 ASSESSMENT — MIFFLIN-ST. JEOR: SCORE: 1364.31

## 2020-02-27 NOTE — PATIENT INSTRUCTIONS
1.  Run a cold air vaporizer as much as possible. If you cannot,  boil water and breath the warm vapors 2-3 times a day to try to open up the sinuses take 2400mgm of guaifenesin per 24 hours   You can do this by taking  Mucinex plain blue  1200 mg  One tablet twice a day (This may come as 600mg/tablet and you need to take 2 tabs twice a day) or you could buy the cheaper  generic 400mgm / tab and take 2 tablets 3 x a day or 1 and 1/2 tablets 4 x a day . .Guaifenesin is  the major component of most cough syrups, because it makes the mucus less thick, and therefore it drains out better and you are less likely to cough from it dripping on the back of your throat.  Irrigate the  nose with plain water under the kitchen sink faucet or the shower.  [place] pots, spray bottles, etc accumulate bacteria and are not recommended.   The tickle in the throat is also helped by gargling with vinegar and honey mixture, or pop or mouth wash as these coat the throat.  Please try to rinse teeth with water after using these .   Do not use sudafed or pseudephedrine as it dries the mucus up so it is harder to get it out, and it can raise your BP     2. If using the MDI  = handheld albuterol,  Let all your air out ,holding the inhaler about an inch form the lips  then hit the MDI  To release the med while  forcefully taking in  a deep breath.  Hold nose and mouth to keep it in  And push down to get it into lungs    Always do the albuterol 2 x  With 3 min in between   If doing  Another med eg  spiriva or steroid inhalers,  Wait 3min after the last albuterol inhalation to do these

## 2020-02-27 NOTE — PROGRESS NOTES
Subjective     Triyn Florian is a 49 year old female who presents to clinic today for the following health issues:    HPI     ENT Symptoms             Symptoms: cc Present Absent Comment   Fever/Chills  x     Fatigue  x     Muscle Aches   x    Eye Irritation   x    Sneezing   x    Nasal Emigdio/Drg  x     Sinus Pressure/Pain   x    Loss of smell   x    Dental pain   x    Sore Throat  x     Swollen Glands   x    Ear Pain/Fullness   x    Cough  x     Wheeze  x     Chest Pain  x  Ant lower ribs and low back   Shortness of breath  x     Rash   x    Other   x      Symptom duration:  x 1 week   Symptom severity:  Severe   Treatments tried:  Azithromycin on 2-24-20   Has tripled her usual steroid dose for addisons for 7 d now -no help with these 2 --the same    Contacts:  None               Reviewed and updated as needed this visit by Provider         Review of Systems   ROS COMP: CONSTITUTIONAL:POSITIVE  for , chills, fatigue, fever  and malaise  INTEGUMENTARY/SKIN: NEGATIVE for worrisome rashes, moles or lesions  EYES: NEGATIVE for vision changes or irritation  ENT/MOUTH: NEGATIVE for ear, mouth and throat problems, POSITIVE for , fever, hoarse voice, nasal congestion, postnasal drainage, rhinorrhea-clear, sinus pressure and sore throat  RESP:POSITIVE for , cough-productive, dyspnea on exertion and wheezing  BREAST: NEGATIVE for masses, tenderness or discharge  CV: NEGATIVE for chest pain, palpitations or peripheral edema  GI: NEGATIVE for nausea, abdominal pain, heartburn, or change in bowel habits  : NEGATIVE for frequency, dysuria, or hematuria  MUSCULOSKELETAL: NEGATIVE for significant arthralgias or myalgia  NEURO: NEGATIVE for weakness, dizziness or paresthesias  ENDOCRINE: NEGATIVE for temperature intolerance, skin/hair changes  HEME: NEGATIVE for bleeding problems  PSYCHIATRIC: NEGATIVE for changes in mood or affect      Objective    /80   Pulse 68   Temp 97.8  F (36.6  C) (Tympanic)   Resp 18   Ht  "1.753 m (5' 9\")   Wt 68.5 kg (151 lb)   LMP  (LMP Unknown)   SpO2 97%   Breastfeeding No   BMI 22.30 kg/m    Body mass index is 22.3 kg/m .  Physical Exam   GENERAL: alert, coughing and tired= distress and fatigued  EYES: Eyes grossly normal to inspection, PERRL and conjunctivae and sclerae normal  HENT: ear canals and TM's normal, nose and mouth without ulcers or lesions  NECK: no adenopathy, no asymmetry, masses, or scars and thyroid normal to palpation  CV: regular rate and rhythm, normal S1 S2, no S3 or S4, no murmur, click or rub, no peripheral edema and peripheral pulses strong  CHEST : tender bilat ant lower ribs ; inspiratory wheeze in bilat lower post ; no SOB   MS: no gross musculoskeletal defects noted, no edema  SKIN: no suspicious lesions or rashes  NEURO: Normal strength and tone, mentation intact and speech normal  PSYCH: mentation appears normal, affect normal/bright  LYMPH: no cervical, supraclavicular,  adenopathy    Diagnostic Test Results:CXR = wnl   Labs reviewed in Epic        Assessment & Plan       ICD-10-CM    1. Acute bronchospasm J98.01 XR Chest 2 Views   2. Acute bronchitis, unspecified organism J20.9 XR Chest 2 Views   3. Acute non-recurrent sinusitis, unspecified location J01.90    4. Postnasal drip R09.82    5. 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 Chest wall pain-bilat ant lower ribs from coughing  R07.89    7. Non-recurrent acute serous otitis media of left ear H65.02    8. Hypercholesterolemia E78.00           Patient Instructions   1.  Run a cold air vaporizer as much as possible. If you cannot,  boil water and breath the warm vapors 2-3 times a day to try to open up the sinuses take 2400mgm of guaifenesin per 24 hours   You can do this by taking  Mucinex plain blue  1200 mg  One tablet twice a day (This may come as 600mg/tablet and you need to take 2 tabs twice a day) or you could buy the cheaper  generic 400mgm / tab and take 2 tablets 3 x a day or 1 and 1/2 tablets 4 " x a day . .Guaifenesin is  the major component of most cough syrups, because it makes the mucus less thick, and therefore it drains out better and you are less likely to cough from it dripping on the back of your throat.  Irrigate the  nose with plain water under the kitchen sink faucet or the shower.  [place] pots, spray bottles, etc accumulate bacteria and are not recommended.   The tickle in the throat is also helped by gargling with vinegar and honey mixture, or pop or mouth wash as these coat the throat.  Please try to rinse teeth with water after using these .   Do not use sudafed or pseudephedrine as it dries the mucus up so it is harder to get it out, and it can raise your BP     2. If using the MDI  = handheld albuterol,  Let all your air out ,holding the inhaler about an inch form the lips  then hit the MDI  To release the med while  forcefully taking in  a deep breath.  Hold nose and mouth to keep it in  And push down to get it into lungs    Always do the albuterol 2 x  With 3 min in between   If doing  Another med eg  spiriva or steroid inhalers,  Wait 3min after the last albuterol inhalation to do these           Return in about 10 months (around 12/27/2020) for Physical Exam, thyroid.    Mary Aguilar MD  [facility]

## 2020-03-04 ENCOUNTER — DOCUMENTATION ONLY (OUTPATIENT)
Dept: FAMILY MEDICINE | Facility: CLINIC | Age: 50
End: 2020-03-04

## 2020-03-04 NOTE — PROGRESS NOTES
Reason for call:  Form   Our goal is to have forms completed within 72 hours, however some forms may require a visit or additional information.     Who is the form from? Insurance comp  Where did the form come from? form was faxed in  What clinic location was the form placed at?   Where was the form placed? Folder Box/Folder  What number is listed as a contact on the form? [phone]    Phone call message - patient request for a letter, form or note:     Date needed: as soon as possible  Please fax to [phone]  Has the patient signed a consent form for release of information? NO    Additional comments:     Type of letter, form or note: disability    Phone number to reach patient:      Best Time:      Can we leave a detailed message on this number?      Travel screening:

## 2020-03-05 ENCOUNTER — TELEPHONE (OUTPATIENT)
Dept: FAMILY MEDICINE | Facility: CLINIC | Age: 50
End: 2020-03-05

## 2020-03-05 ENCOUNTER — OFFICE VISIT (OUTPATIENT)
Dept: FAMILY MEDICINE | Facility: CLINIC | Age: 50
End: 2020-03-05
Payer: COMMERCIAL

## 2020-03-05 ENCOUNTER — ANCILLARY PROCEDURE (OUTPATIENT)
Dept: GENERAL RADIOLOGY | Facility: CLINIC | Age: 50
End: 2020-03-05
Attending: FAMILY MEDICINE
Payer: COMMERCIAL

## 2020-03-05 VITALS
HEART RATE: 60 BPM | OXYGEN SATURATION: 98 % | RESPIRATION RATE: 18 BRPM | WEIGHT: 152 LBS | DIASTOLIC BLOOD PRESSURE: 64 MMHG | TEMPERATURE: 98 F | BODY MASS INDEX: 22.45 KG/M2 | SYSTOLIC BLOOD PRESSURE: 116 MMHG

## 2020-03-05 DIAGNOSIS — R09.89 ABNORMAL FINDING OF LUNG: ICD-10-CM

## 2020-03-05 DIAGNOSIS — J11.1 INFLUENZA-LIKE ILLNESS: Primary | ICD-10-CM

## 2020-03-05 DIAGNOSIS — J01.00 ACUTE NON-RECURRENT MAXILLARY SINUSITIS: ICD-10-CM

## 2020-03-05 DIAGNOSIS — R05.9 COUGH: ICD-10-CM

## 2020-03-05 PROCEDURE — 99214 OFFICE O/P EST MOD 30 MIN: CPT | Performed by: FAMILY MEDICINE

## 2020-03-05 PROCEDURE — 71046 X-RAY EXAM CHEST 2 VIEWS: CPT

## 2020-03-05 NOTE — TELEPHONE ENCOUNTER
Pt needs a letter stating that she can go back to work on Monday. This is in addition to the one that was sent over from her employer. Pt states that she needs a form to return to work but is not sure if she needs to be seen? Please assist. Thanks!     Triny Sexton  [employer] Patient Rep

## 2020-03-05 NOTE — PROGRESS NOTES
Writer filled out forms to the best of her knowledge & placed forms on PCP's desk.     Patient has an appt scheduled for today at 10:40 am with PCP to address forms.    Ewa THOMAS     [facility]

## 2020-03-05 NOTE — RESULT ENCOUNTER NOTE
Malcolm Agosto,  Good news - the radiologist agreed that your chest xray is normal.  Please let me know if you have any further questions or concerns.  Sincerely,  Dr. Mishel Mosher MD    Thank you for choosing the [facility] as your health care provider. Please don't hesitate to call with any questions or concerns [phone].

## 2020-03-05 NOTE — PATIENT INSTRUCTIONS
1. I strongly suspect influenza - but wasn't tested.  2. Chest x-ray normal today, will await radiology read as well.  3. Does have maxillary sinus tenderness bilaterally with 2+ weeks of upper respiratory infection - may be bacterial sinus infection at this point.  If no improvement in 1-2 days with sinus saline rinse should start Augmentin.  4. Short term disability form completed and faxed.  5. Discussed with patient, all questions answered, in agreement with this plan, will return or seek further care if not improving or worsening.

## 2020-03-05 NOTE — PROGRESS NOTES
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  Started on 2/20.  Got quickly worse with 1 week of cough, copious nasal congestion, body aches.  No fever but is on florinef and prednisone and hasn't had fevers in years.  No second week cough is better but still really low energy, and pain in face and sinuses.  Gets exhausted from simply walking the dog 10 minutes.    Reviewed and updated as needed this visit by Provider  Tobacco  Allergies  Meds  Problems  Med Hx  Surg Hx  Fam Hx         Review of Systems   ROS COMP: Constitutional, HEENT, cardiovascular, pulmonary, gi and gu systems are negative, except as otherwise noted.      Objective    /64   Pulse 60   Temp 98  F (36.7  C)   Resp 18   Wt 68.9 kg (152 lb)   LMP  (LMP Unknown)   SpO2 98%   BMI 22.45 kg/m    Body mass index is 22.45 kg/m .  Physical Exam   GENERAL: alert, no distress, fatigued and ill appearing but not toxic, no fever  EYES: Eyes grossly normal to inspection, PERRL and conjunctivae and sclerae normal  HENT: normal cephalic/atraumatic, ear canals and TM's normal, nose and mouth without ulcers or lesions, nasal mucosa edematous , rhinorrhea clear, oropharynx clear, oral mucous membranes moist and sinuses: maxillary tenderness on both sides  NECK: no adenopathy, no asymmetry, masses, or scars and thyroid normal to palpation  RESP: lungs clear to auscultation - no rales, rhonchi or wheezes and except popping right lower lung, otherwise clear  CV: regular rate and rhythm, normal S1 S2, no S3 or S4, no murmur, click or rub, no peripheral edema and peripheral pulses strong  ABDOMEN: soft, nontender, no hepatosplenomegaly, no masses and bowel sounds normal  MS: no gross musculoskeletal defects noted, no edema  SKIN: no suspicious lesions or  rashes  NEURO: Normal strength and tone, mentation intact and speech normal  PSYCH: mentation appears normal, affect normal/bright    Diagnostic Test Results:  chest x-ray normal by my read, no acute infiltrate or abnormality, will await radiology read as well.        Assessment & Plan       ICD-10-CM    1. Influenza-like illness R69    2. Acute non-recurrent maxillary sinusitis J01.00 amoxicillin-clavulanate (AUGMENTIN) 875-125 MG tablet   3. Cough R05 XR Chest 2 Views   4. Abnormal finding of lung R09.89 XR Chest 2 Views       Patient Instructions   1. I strongly suspect influenza - but wasn't tested.  2. Chest x-ray normal today, will await radiology read as well.  3. Does have maxillary sinus tenderness bilaterally with 2+ weeks of upper respiratory infection - may be bacterial sinus infection at this point.  If no improvement in 1-2 days with sinus saline rinse should start Augmentin.  4. Short term disability form completed and faxed.  5. Discussed with patient, all questions answered, in agreement with this plan, will return or seek further care if not improving or worsening.        Return in about 1 week (around 3/12/2020) for and sooner if worsening or not improving.    Mishel Mosher MD  [facility]

## 2020-03-05 NOTE — TELEPHONE ENCOUNTER
Pt was not seen by DR Butler for this issue, and it looks like she was off work for over a week, so should do an evisit to have these forms filled out- or else Dr Butler has openings today.   Please advise pt.  Thanks!     Triny Estrada RN

## 2020-04-02 ENCOUNTER — MYC MEDICAL ADVICE (OUTPATIENT)
Dept: FAMILY MEDICINE | Facility: CLINIC | Age: 50
End: 2020-04-02

## 2020-04-02 DIAGNOSIS — J11.1 INFLUENZA-LIKE ILLNESS: Primary | ICD-10-CM

## 2020-04-15 NOTE — TELEPHONE ENCOUNTER
I've heard that they are doing testing for those working at [employer] - and for starters only about 80 a day.  As soon as it becomes available and I can order it I'll let her know!  Dr. Mishel Mosher MD / [facility]

## 2020-04-29 ENCOUNTER — MYC MEDICAL ADVICE (OUTPATIENT)
Dept: FAMILY MEDICINE | Facility: CLINIC | Age: 50
End: 2020-04-29

## 2020-04-30 DIAGNOSIS — J11.1 INFLUENZA-LIKE ILLNESS: ICD-10-CM

## 2020-04-30 PROCEDURE — 86769 SARS-COV-2 COVID-19 ANTIBODY: CPT | Performed by: FAMILY MEDICINE

## 2020-05-02 LAB
COVID-19 SPIKE RBD ABY TITER: NORMAL
COVID-19 SPIKE RBD ABY: NEGATIVE

## 2020-05-13 DIAGNOSIS — E27.1 AUTOIMMUNE ADDISON'S DISEASE (H): ICD-10-CM

## 2020-05-13 RX ORDER — PREDNISONE 5 MG/1
TABLET ORAL
Qty: 180 TABLET | Refills: 3 | Status: SHIPPED | OUTPATIENT
Start: 2020-05-13 | End: 2021-01-08

## 2020-05-13 NOTE — TELEPHONE ENCOUNTER
Requested Prescriptions   Pending Prescriptions Disp Refills     predniSONE (DELTASONE) 5 MG tablet 180 tablet 3     Sig: TAKE ONE TO ONE AND ONE-HALF TABLETS BY MOUTH DAILY       There is no refill protocol information for this order              Last Written Prescription Date:  3/11/19  Last Fill Quantity: 180,   # refills: 3  Last Office Visit: 3/5/20  Future Office visit:       Routing refill request to provider for review/approval because:  Drug not on the G, P or [facility] refill protocol or controlled substance

## 2020-07-09 DIAGNOSIS — B00.9 RECURRENT HERPES SIMPLEX: ICD-10-CM

## 2020-07-09 RX ORDER — VALACYCLOVIR HYDROCHLORIDE 1 G/1
TABLET, FILM COATED ORAL
Qty: 30 TABLET | Refills: 0 | Status: SHIPPED | OUTPATIENT
Start: 2020-07-09 | End: 2021-01-08

## 2020-07-09 NOTE — TELEPHONE ENCOUNTER
Medication is being filled for 1 time refill only due to:  Patient needs to be seen because it has been more than one year since last visit.   Please call to schedule annual exam.  Danuta Sin RN

## 2020-07-11 ENCOUNTER — MYC MEDICAL ADVICE (OUTPATIENT)
Dept: FAMILY MEDICINE | Facility: CLINIC | Age: 50
End: 2020-07-11

## 2020-07-11 NOTE — TELEPHONE ENCOUNTER
Spoke to pt. She only takes this med as needed. She will wait to schedule her yearly closer to December 2020. ShoutOut message sent as well.

## 2020-09-18 ENCOUNTER — TRANSFERRED RECORDS (OUTPATIENT)
Dept: HEALTH INFORMATION MANAGEMENT | Facility: CLINIC | Age: 50
End: 2020-09-18

## 2020-12-14 ENCOUNTER — HEALTH MAINTENANCE LETTER (OUTPATIENT)
Age: 50
End: 2020-12-14

## 2020-12-22 ENCOUNTER — HOSPITAL ENCOUNTER (OUTPATIENT)
Dept: MAMMOGRAPHY | Facility: CLINIC | Age: 50
Discharge: HOME | End: 2020-12-22
Attending: FAMILY MEDICINE | Admitting: FAMILY MEDICINE
Payer: COMMERCIAL

## 2020-12-22 DIAGNOSIS — Z12.31 VISIT FOR SCREENING MAMMOGRAM: ICD-10-CM

## 2020-12-22 PROCEDURE — 77063 BREAST TOMOSYNTHESIS BI: CPT

## 2020-12-23 NOTE — RESULT ENCOUNTER NOTE
Christie Agosto:  Good news!  Your mammogram looks normal and reassuring.  The breast center will send along a more detailed report.  Let us know if you have any questions about this.  Best,  Dr. Mishel Mosher MD  [facility]  [phone]

## 2021-01-08 ENCOUNTER — OFFICE VISIT (OUTPATIENT)
Dept: FAMILY MEDICINE | Facility: CLINIC | Age: 51
End: 2021-01-08
Payer: COMMERCIAL

## 2021-01-08 VITALS
RESPIRATION RATE: 13 BRPM | TEMPERATURE: 97.9 F | BODY MASS INDEX: 22.99 KG/M2 | DIASTOLIC BLOOD PRESSURE: 58 MMHG | WEIGHT: 155.2 LBS | HEIGHT: 69 IN | SYSTOLIC BLOOD PRESSURE: 92 MMHG | HEART RATE: 68 BPM | OXYGEN SATURATION: 99 %

## 2021-01-08 DIAGNOSIS — B00.9 RECURRENT HERPES SIMPLEX: ICD-10-CM

## 2021-01-08 DIAGNOSIS — E78.00 HYPERCHOLESTEROLEMIA: ICD-10-CM

## 2021-01-08 DIAGNOSIS — Z00.00 ROUTINE GENERAL MEDICAL EXAMINATION AT A HEALTH CARE FACILITY: Primary | ICD-10-CM

## 2021-01-08 DIAGNOSIS — E27.1 AUTOIMMUNE ADDISON'S DISEASE (H): ICD-10-CM

## 2021-01-08 DIAGNOSIS — E06.3 HYPOTHYROIDISM DUE TO HASHIMOTO'S THYROIDITIS: ICD-10-CM

## 2021-01-08 LAB
CREAT UR-MCNC: 160 MG/DL
MICROALBUMIN UR-MCNC: 6 MG/L
MICROALBUMIN/CREAT UR: 3.44 MG/G CR (ref 0–25)

## 2021-01-08 PROCEDURE — 99396 PREV VISIT EST AGE 40-64: CPT | Performed by: NURSE PRACTITIONER

## 2021-01-08 PROCEDURE — 80053 COMPREHEN METABOLIC PANEL: CPT | Performed by: NURSE PRACTITIONER

## 2021-01-08 PROCEDURE — 36415 COLL VENOUS BLD VENIPUNCTURE: CPT | Performed by: NURSE PRACTITIONER

## 2021-01-08 PROCEDURE — 84443 ASSAY THYROID STIM HORMONE: CPT | Performed by: NURSE PRACTITIONER

## 2021-01-08 PROCEDURE — 80061 LIPID PANEL: CPT | Performed by: NURSE PRACTITIONER

## 2021-01-08 PROCEDURE — 82043 UR ALBUMIN QUANTITATIVE: CPT | Performed by: NURSE PRACTITIONER

## 2021-01-08 PROCEDURE — 99214 OFFICE O/P EST MOD 30 MIN: CPT | Mod: 25 | Performed by: NURSE PRACTITIONER

## 2021-01-08 RX ORDER — FLUDROCORTISONE ACETATE 0.1 MG/1
TABLET ORAL
Qty: 270 TABLET | Refills: 3 | Status: SHIPPED | OUTPATIENT
Start: 2021-01-08 | End: 2022-01-03

## 2021-01-08 RX ORDER — LEVOTHYROXINE SODIUM 175 UG/1
175 TABLET ORAL DAILY
Qty: 90 TABLET | Refills: 3 | Status: SHIPPED | OUTPATIENT
Start: 2021-01-08 | End: 2021-03-05

## 2021-01-08 RX ORDER — VALACYCLOVIR HYDROCHLORIDE 1 G/1
1000 TABLET, FILM COATED ORAL 2 TIMES DAILY
Qty: 30 TABLET | Refills: 0 | Status: SHIPPED | OUTPATIENT
Start: 2021-01-08 | End: 2022-01-03

## 2021-01-08 RX ORDER — PREDNISONE 5 MG/1
TABLET ORAL
Qty: 135 TABLET | Refills: 3 | Status: SHIPPED | OUTPATIENT
Start: 2021-01-08 | End: 2022-01-03

## 2021-01-08 ASSESSMENT — MIFFLIN-ST. JEOR: SCORE: 1378.36

## 2021-01-08 ASSESSMENT — ENCOUNTER SYMPTOMS
HEARTBURN: 0
HEMATURIA: 0
ARTHRALGIAS: 1
SHORTNESS OF BREATH: 0
FEVER: 0
CHILLS: 0
JOINT SWELLING: 0
MYALGIAS: 0
PARESTHESIAS: 0
HEMATOCHEZIA: 0
WEAKNESS: 0
DYSURIA: 0
PALPITATIONS: 0
DIARRHEA: 0
HEADACHES: 0
COUGH: 0
NERVOUS/ANXIOUS: 0
EYE PAIN: 0
NAUSEA: 0
FREQUENCY: 0
DIZZINESS: 0
BREAST MASS: 0
SORE THROAT: 0
ABDOMINAL PAIN: 0
CONSTIPATION: 0

## 2021-01-09 LAB
ALBUMIN SERPL-MCNC: 3.6 G/DL (ref 3.4–5)
ALP SERPL-CCNC: 82 U/L (ref 40–150)
ALT SERPL W P-5'-P-CCNC: 24 U/L (ref 0–50)
ANION GAP SERPL CALCULATED.3IONS-SCNC: 7 MMOL/L (ref 3–14)
AST SERPL W P-5'-P-CCNC: 25 U/L (ref 0–45)
BILIRUB SERPL-MCNC: 0.3 MG/DL (ref 0.2–1.3)
BUN SERPL-MCNC: 13 MG/DL (ref 7–30)
CALCIUM SERPL-MCNC: 9.1 MG/DL (ref 8.5–10.1)
CHLORIDE SERPL-SCNC: 107 MMOL/L (ref 94–109)
CHOLEST SERPL-MCNC: 199 MG/DL
CO2 SERPL-SCNC: 23 MMOL/L (ref 20–32)
CREAT SERPL-MCNC: 1.11 MG/DL (ref 0.52–1.04)
GFR SERPL CREATININE-BSD FRML MDRD: 57 ML/MIN/{1.73_M2}
GLUCOSE SERPL-MCNC: 77 MG/DL (ref 70–99)
HDLC SERPL-MCNC: 68 MG/DL
LDLC SERPL CALC-MCNC: 102 MG/DL
NONHDLC SERPL-MCNC: 131 MG/DL
POTASSIUM SERPL-SCNC: 4.3 MMOL/L (ref 3.4–5.3)
PROT SERPL-MCNC: 7 G/DL (ref 6.8–8.8)
SODIUM SERPL-SCNC: 137 MMOL/L (ref 133–144)
TRIGL SERPL-MCNC: 147 MG/DL
TSH SERPL DL<=0.005 MIU/L-ACNC: 1.33 MU/L (ref 0.4–4)

## 2021-07-26 ENCOUNTER — MYC MEDICAL ADVICE (OUTPATIENT)
Dept: FAMILY MEDICINE | Facility: CLINIC | Age: 51
End: 2021-07-26

## 2021-07-27 NOTE — TELEPHONE ENCOUNTER
Dr. Mosher-Please review and advise:  1. Writer planned on recommending evaluation, but unsure if office visit vs Urgent Care would be best for possible need to drain seroma?    Thank you!  FABIO CopelandN, RN  [facility]

## 2021-07-28 NOTE — TELEPHONE ENCOUNTER
"Dr. Mosher-Please review and may close encounter.    Patient's response:  \"Good to know. It is totally improving and not limiting my activity.  Just the seroma is irritating like an extra saddle bag.  Not sure I want to go to a surgeon.  I know it will go away on its own.  Just thought I d see if it was in your wheel house.  Thx!\"    Thank you!  FABIO CopelandN, RN  [facility]    "

## 2021-07-29 ENCOUNTER — OFFICE VISIT (OUTPATIENT)
Dept: SURGERY | Facility: CLINIC | Age: 51
End: 2021-07-29
Payer: COMMERCIAL

## 2021-07-29 VITALS
HEIGHT: 69 IN | SYSTOLIC BLOOD PRESSURE: 120 MMHG | WEIGHT: 152.9 LBS | BODY MASS INDEX: 22.64 KG/M2 | OXYGEN SATURATION: 99 % | TEMPERATURE: 98.7 F | RESPIRATION RATE: 16 BRPM | DIASTOLIC BLOOD PRESSURE: 73 MMHG | HEART RATE: 63 BPM

## 2021-07-29 DIAGNOSIS — T79.2XXA SEROMA DUE TO TRAUMA (H): Primary | ICD-10-CM

## 2021-07-29 PROCEDURE — 99202 OFFICE O/P NEW SF 15 MIN: CPT | Performed by: SURGERY

## 2021-07-29 ASSESSMENT — PAIN SCALES - GENERAL: PAINLEVEL: MILD PAIN (2)

## 2021-07-29 ASSESSMENT — MIFFLIN-ST. JEOR: SCORE: 1362.93
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
## 2021-07-29 NOTE — LETTER
7/29/2021       RE: Triny Florian  [address] [address]     Dear Colleague,    Thank you for referring your patient, Triny Florian, to the [facility] GENERAL SURGERY CLINIC [facility]. Please see a copy of my visit note below.    See dictated note: [id number]    GB        Again, thank you for allowing me to participate in the care of your patient.      Sincerely,    Manan Cee MD

## 2021-10-02 ENCOUNTER — HEALTH MAINTENANCE LETTER (OUTPATIENT)
Age: 51
End: 2021-10-02

## 2021-10-27 ENCOUNTER — MYC MEDICAL ADVICE (OUTPATIENT)
Dept: FAMILY MEDICINE | Facility: CLINIC | Age: 51
End: 2021-10-27

## 2021-10-27 DIAGNOSIS — M72.2 PLANTAR FASCIITIS: Primary | ICD-10-CM

## 2021-10-28 NOTE — TELEPHONE ENCOUNTER
Dr Mosher,    She is asking for foot orthotics.  This was last signed in 2015 for plantar fasciitis  Can you please sign pended order as he needs new one    Thanks    Nieves White RN, BSN  [facility]

## 2021-11-29 ENCOUNTER — TELEPHONE (OUTPATIENT)
Dept: FAMILY MEDICINE | Facility: CLINIC | Age: 51
End: 2021-11-29
Payer: COMMERCIAL

## 2021-11-29 NOTE — TELEPHONE ENCOUNTER
Reason for Call:  Form, our goal is to have forms completed with 72 hours, however, some forms may require a visit or additional information.    Type of letter, form or note:  confirmation of order/ foot longitud/metatarsal sup    Who is the form from?: [employer] (if other please explain)    Where did the form come from: form was faxed in    What clinic location was the form placed at?: [facility]    Where the form was placed: placed in pod A providers signature folder Box/Folder    What number is listed as a contact on the form?: [phone]       Additional comments: patient is requesting 2 more pairs    Call taken on 11/29/2021 at 7:38 AM by Susie Novoa

## 2021-11-30 ENCOUNTER — MEDICAL CORRESPONDENCE (OUTPATIENT)
Dept: HEALTH INFORMATION MANAGEMENT | Facility: CLINIC | Age: 51
End: 2021-11-30
Payer: COMMERCIAL

## 2021-12-03 ENCOUNTER — MYC MEDICAL ADVICE (OUTPATIENT)
Dept: FAMILY MEDICINE | Facility: CLINIC | Age: 51
End: 2021-12-03
Payer: COMMERCIAL

## 2021-12-09 ENCOUNTER — TRANSFERRED RECORDS (OUTPATIENT)
Dept: HEALTH INFORMATION MANAGEMENT | Facility: CLINIC | Age: 51
End: 2021-12-09
Payer: COMMERCIAL

## 2021-12-31 ENCOUNTER — MYC MEDICAL ADVICE (OUTPATIENT)
Dept: FAMILY MEDICINE | Facility: CLINIC | Age: 51
End: 2021-12-31
Payer: COMMERCIAL

## 2022-02-01 ENCOUNTER — TRANSFERRED RECORDS (OUTPATIENT)
Dept: HEALTH INFORMATION MANAGEMENT | Facility: CLINIC | Age: 52
End: 2022-02-01
Payer: COMMERCIAL

## 2022-03-19 ENCOUNTER — HEALTH MAINTENANCE LETTER (OUTPATIENT)
Age: 52
End: 2022-03-19

## 2022-03-25 ENCOUNTER — HOSPITAL ENCOUNTER (OUTPATIENT)
Dept: MAMMOGRAPHY | Facility: CLINIC | Age: 52
Discharge: HOME | End: 2022-03-25
Attending: FAMILY MEDICINE | Admitting: FAMILY MEDICINE
Payer: COMMERCIAL

## 2022-03-25 DIAGNOSIS — Z12.31 VISIT FOR SCREENING MAMMOGRAM: ICD-10-CM

## 2022-03-25 PROCEDURE — 77067 SCR MAMMO BI INCL CAD: CPT

## 2022-05-15 ASSESSMENT — ENCOUNTER SYMPTOMS
SHORTNESS OF BREATH: 0
PARESTHESIAS: 0
DYSURIA: 0
SORE THROAT: 0
EYE PAIN: 0
JOINT SWELLING: 1
FREQUENCY: 0
DIARRHEA: 0
HEMATOCHEZIA: 0
HEADACHES: 0
HEMATURIA: 0
WEAKNESS: 0
ARTHRALGIAS: 1
FEVER: 0
CHILLS: 0
NERVOUS/ANXIOUS: 0
PALPITATIONS: 0
ABDOMINAL PAIN: 0
HEARTBURN: 0
COUGH: 0
NAUSEA: 0
DIZZINESS: 0
MYALGIAS: 0
CONSTIPATION: 0
BREAST MASS: 0

## 2022-05-18 ENCOUNTER — OFFICE VISIT (OUTPATIENT)
Dept: FAMILY MEDICINE | Facility: CLINIC | Age: 52
End: 2022-05-18
Payer: COMMERCIAL

## 2022-05-18 VITALS
RESPIRATION RATE: 15 BRPM | OXYGEN SATURATION: 99 % | DIASTOLIC BLOOD PRESSURE: 63 MMHG | WEIGHT: 155 LBS | SYSTOLIC BLOOD PRESSURE: 102 MMHG | HEART RATE: 53 BPM | HEIGHT: 69 IN | TEMPERATURE: 97.7 F | BODY MASS INDEX: 22.96 KG/M2

## 2022-05-18 DIAGNOSIS — Z00.00 ROUTINE GENERAL MEDICAL EXAMINATION AT A HEALTH CARE FACILITY: Primary | ICD-10-CM

## 2022-05-18 DIAGNOSIS — E06.3 HYPOTHYROIDISM DUE TO HASHIMOTO'S THYROIDITIS: ICD-10-CM

## 2022-05-18 DIAGNOSIS — E27.1 AUTOIMMUNE ADDISON'S DISEASE (H): ICD-10-CM

## 2022-05-18 DIAGNOSIS — E03.8 OTHER SPECIFIED HYPOTHYROIDISM: ICD-10-CM

## 2022-05-18 DIAGNOSIS — Z11.59 NEED FOR HEPATITIS C SCREENING TEST: ICD-10-CM

## 2022-05-18 DIAGNOSIS — Z13.220 SCREENING FOR HYPERLIPIDEMIA: ICD-10-CM

## 2022-05-18 PROBLEM — J98.01 ACUTE BRONCHOSPASM: Status: RESOLVED | Noted: 2020-02-27 | Resolved: 2022-05-18

## 2022-05-18 PROBLEM — E78.00 HYPERCHOLESTEROLEMIA: Status: RESOLVED | Noted: 2020-02-27 | Resolved: 2022-05-18

## 2022-05-18 LAB — FSH SERPL-ACNC: 58.8 IU/L

## 2022-05-18 PROCEDURE — 84439 ASSAY OF FREE THYROXINE: CPT | Performed by: FAMILY MEDICINE

## 2022-05-18 PROCEDURE — 86803 HEPATITIS C AB TEST: CPT | Performed by: FAMILY MEDICINE

## 2022-05-18 PROCEDURE — 99214 OFFICE O/P EST MOD 30 MIN: CPT | Mod: 25 | Performed by: FAMILY MEDICINE

## 2022-05-18 PROCEDURE — 84443 ASSAY THYROID STIM HORMONE: CPT | Performed by: FAMILY MEDICINE

## 2022-05-18 PROCEDURE — 83001 ASSAY OF GONADOTROPIN (FSH): CPT | Performed by: FAMILY MEDICINE

## 2022-05-18 PROCEDURE — 36415 COLL VENOUS BLD VENIPUNCTURE: CPT | Performed by: FAMILY MEDICINE

## 2022-05-18 PROCEDURE — 80061 LIPID PANEL: CPT | Performed by: FAMILY MEDICINE

## 2022-05-18 PROCEDURE — 99396 PREV VISIT EST AGE 40-64: CPT | Performed by: FAMILY MEDICINE

## 2022-05-18 RX ORDER — PREDNISONE 5 MG/1
TABLET ORAL
Qty: 135 TABLET | Refills: 3 | Status: SHIPPED | OUTPATIENT
Start: 2022-05-18 | End: 2023-02-22

## 2022-05-18 RX ORDER — FLUDROCORTISONE ACETATE 0.1 MG/1
TABLET ORAL
Qty: 270 TABLET | Refills: 3 | Status: SHIPPED | OUTPATIENT
Start: 2022-05-18 | End: 2023-08-30

## 2022-05-18 RX ORDER — LEVOTHYROXINE SODIUM 175 UG/1
175 TABLET ORAL DAILY
Qty: 90 TABLET | Refills: 3 | Status: SHIPPED | OUTPATIENT
Start: 2022-05-18 | End: 2023-02-22

## 2022-05-18 ASSESSMENT — ENCOUNTER SYMPTOMS
HEARTBURN: 0
WEAKNESS: 0
HEMATOCHEZIA: 0
BREAST MASS: 0
PARESTHESIAS: 0
PALPITATIONS: 0
ARTHRALGIAS: 1
FREQUENCY: 0
DIZZINESS: 0
DIARRHEA: 0
FEVER: 0
ABDOMINAL PAIN: 0
MYALGIAS: 0
CHILLS: 0
CONSTIPATION: 0
HEMATURIA: 0
NAUSEA: 0
JOINT SWELLING: 1
EYE PAIN: 0
NERVOUS/ANXIOUS: 0
DYSURIA: 0
SHORTNESS OF BREATH: 0
HEADACHES: 0
COUGH: 0
SORE THROAT: 0

## 2022-05-18 NOTE — PROGRESS NOTES
SUBJECTIVE:   CC: Triny Florian is an 52 year old woman who presents for preventive health visit.       Patient has been advised of split billing requirements and indicates understanding: Yes  Healthy Habits:     Getting at least 3 servings of Calcium per day:  Yes    Bi-annual eye exam:  Yes    Dental care twice a year:  Yes    Sleep apnea or symptoms of sleep apnea:  None    Diet:  Regular (no restrictions)    Frequency of exercise:  4-5 days/week    Duration of exercise:  45-60 minutes    Taking medications regularly:  Yes    Medication side effects:  Not applicable    PHQ-2 Total Score: 0    Additional concerns today:  No    Hot nights  Has IUD - when to remove  The 10-year ASCVD risk score (Emile YBARRA Jr., et al., 2013) is: 0.9%    Values used to calculate the score:      Age: 52 years      Sex: Female      Is Non- : No      Diabetic: No      Tobacco smoker: No      Systolic Blood Pressure: 102 mmHg      Is BP treated: No      HDL Cholesterol: 68 mg/dL      Total Cholesterol: 199 mg/dL          Today's PHQ-2 Score:   PHQ-2 ( 1999 Pfizer) 5/15/2022   Q1: Little interest or pleasure in doing things 0   Q2: Feeling down, depressed or hopeless 0   PHQ-2 Score 0   PHQ-2 Total Score (12-17 Years)- Positive if 3 or more points; Administer PHQ-A if positive -   Q1: Little interest or pleasure in doing things Not at all   Q2: Feeling down, depressed or hopeless Not at all   PHQ-2 Score 0       Abuse: Current or Past (Physical, Sexual or Emotional) - No  Do you feel safe in your environment? No    Have you ever done Advance Care Planning? (For example, a Health Directive, POLST, or a discussion with a medical provider or your loved ones about your wishes): No, advance care planning information given to patient to review.  Patient plans to discuss their wishes with loved ones or provider.   -decline    Social History     Tobacco Use     Smoking status: Never Smoker     Smokeless tobacco: Never  Used   Substance Use Topics     Alcohol use: Yes     Comment: socially     If you drink alcohol do you typically have >3 drinks per day or >7 drinks per week? No    Alcohol Use 5/18/2022   Prescreen: >3 drinks/day or >7 drinks/week? -   Prescreen: >3 drinks/day or >7 drinks/week? No       Reviewed orders with patient.  Reviewed health maintenance and updated orders accordingly - Yes      Breast Cancer Screening:    FHS-7:   Breast CA Risk Assessment (FHS-7) 3/25/2022 5/15/2022   Did any of your first-degree relatives have breast or ovarian cancer? No No   Did any of your relatives have bilateral breast cancer? No No   Did any man in your family have breast cancer? Yes Yes   Did any woman in your family have breast and ovarian cancer? No No   Did any woman in your family have breast cancer before age 50 y? No No   Do you have 2 or more relatives with breast and/or ovarian cancer? No No   Do you have 2 or more relatives with breast and/or bowel cancer? No No       Mammogram Screening: Recommended annual mammography  Pertinent mammograms are reviewed under the imaging tab.    History of abnormal Pap smear: NO - age 30-65 PAP every 5 years with negative HPV co-testing recommended  PAP / HPV Latest Ref Rng & Units 11/29/2017 1/14/2015 5/23/2012   PAP (Historical) - NIL NIL NIL   HPV16 NEG:Negative Negative - -   HPV18 NEG:Negative Negative - -   HRHPV NEG:Negative Negative - -     Reviewed and updated as needed this visit by clinical staff   Tobacco  Allergies  Meds  Problems  Med Hx  Surg Hx  Fam Hx  Soc   Hx          Reviewed and updated as needed this visit by Provider      Problems                  Review of Systems   Constitutional: Negative for chills and fever.   HENT: Negative for congestion, ear pain, hearing loss and sore throat.    Eyes: Negative for pain and visual disturbance.   Respiratory: Negative for cough and shortness of breath.    Cardiovascular: Negative for chest pain, palpitations and  "peripheral edema.   Gastrointestinal: Negative for abdominal pain, constipation, diarrhea, heartburn, hematochezia and nausea.   Breasts:  Negative for tenderness, breast mass and discharge.   Genitourinary: Negative for dysuria, frequency, genital sores, hematuria, pelvic pain, urgency, vaginal bleeding and vaginal discharge.   Musculoskeletal: Positive for arthralgias and joint swelling. Negative for myalgias.   Skin: Negative for rash.   Neurological: Negative for dizziness, weakness, headaches and paresthesias.   Psychiatric/Behavioral: Negative for mood changes. The patient is not nervous/anxious.           OBJECTIVE:   /63 (BP Location: Right arm, Patient Position: Chair, Cuff Size: Adult Regular)   Pulse 53   Temp 97.7  F (36.5  C) (Temporal)   Resp 15   Ht 1.74 m (5' 8.5\")   Wt 70.3 kg (155 lb)   LMP 05/11/2022 (Approximate)   SpO2 99%   BMI 23.22 kg/m    Physical Exam  GENERAL: healthy, alert and no distress  EYES: Eyes grossly normal to inspection, PERRL and conjunctivae and sclerae normal  HENT: ear canals and TM's normal, nose and mouth without ulcers or lesions  NECK: no adenopathy, no asymmetry, masses, or scars and thyroid normal to palpation  RESP: lungs clear to auscultation - no rales, rhonchi or wheezes  CV: regular rate and rhythm, normal S1 S2, no S3 or S4, no murmur, click or rub, no peripheral edema and peripheral pulses strong  ABDOMEN: soft, nontender, no hepatosplenomegaly, no masses and bowel sounds normal  MS: no gross musculoskeletal defects noted, no edema  SKIN: no suspicious lesions or rashes  NEURO: Normal strength and tone, mentation intact and speech normal  PSYCH: mentation appears normal, affect normal/bright    Diagnostic Test Results:  Labs reviewed in Epic    ASSESSMENT/PLAN:   Triny was seen today for physical.    Diagnoses and all orders for this visit:    Routine general medical examination at a health care facility  Comments:  Check FSH - menopause?  Has " "IUD in.  UTD on prev screening  Follow up yearly  Orders:  -     REVIEW OF HEALTH MAINTENANCE PROTOCOL ORDERS  -     Follicle stimulating hormone; Future  -     Follicle stimulating hormone    Need for hepatitis C screening test  -     Hepatitis C Screen Reflex to HCV RNA Quant and Genotype; Future  -     Hepatitis C Screen Reflex to HCV RNA Quant and Genotype    Screening for hyperlipidemia  -     Lipid panel reflex to direct LDL Non-fasting; Future  -     Lipid panel reflex to direct LDL Non-fasting    Other specified hypothyroidism  -     TSH WITH FREE T4 REFLEX; Future  -     TSH WITH FREE T4 REFLEX    Autoimmune Bernard's disease (H)  Comments:  Stable  Refilled florinef and prednisone  Follow up yearly  Orders:  -     predniSONE (DELTASONE) 5 MG tablet; TAKE ONE TO ONE AND ONE-HALF TABLETS BY MOUTH DAILY  -     fludrocortisone (FLORINEF) 0.1 MG tablet; TAKE ONE TO THREE TABLETS BY MOUTH DAILY AS NEEDED    Hypothyroidism due to Hashimoto's thyroiditis  Comments:  Stable.  Check TSH  Follow up yearly  Refilled levothyroxine  Orders:  -     levothyroxine (SYNTHROID/LEVOTHROID) 175 MCG tablet; Take 1 tablet (175 mcg) by mouth daily            COUNSELING:  Reviewed preventive health counseling, as reflected in patient instructions    Estimated body mass index is 23.22 kg/m  as calculated from the following:    Height as of this encounter: 1.74 m (5' 8.5\").    Weight as of this encounter: 70.3 kg (155 lb).        She reports that she has never smoked. She has never used smokeless tobacco.      Counseling Resources:  ATP IV Guidelines  Pooled Cohorts Equation Calculator  Breast Cancer Risk Calculator  BRCA-Related Cancer Risk Assessment: FHS-7 Tool  FRAX Risk Assessment  ICSI Preventive Guidelines  Dietary Guidelines for Americans, 2010  USDA's MyPlate  ASA Prophylaxis  Lung CA Screening    Mishel Mosher MD  [facility]  "

## 2022-05-19 LAB — HCV AB SERPL QL IA: NONREACTIVE

## 2022-05-21 LAB
CHOLEST SERPL-MCNC: 209 MG/DL
FASTING STATUS PATIENT QL REPORTED: NO
HDLC SERPL-MCNC: 70 MG/DL
LDLC SERPL CALC-MCNC: 114 MG/DL
NONHDLC SERPL-MCNC: 139 MG/DL
T4 FREE SERPL-MCNC: 1.18 NG/DL (ref 0.76–1.46)
TRIGL SERPL-MCNC: 125 MG/DL
TSH SERPL DL<=0.005 MIU/L-ACNC: 0.17 MU/L (ref 0.4–4)

## 2022-05-23 NOTE — RESULT ENCOUNTER NOTE
Malcolm Agosto:  Nice to see you last week.  Few things with your labs:  1. You are in menopause (post-menopausal).  You can remove that IUD anytime now.  2. Your thyroid looks a little overcorrected - try taking your levothyroxine only 6 days a week and let's recheck in 4-8 weeks with a lab only test.    3. Everything else looks great.  Let me know if you have any questions at all.  -SJ

## 2022-05-27 ENCOUNTER — TRANSFERRED RECORDS (OUTPATIENT)
Dept: HEALTH INFORMATION MANAGEMENT | Facility: CLINIC | Age: 52
End: 2022-05-27
Payer: COMMERCIAL

## 2022-06-01 ENCOUNTER — IMMUNIZATION (OUTPATIENT)
Dept: NURSING | Facility: CLINIC | Age: 52
End: 2022-06-01
Payer: COMMERCIAL

## 2022-06-01 PROCEDURE — 0064A COVID-19,PF,MODERNA (18+ YRS BOOSTER .25ML): CPT

## 2022-06-01 PROCEDURE — 91306 COVID-19,PF,MODERNA (18+ YRS BOOSTER .25ML): CPT

## 2022-08-16 ENCOUNTER — MYC MEDICAL ADVICE (OUTPATIENT)
Dept: FAMILY MEDICINE | Facility: CLINIC | Age: 52
End: 2022-08-16

## 2022-08-17 ENCOUNTER — VIRTUAL VISIT (OUTPATIENT)
Dept: FAMILY MEDICINE | Facility: CLINIC | Age: 52
End: 2022-08-17
Payer: COMMERCIAL

## 2022-08-17 ENCOUNTER — TELEPHONE (OUTPATIENT)
Dept: FAMILY MEDICINE | Facility: CLINIC | Age: 52
End: 2022-08-17

## 2022-08-17 DIAGNOSIS — E27.40 CHRONIC ADRENAL INSUFFICIENCY (H): ICD-10-CM

## 2022-08-17 DIAGNOSIS — E27.1 AUTOIMMUNE ADDISON'S DISEASE (H): ICD-10-CM

## 2022-08-17 DIAGNOSIS — U07.1 INFECTION DUE TO 2019 NOVEL CORONAVIRUS: Primary | ICD-10-CM

## 2022-08-17 PROCEDURE — 99213 OFFICE O/P EST LOW 20 MIN: CPT | Mod: 95 | Performed by: FAMILY MEDICINE

## 2022-08-17 RX ORDER — PREDNISOLONE SODIUM PHOSPHATE 10 MG/1
20 TABLET, ORALLY DISINTEGRATING ORAL DAILY
Qty: 10 TABLET | Refills: 0 | Status: SHIPPED | OUTPATIENT
Start: 2022-08-17 | End: 2022-11-18

## 2022-08-17 NOTE — PROGRESS NOTES
Triny is a 52 year old who is being evaluated via a billable video visit.      How would you like to obtain your AVS? MyChart  If the video visit is dropped, the invitation should be resent by: Text to cell phone: [phone]  Will anyone else be joining your video visit? No         Assessment & Plan     Triny was seen today for covid concern.    Diagnoses and all orders for this visit:    Infection due to 2019 novel coronavirus  -     nirmatrelvir and ritonavir (PAXLOVID) therapy pack; Take 2 tablets by mouth 2 times daily for 5 days (Take 1 tablet of Nirmatelvir and 1 tablet of Ritonavir twice daily for 5 days)  -     prednisoLONE (ORAPRED ODT) 10 MG ODT; Take 2 tablets (20 mg) by mouth daily    Autoimmune Bernard's disease (H)  -     prednisoLONE (ORAPRED ODT) 10 MG ODT; Take 2 tablets (20 mg) by mouth daily    Corticoadrenal insufficiency  -     prednisoLONE (ORAPRED ODT) 10 MG ODT; Take 2 tablets (20 mg) by mouth daily      Reviewed medication Paxlovid with patient and common side effects as well as overall efficacy at preventing serious illness and hospitalization    Reviewed medication interactions and instructed patient not to take any new over-the-counter medicines or herbal supplements    Reviewed kidney function: last GFR in 50s, will send renal dosing    Discussed Covid treatment, symptomatic care and when to seek further care/go to emergency room.    Specific medication adjustments for patient include: on daily prednisone 5 mg, 10 when sick.  This can interact with Paxlovid SO will switch to prednisolone 10 -20 mg daily as no interactions.  Discussed with patient, sent.        Prescription drug management         glomerular filtration rate    .      No follow-ups on file.    Mishel Mosher MD  [facility]    Jaydon Agosto is a 52 year old, presenting for the following health issues:  Covid Concern (Treatment /)      HPI       COVID-19 Symptom Review  How many  "days ago did these symptoms start? 24 hours     Are any of the following symptoms significant for you?    New or worsening difficulty breathing? No    Worsening cough? Yes, I am coughing up mucus.    Fever or chills? Yes, I felt feverish or had chills.    Headache: YES    Sore throat: YES    Chest pain: No    Diarrhea: No    Body aches? YES    What treatments has patient tried? Ibuprofen    Does patient live in a nursing home, group home, or shelter? No  Does patient have a way to get food/medications during quarantined? Yes, I have a friend or family member who can help me.            Review of Systems         Objective    Vitals - Patient Reported  Weight (Patient Reported): 68 kg (150 lb)  Height (Patient Reported): 175.3 cm (5' 9\")  BMI (Based on Pt Reported Ht/Wt): 22.15        Physical Exam   GENERAL: alert, no distress, fatigued ill appearing but not toxic  EYES: Eyes grossly normal to inspection.  No discharge or erythema, or obvious scleral/conjunctival abnormalities.  RESP: No audible wheeze, cough, or visible cyanosis.  No visible retractions or increased work of breathing.    SKIN: Visible skin clear. No significant rash, abnormal pigmentation or lesions.  NEURO: Cranial nerves grossly intact.  Mentation and speech appropriate for age.  PSYCH: Mentation appears normal, affect normal/bright, judgement and insight intact, normal speech and appearance well-groomed.                Video-Visit Details    Video Start Time: 4:15 PM    Type of service:  Video Visit    Video End Time:4:31PM    Originating Location (pt. Location): Home    Distant Location (provider location):  [facility]     Platform used for Video Visit: [employer]    .  ..  "

## 2022-08-17 NOTE — PATIENT INSTRUCTIONS
"  Instructions for Patients  Your COVID-19 test was positive. This means you have the virus. Please follow the \"How can I take care of myself\" and \"How do I self-isolate?\" guidelines in these instructions.    What treatments are available?  Over-the-counter medicines may help with your symptoms such as runny or stuffy nose, cough, chills, and fever. Talk to your care team about your options.     Some people are at high risk for severe illness (for example if you have a weak immune system, you're 65 or older, or you have certain medical problems). If your risk it high and your symptoms started in the last 5 to 7 days, we strongly recommend for you to get COVID treatment as soon as possible before you get really sick. Paxlovid, Molnupiravir and the monoclonal antibody treatments are proven safe and effective, make you feel better faster, and prevent hospitalization and death.       You can schedule an appointment to discuss COVID treatment by requesting an appointment on [place] by selecting \"schedule COVID-19 Treatment\" or by calling [employer] ([phone]) and pressing 7.    What are the symptoms of COVID-19?  Symptoms can include fever, cough, shortness of breath, chills, headache, muscle pain sore throat, fatigue, runny or stuffy nose, and loss of taste and smell. Other less common symptoms include nausea, vomiting, or diarrhea (watery stools).    Know when to call 911. Emergency warning signs include:    Trouble breathing or shortness of breath    Pain or pressure in the chest that doesn't go away    Feeling confused like you haven't felt before, or not being able to wake up    Bluish-colored lips or face    How can I take care of myself?  1. Get lots of rest. Drink extra fluids (unless a doctor has told you not to).  2. Take Tylenol (acetaminophen) for fever or pain. If you have liver or kidney problems, ask your family doctor if it's okay to take Tylenol   Adults:   650 mg (two 325 mg pills or tablets) " every 4 to 6 hours, or...   1,000 mg (two 500 mg pills or tablets) every 8 hours as needed.  Note: Don't take more than 3,000 mg in one day. Acetaminophen is found in many medicines (both prescribed and over the counter). Read all labels to be sure you don't take too much.  For children, check the Tylenol bottle for the right dose. The dose is based on the child's age or weight.  3. Take over the counter medicines for your symptoms as needed. Talk to your pharmacist.  4. If you have other health problems (like cancer, heart failure, an organ transplant, or severe kidney disease): Call your specialty clinic if you don't feel better in the next 2 days.    These guidelines are for isolating and quarantining before returning to work, school or .     For employers, schools and day cares: This is an official notice for this person s medical guidelines for returning in-person.     For health care sites: The CDC gives different isolation and quarantine guidelines for healthcare sites, please check with these sites before arriving.     How do I self-isolate?  You isolate when you have symptoms of COVID or a test shows you have COVID, even if you don t have symptoms.     If you DO have symptoms:  o Stay home and away from others  - For at least 5 days after your symptoms started, AND   - You are fever free for 24 hours (with no medicine that reduces fever), AND  - Your other symptoms are better.  o Wear a mask for 10 full days any time you are around others.    If you DON T have symptoms:  o Stay at home and away from others for at least 5 days after your positive test.  o Wear a mask for 10 full days any time you are around others.    How and when do I quarantine?  You quarantine when you may have been exposed to the virus and DON T have symptoms.     Stay home and away from others.     You must quarantine for 5 days after your last contact with a person who has COVID.  o Note: If you are fully vaccinated, you don t  need to quarantine. You should still follow the steps below.     Wear a mask for 10 full days any time you re around others.    Get tested at least 5 days after you were exposed, even if you don t have symptoms.     If you start to have symptoms, isolate right away and get tested.    Where can I get more information?    [facility] COVID-19 Resource Hub: www.eventuosity.org/covid19/     CDC Quarantine & Isolation: https://www.cdc.gov/coronavirus/2019-ncov/your-health/quarantine-isolation.html     CDC - What to Do If You're Sick: https://www.cdc.gov/coronavirus/2019-ncov/if-you-are-sick/index.html    [facility] clinical trials (COVID-19 research studies): clinicalaffairs.[facility]/umn-clinical-trials    [place] Department of Health COVID-19 Public Hotline: 1-329.525.6272

## 2022-08-17 NOTE — TELEPHONE ENCOUNTER
Dr. Mosher-Please advise if you recommend patient schedule virtual visit to prescribe antiviral?  Per review of MASSBP score patient is considered low risk.    Thank you!  FABIO CopelandN, RN  [facility]

## 2022-08-17 NOTE — CONFIDENTIAL NOTE
Called [facility] [place] to cancel prednisolone prescription per Dr. Mosher's verbal order, after discussion with Gayatri, Pharm D at , and Jacy, Pharm D at [place].    Called patient and instructed to continue prednisone as previously prescribed; no need for a change in steroid.    SURAJ Taylor RN  [facility]

## 2022-08-17 NOTE — TELEPHONE ENCOUNTER
Double booked pt at 4pm; pt aware Dr. Mosher will call as she is able today.    Symptoms began yesterday 8/16/22, positive test today.    SURAJ Taylor RN  [facility]

## 2022-08-17 NOTE — TELEPHONE ENCOUNTER
She is on daily immunosuppressant (prednisone) so I do think she would qualify.    Can you let her know?  I can DB her today if she'd like treatment.      Dr. Mishel Mosher MD / [facility]

## 2022-08-18 ENCOUNTER — MYC MEDICAL ADVICE (OUTPATIENT)
Dept: FAMILY MEDICINE | Facility: CLINIC | Age: 52
End: 2022-08-18

## 2022-08-18 NOTE — LETTER
[facility] [address] [place] [address]  [phone]          August 25, 2022    RE:  Triny Florian                                                                                                                                                       [address] [place] [id number]            To whom it may concern:    Triny Florian is under my professional care for Covid-19 infection. She is cleared to return to work starting 8/24/22 due to health concerns.         Sincerely,        Mishel Mosher MD

## 2022-08-25 NOTE — TELEPHONE ENCOUNTER
Dr. Mosher/  Providers      Dr. Mosher (out of office today) saw pt 8/17/22 for virtual- Covid tx    Pt asking for letter as her work only let her have 5 day quarantine. Missed work Mon-Tues. First day back was yesterday       Pended letter- sign if ok and route back to us to Fax.     Fax to [phone]    SAQIB Groves RN  [facility]

## 2022-09-03 ENCOUNTER — HEALTH MAINTENANCE LETTER (OUTPATIENT)
Age: 52
End: 2022-09-03

## 2022-09-28 ENCOUNTER — ANCILLARY PROCEDURE (OUTPATIENT)
Dept: GENERAL RADIOLOGY | Facility: CLINIC | Age: 52
End: 2022-09-28
Attending: PODIATRIST
Payer: COMMERCIAL

## 2022-09-28 ENCOUNTER — OFFICE VISIT (OUTPATIENT)
Dept: PODIATRY | Facility: CLINIC | Age: 52
End: 2022-09-28

## 2022-09-28 VITALS — DIASTOLIC BLOOD PRESSURE: 64 MMHG | SYSTOLIC BLOOD PRESSURE: 104 MMHG

## 2022-09-28 DIAGNOSIS — M79.671 CHRONIC FOOT PAIN, RIGHT: ICD-10-CM

## 2022-09-28 DIAGNOSIS — M79.671 CHRONIC FOOT PAIN, RIGHT: Primary | ICD-10-CM

## 2022-09-28 DIAGNOSIS — G89.29 CHRONIC FOOT PAIN, RIGHT: ICD-10-CM

## 2022-09-28 DIAGNOSIS — M20.5X1 HALLUX LIMITUS, RIGHT: ICD-10-CM

## 2022-09-28 DIAGNOSIS — G89.29 CHRONIC FOOT PAIN, RIGHT: Primary | ICD-10-CM

## 2022-09-28 PROCEDURE — 73630 X-RAY EXAM OF FOOT: CPT | Mod: TC | Performed by: RADIOLOGY

## 2022-09-28 PROCEDURE — 99203 OFFICE O/P NEW LOW 30 MIN: CPT | Performed by: PODIATRIST

## 2022-09-28 NOTE — PATIENT INSTRUCTIONS
"Thank you for choosing [facility] Podiatry / Foot & Ankle Surgery!    DR. RUIZ'S CLINIC LOCATIONS:     [facility] TRIAGE LINE: [phone]   [address] APPOINTMENTS: [phone]   [place] [address] RADIOLOGY: [phone]   (Every other Tues - Wed - Fri PM) SET UP SURGERY: [phone]    BILLING QUESTIONS: [phone]   [facility] FAX: [phone] [address]    [place] [address]    (Thurs & Fri AM)      MRI next  Continue orthotics    DEGENERATIVE ARTHRITIS OF THE BIG TOE JOINT   (hallux limitus/hallux rigidus)   Arthritis of the joint at the base of the big toe (metatarsophalangeal joint) has several causes. Usually it results from repetitive trauma to the joint, secondary to abnormal foot mechanics. Often it is hereditary. However, a one-time traumatic event can lead to arthritis. The condition doesn't improve with time, and even with treatment, can worsen. The cartilage wears out, joint surfaces are no longer smooth, bone rubs on bone, inflammation occurs with pain, and eventually bone spurs and loose fragments might develop.   The joint is often painful with activity, worse with flimsy shoes or walking barefoot, and it slowly progresses over time. A person might notice the toe \"locking up\" with walking. There often is an obvious, and irritating, bony bump on top of the foot. Shoes might be uncomfortable. In some people the pain is so bothersome that recreational activities sometimes even normal daily activities are difficult to perform.   The pain from this arthritis is likely a combination of joint jamming, cartilage loss and inflammation, and irritation from shoes rubbing on the bump. Sometimes other parts of the foot, leg, or back hurt from altering one's walk to compensate for the painful joint.     Ways to help a person live with the discomfort include wearing a good, supportive shoe with a rigid, rocker-type bottom. An example is a hiking boot. A " rigid sole minimizes bending of the joint, and therefore, joint motion and pain. Shoes with a high toe box allow for less rubbing on the bump. Avoiding barefoot walking, sandals, flip-flops and slippers usually helps.   Sometimes an insert or orthotic provides symptom relief. This might make shoe fit more difficult. Pads over the bump and occasionally injections into the joint provide relief.   Surgery for this condition is aimed towards alleviating pain. It does not cure the arthritis nor does it guarantee better joint motion. Depending on the condition of the metatarsophalangeal joint, there are several surgiqal options:    1.  Cutting off the bony bump(s) and cleaning the joint    2.  Loosening the joint up by making cuts in the first metatarsal bone or the big toe bone and removing a small section of bone.    3.  Repositioning bone to minimize jamming of the joint.    4.  In severe cases, the joint is fused. By fusing the joint, it will never bend again. This resolves the pain, because it's the movement of a worn out joint that causes pain. Oftentimes the operation involves a combination of these procedures and. requires the use of screws, pins, and/or a small surgical plate.     Healing after surgery requires about six weeks of protection. This allows the bone to heal. Maximum recovery takes about one year. The scar tissue and joint structures require this amount of time to finish the healing process. Expect stiffness, swelling and numbness during that time frame.   Surgery for arthritis of the metatarsophalangeal joint does involve side effects. Some side effects are predictable and others are less common but do occur. A scar will be visible and could be irritated by shoes. The shoe may rub on the screw or internal pin requiring surgical removal of these fixation devices. The screw and pin would likely be left in place for a full year. The first toe may remain stiff after surgery. The amount of stiffness is  variable. Most people never regain normal motion of the first toe. This is due to scar tissue inherent to any surgery, in addition to the cumUlative effects of arthritis. Sometimes the big toe drifts to one side or the other. Joint fusion is one option to correct an unstable, drifting toe. This procedure straightens the toe, however, no motion remains.   All surgical procedures involve risk of infection, numbness, pain, delayed bone healing, osteotomy (bone cut) dislocation, blood clots, continued foot pain, etc. Arthritic joint surgery is quite complex and should not be taken lightly.    Any skin incision can lead to infection. Deep infection might involve the bone and thus repeat surgery and six weeks of IV antibiotics. Scar tissue can cause nerve pain or numbness. his is generally temporary but can be permanent. We do not have treatments that cure nerve problems. Second toe pain could be related to altered mechanics and pressure transferred to the second toe. Delayed bone healing would lengthen the healing time. Some bones simply do not heal. This requires repeat surgery, electronic bone stimulation and/or extended protection. Smokers have an approximate 20% chance of poor bone healing. This is double that of a non-smoker. The bone cut may displace. This may need to be repaired with a second operation. Displacement can cause joint malalignment. Immobility after surgery can cause a blood clot in the legs and lungs. This could result in death.   Foot pain is complex. Most feet hurt for more than one reason. Operating on the arthritic   big toe joint will not necessarily create a pain free foot. Appropriate shoes, healthy body weight, avoidance of bare foot walking and moderation of activity will always be   necessary to enjoy foot comfort. Arthritis is incurable even with surgery.     Surgery for this type of arthritis is nevertheless quite successful. Most surgical patients are pleased with their foot following  surgery. Many of the issues described above can be controlled by taking proper care of your foot during the healing process.   Cosmetic bump surgery is discouraged for the reasons listed above. A bump and joint that is comfortable when wearing appropriate shoes should simply be treated with appropriate shoes.   Your surgeon would be happy to fully describe any of the above issues. You should pursue a full understanding of the operation, recovery process and any potential problems that could develop.     PLANTAR FASCIITIS  Plantar fasciitis is often referred to as heel spurs or heel pain. Plantar fasciitis is a very common problem that affects people of all foot shapes, age, weight and activity level. Pain may be in the arch or on the weight-bearing surface of the heel. The pain may come on without injury or identifiable cause. Pain is generally present when first getting out of bed in the morning or up from a seated break.     CAUSES  The plantar fascia is a dense fibrous band of tissue that stretches across the bottom surface of the foot. The fascia helps support the foot muscles and arch. Plantar fasciitis is thought to be caused by mechanical strain or overload. Frequent walking without shoes or wearing unsupportive shoes is thought to cause structural overload and ultimately inflammation of the plantar fascia. Some people have heel spurs that can be seen on x-ray. The heel spur is actually a minor component of plantar fascitis and is largely ignored.       SELF TREATMENT   The easiest solution is to stop walking around your home without shoes. Plantar fasciitis is largely a shoe problem. Shoes are either not being worn often enough or your current shoes are inadequate for your weight, foot structure or activity level. The majority of shoes on the market today are not sufficient to resist development of plantar fasciitis or to promote healing. Assume that your current shoes are inadequate and will need to be  replaced. Even high quality shoes wear out with 6 months to one year of frequent use. Weight loss is another option. Losing ten pounds in the next two months may be enough to resolve the problem. Ice applied to the area of pain two to three times per day for ten minutes each session can be very helpful. Warm foot soaks in epsom salts can also relieve pain. This should continue until the problem resolves. Achilles tendon stretching is essential. Stretch multiple times daily to promote healing and to prevent recurrence in the future. Over all stretching of the body is helpful as well such as the calves, thighs and lower back. Normally when one area of the body is tight, other areas are too. Gentle Yoga can be good for this.     Over the counter topical anti inflammatories can be helpful such as biofreeze, bengay, salon pas, ect...  Oral ibuprofen or aleve is recommended as well to try to calm down inflammation.     Night splints can be helpful to gradually stretch the foot at night as a lot of pain is when you get up in the morning. Taking a towel or thera band and stretching the foot back multiple times before you get ou of bed can be beneficial as well.     MEDICAL TREATMENT  Medical treatments often include custom arch supports, cortisone injections, physical therapy, splints to be worn in bed, prescription medications and surgery. The home treatments listed above will be necessary regardless of these advanced medical treatments. Surgery is rarely needed but is very helpful in selected cases.     PROGNOSIS  Plantar fasciitis can last from one day to a lifetime. Some people get intermittent fascitis that is very short-lived. Others suffer daily for years. Excessive body weight, frequent bare foot walking, long hours on the feet, inadequate shoes, predisposing foot structures and excessive activity such as running are all potential issues that lead to chronic and/or recurring plantar fascitis. Having plantar  fasciitis means that you are forever prone to this problem and will require modification of some of the above factors. Most people seek treatment within one to four months. Healing usually requires a similar one to four month time frame. Healing time is relative to the amount of effort spent treating the problem.   Plantar fasciitis is highly recurrent. Risk factors often continue, including return to bare foot walking, inadequate shoes, excessive body weight, excessive activities, etc. Your life style and foot structure may predispose you to recurrent plantar fasciitis. A daily prevention regimen can be very helpful. Ongoing use of shoe inserts, careful attention to appropriate shoes, daily Achilles stretching, etc. may prevent recurrence. Prompt attention at the earliest warning signs of heel pain can resolve the problem in as short as a few days.     EXERCISES  Stair Exercise: Step on the stairs with the ball of your foot and hold your position for at least 15 seconds, then slowly step down with the heels of your foot. You can do this daily and as often as you want.   Picking the Towel: Sit comfortably and then pick the towel up with your toes. You can use any object other than a towel as long as the material can be soft and you can pick it up with your toes.  Rolling the Bottle: Use a small ball or frozen water bottle and then roll it around with your foot.   Flex the Toes: Sit comfortably and then flex your toes by pointing it towards the floor or towards your body. This will relax and flex your foot and exercise your plantar fascia, the calf, and the Achilles tendon. The inability of the foot to stretch often causes the bunching up of the plantar fascia area leading to the pain.  Calf/Achilles Stretching: Lay on you back and raise one foot, then point your toes towards the floor. See photo below:               Hold each stretch for 10 seconds. Stretch 10 times per set, three sets per day. Morning, afternoon  and evening. If your heel pain is very severe in the morning, consider doing the first set of stretches before you get out of bed.      OVER THE COUNTER INSERT RECOMMENDATIONS  SuperFeet   Sofsole Fit Spenco   Power Step   Walk-Fit Arch Cradles     Most of these can be found at your local [facility], [employer], or online.  **A good high quality over the counter insert should cost around $40-$50      [employer] LOCATIONS  [address]  [phone]   [address]  [phone] [place]  [address] [place] [address]  [phone]   [place]  [address]  [phone]   [place]  [address] [facility] [address] [facility]  [phone] [place] [address]  [phone]   [place]  [address]  [phone]   [place]  [address]  [phone] [place]  [address]  [phone]

## 2022-09-28 NOTE — LETTER
9/28/2022         RE: Triny Florian  [address] [facility] [address]        Dear Colleague,    Thank you for referring your patient, Triny Florian, to the [facility]. Please see a copy of my visit note below.    ASSESSMENT:  Encounter Diagnoses   Name Primary?     Chronic foot pain, right Yes     Hallux limitus, right      MEDICAL DECISION MAKING:  I personally reviewed the x-ray images with Triny.  No acute findings in her region of pain notably near the base of the of the left third and fourth metatarsals.  Degenerative changes of the first metatarsophalangeal joint.    Differential diagnosis includes stress reaction of bone, early osteoarthritis, ligamentous pain.  Stress reaction of bone is the most concerning.    Given the negative x-rays, 3-month duration of pain, lack of improvement, an MRI is ordered.    Given her report Planter fasciitis and hallux limitus, I highly encouraged her to consider stiffer soled shoes and explained how these help offload both structures.  I support her ongoing use of custom orthoses.    We will further discuss the care plan once the MRI results are available.    Disclaimer: This note consists of symbols derived from keyboarding, dictation and/or voice recognition software. As a result, there may be errors in the script that have gone undetected. Please consider this when interpreting information found in this chart.    Caleb Campo DPM, FACFAS, [facility] Department of Podiatry/Foot & Ankle Surgery      ____________________________________________________________________    HPI:       Triny presents today reporting right foot pain.  This pain is a newer pain, now having existed for 3 months.  She reports her pain being rated as high as a 4 out of 10 at worst.  She has pain every day with each step.  Pain is worse after periods of rest.  No injury that she remembers.  She is very active.  She works on her feet as a  CRNA.  She is involved in a mountain biking, strength training, cross-country skiing.    Other or chronic pain that she has lived with includes bilateral plantar fascial pain and right first metatarsophalangeal joint pain.  She reports a history of a cheilectomy on the right.  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 central line placement when being diagnosed     Hepatitis 11/10/2006     History of blood transfusion 1998     Other diseases of lung, not elsewhere classified 3/21/2007    Overview:  pulm nodule: ANNAMARIA suprahilar 7 mm non calcified noted on CXR after TB exposure at work  CT scheduled-     Painful scar 5/23/2012    where feeding tube is     Thyroid disease 1995    hypothyroid   *  *  Past Surgical History:   Procedure Laterality Date     CHOLECYSTECTOMY       COLONOSCOPY  2018     GYN SURGERY       ORTHOPEDIC SURGERY  2016    toe surgery   *  *  Current Outpatient Medications   Medication Sig Dispense Refill     fludrocortisone (FLORINEF) 0.1 MG tablet TAKE ONE TO THREE TABLETS BY MOUTH DAILY AS NEEDED 270 tablet 3     levothyroxine (SYNTHROID/LEVOTHROID) 175 MCG tablet Take 1 tablet (175 mcg) by mouth daily 90 tablet 3     prednisoLONE (ORAPRED ODT) 10 MG ODT Take 2 tablets (20 mg) by mouth daily 10 tablet 0     predniSONE (DELTASONE) 5 MG tablet TAKE ONE TO ONE AND ONE-HALF TABLETS BY MOUTH DAILY 135 tablet 3     valACYclovir (VALTREX) 1000 mg tablet Take 1 tablet (1,000 mg) by mouth 2 times daily 60 tablet 3         EXAM:    Vitals: /64   Breastfeeding No   BMI: There is no height or weight on file to calculate BMI.    Constitutional:  Triny Florian is in no apparent distress, appears well-nourished.  Cooperative with history and physical exam.    Vascular:  Pedal pulses are palpable for both the DP and  PT arteries.  CFT < 3 sec.  No edema.      Neuro: Light touch sensation is intact to the L4, L5, S1 distributions  No evidence of weakness, spasticity, or contracture in the lower extremities.     Derm: Normal texture and turgor.  No erythema, ecchymosis, or cyanosis.  No open lesions.     Musculoskeletal:    Lower extremity muscle strength is normal. No gross deformities.  Higher medial longitudinal arch.  Mild pain on palpation near the right third and fourth tarsometatarsal joints.  No pain on palpation over the metatarsal shafts.  No pain with manipulation of the midfoot joints.  With loading of the right forefoot, significant limitation of hallux dorsiflexion.    X-Ray Findings:  I personally reviewed the right foot images.  Please see comments above    XR FOOT RIGHT G/E 3 VIEWS 9/28/2022 9:37 AM      HISTORY: right midfoot pain; Chronic foot pain, right; Chronic foot  pain, right     COMPARISON: None.                                                                      IMPRESSION: Mild degenerative changes in the first MTP joint. The  remaining joint spaces are unremarkable. No fractures are evident. The  soft tissues are unremarkable.      TURNER MAJOR MD           Again, thank you for allowing me to participate in the care of your patient.        Sincerely,        Caleb Campo DPM

## 2022-09-28 NOTE — PROGRESS NOTES
ASSESSMENT:  Encounter Diagnoses   Name Primary?     Chronic foot pain, right Yes     Hallux limitus, right      MEDICAL DECISION MAKING:  I personally reviewed the x-ray images with Triny.  No acute findings in her region of pain notably near the base of the of the left third and fourth metatarsals.  Degenerative changes of the first metatarsophalangeal joint.    Differential diagnosis includes stress reaction of bone, early osteoarthritis, ligamentous pain.  Stress reaction of bone is the most concerning.    Given the negative x-rays, 3-month duration of pain, lack of improvement, an MRI is ordered.    Given her report Planter fasciitis and hallux limitus, I highly encouraged her to consider stiffer soled shoes and explained how these help offload both structures.  I support her ongoing use of custom orthoses.    We will further discuss the care plan once the MRI results are available.    Disclaimer: This note consists of symbols derived from keyboarding, dictation and/or voice recognition software. As a result, there may be errors in the script that have gone undetected. Please consider this when interpreting information found in this chart.    Caleb Campo DPM, FACFAS, MS    [place] Department of Podiatry/Foot & Ankle Surgery      ____________________________________________________________________    HPI:       Triny presents today reporting right foot pain.  This pain is a newer pain, now having existed for 3 months.  She reports her pain being rated as high as a 4 out of 10 at worst.  She has pain every day with each step.  Pain is worse after periods of rest.  No injury that she remembers.  She is very active.  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    Other or chronic pain that she has lived with includes bilateral plantar fascial pain and right first metatarsophalangeal joint pain.  She reports a history of a cheilectomy on the  right.  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 central line placement when being diagnosed     Hepatitis 11/10/2006     History of blood transfusion 1998     Other diseases of lung, not elsewhere classified 3/21/2007    Overview:  pulm nodule: ANNAMARIA suprahilar 7 mm non calcified noted on CXR after TB exposure at work  CT scheduled-     Painful scar 5/23/2012    where feeding tube is     Thyroid disease 1995    hypothyroid   *  *  Past Surgical History:   Procedure Laterality Date     CHOLECYSTECTOMY       COLONOSCOPY  2018     GYN SURGERY       ORTHOPEDIC SURGERY  2016    toe surgery   *  *  Current Outpatient Medications   Medication Sig Dispense Refill     fludrocortisone (FLORINEF) 0.1 MG tablet TAKE ONE TO THREE TABLETS BY MOUTH DAILY AS NEEDED 270 tablet 3     levothyroxine (SYNTHROID/LEVOTHROID) 175 MCG tablet Take 1 tablet (175 mcg) by mouth daily 90 tablet 3     prednisoLONE (ORAPRED ODT) 10 MG ODT Take 2 tablets (20 mg) by mouth daily 10 tablet 0     predniSONE (DELTASONE) 5 MG tablet TAKE ONE TO ONE AND ONE-HALF TABLETS BY MOUTH DAILY 135 tablet 3     valACYclovir (VALTREX) 1000 mg tablet Take 1 tablet (1,000 mg) by mouth 2 times daily 60 tablet 3         EXAM:    Vitals: /64   Breastfeeding No   BMI: There is no height or weight on file to calculate BMI.    Constitutional:  Triny Florian is in no apparent distress, appears well-nourished.  Cooperative with history and physical exam.    Vascular:  Pedal pulses are palpable for both the DP and PT arteries.  CFT < 3 sec.  No edema.      Neuro: Light touch sensation is intact to the L4, L5, S1 distributions  No evidence of weakness, spasticity, or contracture in the lower extremities.     Derm: Normal texture and turgor.  No erythema, ecchymosis, or cyanosis.   No open lesions.     Musculoskeletal:    Lower extremity muscle strength is normal. No gross deformities.  Higher medial longitudinal arch.  Mild pain on palpation near the right third and fourth tarsometatarsal joints.  No pain on palpation over the metatarsal shafts.  No pain with manipulation of the midfoot joints.  With loading of the right forefoot, significant limitation of hallux dorsiflexion.    X-Ray Findings:  I personally reviewed the right foot images.  Please see comments above    XR FOOT RIGHT G/E 3 VIEWS 9/28/2022 9:37 AM      HISTORY: right midfoot pain; Chronic foot pain, right; Chronic foot  pain, right     COMPARISON: None.                                                                      IMPRESSION: Mild degenerative changes in the first MTP joint. The  remaining joint spaces are unremarkable. No fractures are evident. The  soft tissues are unremarkable.      UTRNER MAJOR MD

## 2022-10-11 ENCOUNTER — HOSPITAL ENCOUNTER (OUTPATIENT)
Dept: MRI IMAGING | Facility: CLINIC | Age: 52
Discharge: HOME | End: 2022-10-11
Attending: PODIATRIST | Admitting: PODIATRIST
Payer: COMMERCIAL

## 2022-10-11 DIAGNOSIS — G89.29 CHRONIC FOOT PAIN, RIGHT: ICD-10-CM

## 2022-10-11 DIAGNOSIS — M79.671 CHRONIC FOOT PAIN, RIGHT: ICD-10-CM

## 2022-10-11 PROCEDURE — 73718 MRI LOWER EXTREMITY W/O DYE: CPT | Mod: 26 | Performed by: RADIOLOGY

## 2022-10-11 PROCEDURE — 73718 MRI LOWER EXTREMITY W/O DYE: CPT | Mod: RT

## 2022-10-14 ENCOUNTER — MYC MEDICAL ADVICE (OUTPATIENT)
Dept: PODIATRY | Facility: CLINIC | Age: 52
End: 2022-10-14

## 2022-10-14 DIAGNOSIS — M20.5X1 HALLUX LIMITUS, RIGHT: ICD-10-CM

## 2022-10-14 DIAGNOSIS — G89.29 CHRONIC FOOT PAIN, RIGHT: Primary | ICD-10-CM

## 2022-10-14 DIAGNOSIS — M79.671 CHRONIC FOOT PAIN, RIGHT: Primary | ICD-10-CM

## 2022-10-14 DIAGNOSIS — M19.079 ARTHRITIS, MIDFOOT: ICD-10-CM

## 2022-10-14 NOTE — TELEPHONE ENCOUNTER
Please see patient's mychart message and advise if order can be placed for xray guided steroid injections or if patient needs to schedule a follow up appt prior.    Mita Powers MBA, ATC

## 2022-10-19 NOTE — TELEPHONE ENCOUNTER
Please see patient's mychart message and advise on the additional order for right great toe injection.    Mita Powers MBA, ATC

## 2022-11-17 ENCOUNTER — OFFICE VISIT (OUTPATIENT)
Dept: ORTHOPEDICS | Facility: CLINIC | Age: 52
End: 2022-11-17
Attending: PODIATRIST
Payer: COMMERCIAL

## 2022-11-17 ENCOUNTER — E-VISIT (OUTPATIENT)
Dept: FAMILY MEDICINE | Facility: CLINIC | Age: 52
End: 2022-11-17
Payer: COMMERCIAL

## 2022-11-17 DIAGNOSIS — M20.5X1 HALLUX LIMITUS, RIGHT: ICD-10-CM

## 2022-11-17 DIAGNOSIS — G89.29 CHRONIC FOOT PAIN, RIGHT: Primary | ICD-10-CM

## 2022-11-17 DIAGNOSIS — M19.079 ARTHRITIS, MIDFOOT: ICD-10-CM

## 2022-11-17 DIAGNOSIS — F33.0 MAJOR DEPRESSIVE DISORDER, RECURRENT EPISODE, MILD (H): Primary | ICD-10-CM

## 2022-11-17 DIAGNOSIS — M79.671 CHRONIC FOOT PAIN, RIGHT: Primary | ICD-10-CM

## 2022-11-17 PROCEDURE — 20604 DRAIN/INJ JOINT/BURSA W/US: CPT | Mod: RT | Performed by: STUDENT IN AN ORGANIZED HEALTH CARE EDUCATION/TRAINING PROGRAM

## 2022-11-17 PROCEDURE — 99422 OL DIG E/M SVC 11-20 MIN: CPT | Performed by: FAMILY MEDICINE

## 2022-11-17 RX ORDER — LIDOCAINE HYDROCHLORIDE 10 MG/ML
0.5 INJECTION, SOLUTION INFILTRATION; PERINEURAL
Status: DISCONTINUED | OUTPATIENT
Start: 2022-11-17 | End: 2024-06-05

## 2022-11-17 RX ORDER — TRIAMCINOLONE ACETONIDE 40 MG/ML
20 INJECTION, SUSPENSION INTRA-ARTICULAR; INTRAMUSCULAR
Status: DISCONTINUED | OUTPATIENT
Start: 2022-11-17 | End: 2024-06-05

## 2022-11-17 RX ADMIN — TRIAMCINOLONE ACETONIDE 20 MG: 40 INJECTION, SUSPENSION INTRA-ARTICULAR; INTRAMUSCULAR at 14:02

## 2022-11-17 RX ADMIN — LIDOCAINE HYDROCHLORIDE 0.5 ML: 10 INJECTION, SOLUTION INFILTRATION; PERINEURAL at 14:01

## 2022-11-17 RX ADMIN — TRIAMCINOLONE ACETONIDE 20 MG: 40 INJECTION, SUSPENSION INTRA-ARTICULAR; INTRAMUSCULAR at 14:01

## 2022-11-17 RX ADMIN — LIDOCAINE HYDROCHLORIDE 0.5 ML: 10 INJECTION, SOLUTION INFILTRATION; PERINEURAL at 14:02

## 2022-11-17 RX ADMIN — TRIAMCINOLONE ACETONIDE 20 MG: 40 INJECTION, SUSPENSION INTRA-ARTICULAR; INTRAMUSCULAR at 13:31

## 2022-11-17 RX ADMIN — LIDOCAINE HYDROCHLORIDE 0.5 ML: 10 INJECTION, SOLUTION INFILTRATION; PERINEURAL at 13:31

## 2022-11-17 ASSESSMENT — PATIENT HEALTH QUESTIONNAIRE - PHQ9
SUM OF ALL RESPONSES TO PHQ QUESTIONS 1-9: 15
10. IF YOU CHECKED OFF ANY PROBLEMS, HOW DIFFICULT HAVE THESE PROBLEMS MADE IT FOR YOU TO DO YOUR WORK, TAKE CARE OF THINGS AT HOME, OR GET ALONG WITH OTHER PEOPLE: SOMEWHAT DIFFICULT
SUM OF ALL RESPONSES TO PHQ QUESTIONS 1-9: 15

## 2022-11-17 NOTE — PATIENT INSTRUCTIONS
1. Chronic foot pain, right    2. Arthritis, midfoot    3. Hallux limitus, right      Ultrasound-guided cortisone injections of the right 3rd and 4th TMT and left great toe MTP joints were performed in clinic today without complication. Please see post injection instructions below.    Return to clinic for follow up with Dr. Campo if symptoms do not improve after 2 weeks.     You may call our direct clinic number ([phone]) at any time with questions or concerns.    Marlys Rich MD, [facility]    JOINT INJECTION AFTERCARE    After any kind of joint injection, it is not uncommon to experience:  - Soreness, swelling or bruising around the injection site.  - Mild numbness, tingling or weakness around the injection site caused by the numbing medicine used before or with the injection.     It is also possible to experience the following effects associated with the specific agent after injection:  - Allergic reaction.  - Increased blood sugar levels for 10-14 days following cortisone injection. If you have diabetes and notice that your blood sugar levels have increased, notify your primary care physician.  - Increased blood pressure levels.  - Mood swings.  - Increased fluid accumulation in the injected joint.    These effects all should resolve within a day or two of your procedure.    Please note that it may take up to 14 days for the steroid (cortisone) medication to start working.     HOME CARE INSTRUCTIONS    - Limit yourself to light activity activity on the day of your procedure. Avoid lifting heavy objects, bending, stooping or twisting.   - You may shower, but please avoid swimming, hot tubs or tub baths for 24 hours following the procedure.   - Take over-the-counter pain medication (NSAIDS or Tylenol) for pain control after your procedure as needed.    - You may use ice packs for 10-15 minutes 3-4 times per day at the injection site to reduce pain and  swelling after your procedure.   + Put ice in a plastic bag  + Place a towel between your skin and the ice bag  + Leave the ice on for no longer than 20 minutes each time to avoid injuring your skin or nerves  + This can be repeated 3-4 times per day for a few days after the injection    SEEK IMMEDIATE MEDICAL CARE IF:    - Pain and swelling get worse rather than better or extend beyond the injection site  - Numbness does not go away after a few hours  - Blood or fluid continues to leak from the injection site  - You experience chest pain  - You have swelling of your face or tongue  - You have trouble breathing or become dizzy  - You develop fever, chills or severe tenderness at the injection site that lasts longer than 1 day    MAKE SURE YOU:    - Understand these instructions  - Watch your condition  - Get help right away if you are not doing well or if you get worse

## 2022-11-17 NOTE — LETTER
11/17/2022         RE: Triny Florian  [address] [facility] [address]        Dear Colleague,    Thank you for referring your patient, Triny Florian, to the [facility] [place]. Please see a copy of my visit note below.    ASSESSMENT & PLAN    1. Chronic foot pain, right    2. Arthritis, midfoot    3. Hallux limitus, right      Ultrasound-guided cortisone injections of the right 3rd tarsometatarsal joint, right 4th tarsometatarsal joint and left great toe metatarsophalangeal joints were performed in clinic today at the request of Dr. Campo without complication. Please see post injection instructions below.    Return to clinic for follow up with Dr. Campo if symptoms do not improve after 2 weeks.     You may call our direct clinic number ([phone]) at any time with questions or concerns.    Marlys Rich MD, [facility]    -----    SUBJECTIVE:  Triny Florian is a 52 year old female who is seen for ultrasound-guided right 3rd and 4th TMT and great toe MTP joint cortisone injections at the request of Dr. Campo.    Small Joint Injection/Arthrocentesis    Date/Time: 11/17/2022 1:31 PM  Performed by: Marlys Villagomez MD  Authorized by: Marlys Villagomez MD   Indications:  Pain  Needle Size:  25 G  Guidance: ultrasound     Approach:  Dorsal  Location:  Foot   Location comment:  Right great toe MTP joint                      Medications:  20 mg triamcinolone 40 MG/ML; 0.5 mL lidocaine 1 %        Outcome:  Tolerated well, no immediate complications  Procedure discussed: discussed risks, benefits, and alternatives    Consent Given by:  Patient  Timeout: timeout called immediately prior to procedure    Prep: patient was prepped and draped in usual sterile fashion       Ultrasound was used to ensure safe and accurate needle placement and injection. Ultrasound images of the procedure were permanently  stored.        Small Joint Injection/Arthrocentesis    Date/Time: 11/17/2022 2:01 PM  Performed by: Marlys Villagomez MD  Authorized by: Marlys Villagomez MD   Indications:  Pain  Needle Size:  25 G  Guidance: ultrasound     Approach:  Dorsal     Location comment:  Right 3rd TMT joint                      Medications:  20 mg triamcinolone 40 MG/ML; 0.5 mL lidocaine 1 %        Outcome:  Tolerated well, no immediate complications  Procedure discussed: discussed risks, benefits, and alternatives    Consent Given by:  Patient  Timeout: timeout called immediately prior to procedure    Prep: patient was prepped and draped in usual sterile fashion       Ultrasound was used to ensure safe and accurate needle placement and injection. Ultrasound images of the procedure were permanently stored.      Small Joint Injection/Arthrocentesis    Date/Time: 11/17/2022 2:02 PM  Performed by: Marlys Villagomez MD  Authorized by: Marlys Villagomez MD   Indications:  Pain  Needle Size:  25 G  Guidance: ultrasound     Approach:  Dorsal     Location comment:  Right 4th TMT joint                      Medications:  20 mg triamcinolone 40 MG/ML; 0.5 mL lidocaine 1 %        Outcome:  Tolerated well, no immediate complications  Procedure discussed: discussed risks, benefits, and alternatives    Consent Given by:  Patient  Timeout: timeout called immediately prior to procedure    Prep: patient was prepped and draped in usual sterile fashion       Ultrasound was used to ensure safe and accurate needle placement and injection. Ultrasound images of the procedure were permanently stored.        She noted improvement in the great toe MTP joint pain after injection, but did not notice appreciable improvement in midfoot pain immediately after injection.    Marlys Rich MD, [facility]          Again, thank you for allowing me to participate in the care of your patient.         Sincerely,        Marlys Rich MD

## 2022-11-17 NOTE — PROGRESS NOTES
ASSESSMENT & PLAN    1. Chronic foot pain, right    2. Arthritis, midfoot    3. Hallux limitus, right      Ultrasound-guided cortisone injections of the right 3rd tarsometatarsal joint, right 4th tarsometatarsal joint and left great toe metatarsophalangeal joints were performed in clinic today at the request of Dr. Campo without complication. Please see post injection instructions below.    Return to clinic for follow up with Dr. Campo if symptoms do not improve after 2 weeks.     You may call our direct clinic number ([phone]) at any time with questions or concerns.    Marlys Rich MD, [facility]    -----    SUBJECTIVE:  Triny Florian is a 52 year old female who is seen for ultrasound-guided right 3rd and 4th TMT and great toe MTP joint cortisone injections at the request of Dr. Campo.    Small Joint Injection/Arthrocentesis    Date/Time: 11/17/2022 1:31 PM  Performed by: Marlys Villagomez MD  Authorized by: Marlys Villagomez MD   Indications:  Pain  Needle Size:  25 G  Guidance: ultrasound     Approach:  Dorsal  Location:  Foot   Location comment:  Right great toe MTP joint                      Medications:  20 mg triamcinolone 40 MG/ML; 0.5 mL lidocaine 1 %        Outcome:  Tolerated well, no immediate complications  Procedure discussed: discussed risks, benefits, and alternatives    Consent Given by:  Patient  Timeout: timeout called immediately prior to procedure    Prep: patient was prepped and draped in usual sterile fashion       Ultrasound was used to ensure safe and accurate needle placement and injection. Ultrasound images of the procedure were permanently stored.        Small Joint Injection/Arthrocentesis    Date/Time: 11/17/2022 2:01 PM  Performed by: Marlys Villagomez MD  Authorized by: Marlys Villagomez MD   Indications:  Pain  Needle Size:  25 G  Guidance: ultrasound     Approach:  Dorsal     Location comment:   Right 3rd TMT joint                      Medications:  20 mg triamcinolone 40 MG/ML; 0.5 mL lidocaine 1 %        Outcome:  Tolerated well, no immediate complications  Procedure discussed: discussed risks, benefits, and alternatives    Consent Given by:  Patient  Timeout: timeout called immediately prior to procedure    Prep: patient was prepped and draped in usual sterile fashion       Ultrasound was used to ensure safe and accurate needle placement and injection. Ultrasound images of the procedure were permanently stored.      Small Joint Injection/Arthrocentesis    Date/Time: 11/17/2022 2:02 PM  Performed by: Marlys Villagomez MD  Authorized by: Marlys Villagomez MD   Indications:  Pain  Needle Size:  25 G  Guidance: ultrasound     Approach:  Dorsal     Location comment:  Right 4th TMT joint                      Medications:  20 mg triamcinolone 40 MG/ML; 0.5 mL lidocaine 1 %        Outcome:  Tolerated well, no immediate complications  Procedure discussed: discussed risks, benefits, and alternatives    Consent Given by:  Patient  Timeout: timeout called immediately prior to procedure    Prep: patient was prepped and draped in usual sterile fashion       Ultrasound was used to ensure safe and accurate needle placement and injection. Ultrasound images of the procedure were permanently stored.        She noted improvement in the great toe MTP joint pain after injection, but did not notice appreciable improvement in midfoot pain immediately after injection.    Marlys Rich MD, [facility]

## 2022-11-18 ENCOUNTER — MYC MEDICAL ADVICE (OUTPATIENT)
Dept: FAMILY MEDICINE | Facility: CLINIC | Age: 52
End: 2022-11-18

## 2022-11-18 RX ORDER — ESCITALOPRAM OXALATE 10 MG/1
10 TABLET ORAL DAILY
Qty: 30 TABLET | Refills: 1 | Status: SHIPPED | OUTPATIENT
Start: 2022-11-18 | End: 2022-12-13

## 2022-11-18 ASSESSMENT — PATIENT HEALTH QUESTIONNAIRE - PHQ9: SUM OF ALL RESPONSES TO PHQ QUESTIONS 1-9: 15

## 2022-11-18 NOTE — PATIENT INSTRUCTIONS
Using Antidepressants  Depression is a mood disorder that affects the way you think and feel. The most common symptom is a feeling of deep sadness. This feeling doesn't go away or get better on its own. But most types of depression can be helped with therapy and antidepressant medicines. (Note: This covers antidepressant use in adults only.)     What do antidepressants do?  Antidepressants restore the balance of certain chemicals in your brain to help ease your depression. You will likely feel better in 4 to 6 weeks. But you may continue taking antidepressants for a year or more to keep your symptoms from coming back. Some people with depression need to take antidepressants for life. There are several types of antidepressants. The main types are described below.   Selective serotonin reuptake inhibitors (SSRIs)  SSRIs are effective medicines for the treatment of depression. They tend to have fewer side effects than other antidepressants. Possible side effects include anxiety, trouble sleeping, nausea, diarrhea, sexual dysfunction, and headaches. In rare cases, they may make you more depressed. SSRIs shouldn t be mixed with certain other medicines. Talk with your healthcare provider about all the medicines, herbs, and supplements you are taking.   Tricyclic antidepressants  Tricyclics help severe or long-term depression. They have been used for many years with good results. Possible side effects include blurred vision, dry mouth, and constipation.   Monoamine oxidase inhibitors (MAOIs)  If you don t respond to tricyclics or SSRIs, your healthcare provider may prescribe MAOIs. These medicines can be very effective. But people taking MAOIs must stay away from certain foods and medicines. Your healthcare provider can tell you more.   Lithium  If you have bipolar disorder, you may take a medicine called lithium. This medicine helps even out your mood. Possible side effects are weight gain, trembling, loose stool, and  nausea. Lithium is also used:     For people who have unipolar depression and have not responded to other antidepressants    For people who have a sudden (acute) episode of unipolar depression    As a maintenance medicine to prevent unipolar depression from happening again  Things to stay away from if you are taking MAOIs   If you are taking MAOIs, don t have any of the following:     Beans    Aged cheese    Chocolate    Red wine    Most cold medicines    Certain medicines (ask your healthcare provider)  To reduce the risk of lithium poisoning  You can reduce the risk of lithium poisoning by following this advice:    Take only the prescribed amount of lithium. If your depressive symptoms get worse, contact your healthcare provider. Never increase or decrease your medicine on your own.    Drink plenty of fluids other than coffee, tea, and soda.    Limit salt in your diet.    Before using other prescribed medicines or over-the-counter medicines, check with your pharmacist. This is to be sure the medicines won t interact with the lithium.    Never share your medicines or use another person's medicines, even if it is the same medicine and dose.    Keep follow-up appointments.    Have your lithium blood level checked as advised. You will need blood work more often when symptoms are not under control.  If you have side effects  The side effects of antidepressants are usually mild. But if you have troubling side effects, call your healthcare provider. Changing the dosage or type of medicine may help. Never stop taking medicines on your own.   Martha last reviewed this educational content on 9/1/2019 2000-2021 [employer]. All rights reserved. This information is not intended as a substitute for professional medical care. Always follow your healthcare professional's instructions.          Depression: Tips to Help Yourself    As your healthcare providers help treat your depression, you can also help  yourself. Keep in mind that your illness affects you emotionally, physically, mentally, and socially. So full recovery will take time. Take care of your body and your soul, and be patient with yourself as you get better.  Self-care    Educate yourself. Read about treatment and medicine options. If you have the energy, attend local conferences or support groups. Keep a list of useful websites and helpful books and use them as needed. This illness is not your fault. Don t blame yourself for your depression.    Manage early symptoms. If you notice symptoms returning, experience triggers, or identify other factors that may lead to a depressive episode, get help as soon as possible. Ask trusted friends and family to monitor your behavior and let you know if they see anything of concern.    Work with your provider. Find a provider you can trust. Communicate honestly with that person and share information on your treatment for depression and your reaction to medicines.    Be prepared for a crisis. Know what to do if you experience a crisis. Keep the phone number of a crisis hotline and know the location of your community's urgent care centers and the closest emergency department.    Hold off on big decisions. Depression can cloud your judgment. So wait until you feel better before making major life decisions, such as changing jobs, moving, or getting  or .    Be patient. Recovering from depression is a process. Don t be discouraged if it takes some time to feel better.    Keep it simple. Depression saps your energy and concentration. So you won t be able to do all the things you used to do. Set small goals and do what you can.    Be with others. Don t isolate yourself--you ll only feel worse. Try to be with other people. And take part in fun activities when you can. Go to a movie, ballgame, Jewish service, or social event. Talk openly with people you can trust. And accept help when it s offered.    Take  care of your body  People with depression often lose the desire to take care of themselves. That only makes their problems worse. During treatment and afterward, make a point to:    Exercise. It s a great way to take care of your body. And studies have shown that exercise helps fight depression. Aim for 30 minutes of moderate activity a day. Walking in small blocks of time (5-10 minutes) is a good way to start, but anything that gets you moving (gardening, house cleaning) counts.    Don't use drugs and alcohol. These may ease the pain in the short term. But they ll only make your problems worse in the long run.    Get relief from stress. Ask your healthcare provider for relaxation exercises and techniques to help relieve stress. Consider activities like meditation, yoga, or Hesham Chi.    Eat right. A balanced and healthy diet helps keep your body healthy.    Get adequate sleep. Aim for 8 hours per night. Too much or too little sleep can cause other physical and emotional problems.  [employer] last reviewed this educational content on 12/1/2019 2000-2021 [employer]. All rights reserved. This information is not intended as a substitute for professional medical care. Always follow your healthcare professional's instructions.

## 2022-11-18 NOTE — TELEPHONE ENCOUNTER
Provider E-Visit time total (minutes): 12    PHQ 11/17/2022   PHQ-9 Total Score 15   Q9: Thoughts of better off dead/self-harm past 2 weeks Not at all

## 2022-12-10 ENCOUNTER — OFFICE VISIT (OUTPATIENT)
Dept: URGENT CARE | Facility: URGENT CARE | Age: 52
End: 2022-12-10
Payer: COMMERCIAL

## 2022-12-10 ENCOUNTER — E-VISIT (OUTPATIENT)
Dept: FAMILY MEDICINE | Facility: CLINIC | Age: 52
End: 2022-12-10
Payer: COMMERCIAL

## 2022-12-10 VITALS
OXYGEN SATURATION: 98 % | WEIGHT: 152 LBS | SYSTOLIC BLOOD PRESSURE: 106 MMHG | BODY MASS INDEX: 22.78 KG/M2 | HEART RATE: 66 BPM | TEMPERATURE: 97.3 F | DIASTOLIC BLOOD PRESSURE: 73 MMHG

## 2022-12-10 DIAGNOSIS — N30.01 ACUTE CYSTITIS WITH HEMATURIA: Primary | ICD-10-CM

## 2022-12-10 DIAGNOSIS — F33.0 MAJOR DEPRESSIVE DISORDER, RECURRENT EPISODE, MILD (H): ICD-10-CM

## 2022-12-10 DIAGNOSIS — R30.0 DYSURIA: ICD-10-CM

## 2022-12-10 LAB
ALBUMIN UR-MCNC: 100 MG/DL
APPEARANCE UR: CLEAR
BACTERIA #/AREA URNS HPF: ABNORMAL /HPF
BILIRUB UR QL STRIP: ABNORMAL
COLOR UR AUTO: YELLOW
GLUCOSE UR STRIP-MCNC: NEGATIVE MG/DL
HGB UR QL STRIP: ABNORMAL
KETONES UR STRIP-MCNC: 15 MG/DL
LEUKOCYTE ESTERASE UR QL STRIP: ABNORMAL
NITRATE UR QL: POSITIVE
PH UR STRIP: 6.5 [PH] (ref 5–7)
RBC #/AREA URNS AUTO: ABNORMAL /HPF
SP GR UR STRIP: >=1.03 (ref 1–1.03)
SQUAMOUS #/AREA URNS AUTO: ABNORMAL /LPF
UROBILINOGEN UR STRIP-ACNC: 1 E.U./DL
WBC #/AREA URNS AUTO: ABNORMAL /HPF

## 2022-12-10 PROCEDURE — 87086 URINE CULTURE/COLONY COUNT: CPT | Performed by: NURSE PRACTITIONER

## 2022-12-10 PROCEDURE — 81001 URINALYSIS AUTO W/SCOPE: CPT

## 2022-12-10 PROCEDURE — 87186 SC STD MICRODIL/AGAR DIL: CPT | Performed by: NURSE PRACTITIONER

## 2022-12-10 PROCEDURE — 99214 OFFICE O/P EST MOD 30 MIN: CPT | Performed by: NURSE PRACTITIONER

## 2022-12-10 PROCEDURE — 99421 OL DIG E/M SVC 5-10 MIN: CPT | Performed by: FAMILY MEDICINE

## 2022-12-10 RX ORDER — NITROFURANTOIN 25; 75 MG/1; MG/1
100 CAPSULE ORAL 2 TIMES DAILY
Qty: 10 CAPSULE | Refills: 0 | Status: SHIPPED | OUTPATIENT
Start: 2022-12-10 | End: 2022-12-15

## 2022-12-10 ASSESSMENT — PATIENT HEALTH QUESTIONNAIRE - PHQ9
10. IF YOU CHECKED OFF ANY PROBLEMS, HOW DIFFICULT HAVE THESE PROBLEMS MADE IT FOR YOU TO DO YOUR WORK, TAKE CARE OF THINGS AT HOME, OR GET ALONG WITH OTHER PEOPLE: NOT DIFFICULT AT ALL
SUM OF ALL RESPONSES TO PHQ QUESTIONS 1-9: 7
SUM OF ALL RESPONSES TO PHQ QUESTIONS 1-9: 7

## 2022-12-10 NOTE — PROGRESS NOTES
Chief Complaint   Patient presents with     UTI     Last night          ICD-10-CM    1. Acute cystitis with hematuria  N30.01 nitroFURantoin macrocrystal-monohydrate (MACROBID) 100 MG capsule      2. Dysuria  R30.0 UA macro with reflex to Microscopic and Culture - Clinc Collect     Urine Microscopic Exam     Urine Culture        Push fluids, take antibiotics until completely gone, she is instructed to go to the emergency room if she develops fever, chills, low back pain or vomiting.  May take Azo for dysuria.      Results for orders placed or performed in visit on 12/10/22 (from the past 24 hour(s))   UA macro with reflex to Microscopic and Culture - Clinc Collect    Specimen: Urine, Clean Catch   Result Value Ref Range    Color Urine Yellow Colorless, Straw, Light Yellow, Yellow    Appearance Urine Clear Clear    Glucose Urine Negative Negative mg/dL    Bilirubin Urine Large (A) Negative    Ketones Urine 15 (A) Negative mg/dL    Specific Gravity Urine >=1.030 1.003 - 1.035    Blood Urine Large (A) Negative    pH Urine 6.5 5.0 - 7.0    Protein Albumin Urine 100 (A) Negative mg/dL    Urobilinogen Urine 1.0 0.2, 1.0 E.U./dL    Nitrite Urine Positive (A) Negative    Leukocyte Esterase Urine Large (A) Negative   Urine Microscopic Exam   Result Value Ref Range    Bacteria Urine Moderate (A) None Seen /HPF    RBC Urine 25-50 (A) 0-2 /HPF /HPF    WBC Urine  (A) 0-5 /HPF /HPF    Squamous Epithelials Urine Few (A) None Seen /LPF       Subjective     Triny Florian is an 52 year old female who presents to clinic today for dysuria for 1 day.  Denies fever, chills, low back pain, nausea or vomiting.  She has only had 1 previous UTI in her entire life.        Objective    /73   Pulse 66   Temp 97.3  F (36.3  C) (Tympanic)   Wt 68.9 kg (152 lb)   SpO2 98%   BMI 22.78 kg/m    Nurses notes and VS have been reviewed.    Physical Exam       GENERAL APPEARANCE: healthy appearing, alert     ABDOMEN:  soft, nontender,  no HSM or masses and bowel sounds normal, no CVA tenderness     MS: extremities normal- no gross deformities noted; normal muscle tone.     SKIN: no suspicious lesions or rashes     PSYCH: normal thought process; no significant mood disturbance    Patient Instructions   PREVENTION OF URINARY TRACT INFECTION    AVOID CHEMICAL IRRITTANTS: bath gels,  Perfumed products,  Deoderant pads or tampons, douching    CLOTHING that increases moisture and bacterial growth:  Nylon, lycra, pantihose, pantiliners     AVOID: tight clothing and thongs    ACIDIFY URINE: cranberry tablets instead of cranberry juice (with excess sugar) to acidify urine and decrease bacterial growth.     URINATE after intercourse    FLUIDS: 6-8 glasses water per day     May try Azo for burning      RODRIGO Oneil, CNP  [facility] Provider    The use of Dragon/[employer] dictation services may have been used to construct the content in this note; any grammatical or spelling errors are non-intentional. Please contact the author of this note directly if you are in need of any clarification.

## 2022-12-10 NOTE — PATIENT INSTRUCTIONS
PREVENTION OF URINARY TRACT INFECTION    AVOID CHEMICAL IRRITTANTS: bath gels,  Perfumed products,  Deoderant pads or tampons, douching    CLOTHING that increases moisture and bacterial growth:  Nylon, lycra, pantihose, pantiliners     AVOID: tight clothing and thongs    ACIDIFY URINE: cranberry tablets instead of cranberry juice (with excess sugar) to acidify urine and decrease bacterial growth.     URINATE after intercourse    FLUIDS: 6-8 glasses water per day     May try Azo for burning

## 2022-12-11 LAB — BACTERIA UR CULT: ABNORMAL

## 2022-12-11 ASSESSMENT — PATIENT HEALTH QUESTIONNAIRE - PHQ9: SUM OF ALL RESPONSES TO PHQ QUESTIONS 1-9: 7

## 2022-12-13 RX ORDER — ESCITALOPRAM OXALATE 10 MG/1
10 TABLET ORAL DAILY
Qty: 90 TABLET | Refills: 1 | Status: SHIPPED | OUTPATIENT
Start: 2022-12-13 | End: 2023-08-30

## 2022-12-13 RX ORDER — ESCITALOPRAM OXALATE 10 MG/1
20 TABLET ORAL DAILY
Qty: 90 TABLET | Refills: 1 | Status: SHIPPED | OUTPATIENT
Start: 2022-12-13 | End: 2022-12-13

## 2022-12-19 NOTE — TELEPHONE ENCOUNTER
Writer responded via [employer].    Avis Nesbitt, FABION RN  [facility]

## 2023-01-10 DIAGNOSIS — F33.0 MAJOR DEPRESSIVE DISORDER, RECURRENT EPISODE, MILD (H): ICD-10-CM

## 2023-01-12 DIAGNOSIS — F33.0 MAJOR DEPRESSIVE DISORDER, RECURRENT EPISODE, MILD (H): ICD-10-CM

## 2023-01-13 RX ORDER — ESCITALOPRAM OXALATE 10 MG/1
TABLET ORAL
Qty: 30 TABLET | Refills: 1 | OUTPATIENT
Start: 2023-01-13

## 2023-01-16 RX ORDER — ESCITALOPRAM OXALATE 10 MG/1
10 TABLET ORAL DAILY
Qty: 90 TABLET | Refills: 1 | OUTPATIENT
Start: 2023-01-16

## 2023-01-16 NOTE — TELEPHONE ENCOUNTER
Message sent to pharmacy - Refusal reason: Duplicate (ORDER SENT 12/13/22 FOR 6 MO SUPPLY TO REQUESTING [facility].).  Leanne LENTZ

## 2023-02-15 ENCOUNTER — MYC MEDICAL ADVICE (OUTPATIENT)
Dept: FAMILY MEDICINE | Facility: CLINIC | Age: 53
End: 2023-02-15
Payer: COMMERCIAL

## 2023-02-15 DIAGNOSIS — B00.9 RECURRENT HERPES SIMPLEX: ICD-10-CM

## 2023-02-15 DIAGNOSIS — E06.3 HYPOTHYROIDISM DUE TO HASHIMOTO'S THYROIDITIS: ICD-10-CM

## 2023-02-15 DIAGNOSIS — E27.1 AUTOIMMUNE ADDISON'S DISEASE (H): ICD-10-CM

## 2023-02-22 RX ORDER — LEVOTHYROXINE SODIUM 175 UG/1
175 TABLET ORAL DAILY
Qty: 90 TABLET | Refills: 0 | Status: SHIPPED | OUTPATIENT
Start: 2023-02-22 | End: 2023-08-04

## 2023-02-22 RX ORDER — VALACYCLOVIR HYDROCHLORIDE 1 G/1
1000 TABLET, FILM COATED ORAL 2 TIMES DAILY
Qty: 60 TABLET | Refills: 1 | Status: SHIPPED | OUTPATIENT
Start: 2023-02-22 | End: 2023-08-30

## 2023-02-22 RX ORDER — PREDNISONE 5 MG/1
TABLET ORAL
Qty: 135 TABLET | Refills: 0 | Status: SHIPPED | OUTPATIENT
Start: 2023-02-22 | End: 2023-08-30

## 2023-02-22 NOTE — TELEPHONE ENCOUNTER
Dr. Mosher: future appt is 5/24/23.  Valtrex due, but would not need the levothyroxine or prednisone for another two months.  Defer to your discretion.             I could not get a physical appointment until May.  Could you send in RX refills for me that would last until then?  Especially prednisone, levothyroxine, and valtrex.  I'll need these before may.    [facility]    SURAJ Taylor RN  [facility]

## 2023-05-04 ENCOUNTER — MYC MEDICAL ADVICE (OUTPATIENT)
Dept: PODIATRY | Facility: CLINIC | Age: 53
End: 2023-05-04
Payer: COMMERCIAL

## 2023-05-04 DIAGNOSIS — M79.671 CHRONIC FOOT PAIN, RIGHT: Primary | ICD-10-CM

## 2023-05-04 DIAGNOSIS — G89.29 CHRONIC FOOT PAIN, RIGHT: Primary | ICD-10-CM

## 2023-05-04 DIAGNOSIS — M19.079 ARTHRITIS, MIDFOOT: ICD-10-CM

## 2023-05-04 DIAGNOSIS — M20.5X1 HALLUX LIMITUS, RIGHT: ICD-10-CM

## 2023-05-04 NOTE — TELEPHONE ENCOUNTER
Upon chart review, patient was last seen by Dr. Campo on 9/28/22 and had MRI right foot on 10/11/22. Dr. Campo had placed referrals for patient to have xray / fluoro guided injections, however patient's insurance would only cover 85% of the procedure per [employer] message on 10/28/22. Dr. Campo then placed referral for US guided injections with sports medicine.    Patient had US guided cortisone injections for right great toe MTP joint, right 3rd TMT joint and right 4th TMT joint on 11/17/22 with Dr. Villeda.    Responded to patient's [employer] message to discuss further.    Mita Powers MBA, ATC

## 2023-05-04 NOTE — TELEPHONE ENCOUNTER
Please see patient's mychart message and message below and advise. Ortho  referral pending for repeat US guided right foot injections with sports med (Drs. Yeo, Jason, Jayesh or Maira).    Mita Powers MBA, ATC

## 2023-05-10 ENCOUNTER — ANCILLARY PROCEDURE (OUTPATIENT)
Dept: GENERAL RADIOLOGY | Facility: CLINIC | Age: 53
End: 2023-05-10
Attending: PODIATRIST
Payer: COMMERCIAL

## 2023-05-10 ENCOUNTER — OFFICE VISIT (OUTPATIENT)
Dept: PODIATRY | Facility: CLINIC | Age: 53
End: 2023-05-10
Payer: COMMERCIAL

## 2023-05-10 VITALS — DIASTOLIC BLOOD PRESSURE: 74 MMHG | SYSTOLIC BLOOD PRESSURE: 106 MMHG

## 2023-05-10 DIAGNOSIS — M79.672 CHRONIC FOOT PAIN, LEFT: Primary | ICD-10-CM

## 2023-05-10 DIAGNOSIS — M19.071 ARTHRITIS OF FIRST METATARSOPHALANGEAL (MTP) JOINT OF RIGHT FOOT: ICD-10-CM

## 2023-05-10 DIAGNOSIS — G89.29 CHRONIC FOOT PAIN, LEFT: Primary | ICD-10-CM

## 2023-05-10 DIAGNOSIS — M19.079 ARTHRITIS OF MIDFOOT: ICD-10-CM

## 2023-05-10 PROCEDURE — 99213 OFFICE O/P EST LOW 20 MIN: CPT | Performed by: PODIATRIST

## 2023-05-10 PROCEDURE — 73630 X-RAY EXAM OF FOOT: CPT | Mod: TC | Performed by: RADIOLOGY

## 2023-05-10 NOTE — PROGRESS NOTES
ASSESSMENT:  Encounter Diagnoses   Name Primary?     Chronic foot pain, left Yes     Arthritis of first metatarsophalangeal (MTP) joint of right foot      Arthritis of bilaeral midfoot      MEDICAL DECISION MAKING:  I personally reviewed the left foot x-ray images.  No advanced degenerative changes seen in the midfoot.    Nonetheless, midfoot arthritis involving the third and fourth tarsometatarsal tarsal joints is considered, given the symptoms are similar to her right foot.  Right foot pain was relieved with ultrasound-guided steroid injections.    I will place a referral to sports medicine asking that they consider also injecting the joints of the left foot, as well as the right foot.    Previous right foot injections included the first metatarsophalangeal joint, third tarsometatarsal joint, and fourth tarsometatarsal joint.    We will request that injections into the left third tarsometatarsal joint and fourth tarsometatarsal joint be considered.    She is to continue with her excellent shoes and custom orthoses.  We discussed the chance that someday she might need arthrodesis of involved joints.    Disclaimer: This note consists of symbols derived from keyboarding, dictation and/or voice recognition software. As a result, there may be errors in the script that have gone undetected. Please consider this when interpreting information found in this chart.    Caleb Campo DPM, FACPATEL, [facility] Department of Podiatry/Foot & Ankle Surgery      ____________________________________________________________________    HPI:       Triny presents today hoping to have image guided injections repeated in the right foot and also now according some similar symptoms in the left foot.  She had the injections done by Dr. Villeda of sports medicine back in November.  This provider is no longer with [facility].  She has an upcoming appointment with sports medicine on 5/19/2023, Dr. Gomez for similar injections.  She  would like to have injections in the left foot if possible.  Her midfoot pain is similar.    I last evaluated her 9/28/2022.  An MRI showed advanced degenerative changes of the right first metatarsophalangeal joint as well as degenerative changes of the fourth tarsometatarsal joint  She enjoyed pain relief for 6 months.  She also continues to wear quality shoes with custom orthoses.  She leads a very active lifestyle.    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 central line placement when being diagnosed     Hepatitis 11/10/2006     History of blood transfusion 1998     Other diseases of lung, not elsewhere classified 3/21/2007    Overview:  pulm nodule: ANNAMARIA suprahilar 7 mm non calcified noted on CXR after TB exposure at work  CT scheduled-     Painful scar 5/23/2012    where feeding tube is     Thyroid disease 1995    hypothyroid   *  *  Past Surgical History:   Procedure Laterality Date     CHOLECYSTECTOMY       COLONOSCOPY  2018     GYN SURGERY       ORTHOPEDIC SURGERY  2016    toe surgery   *  *  Current Outpatient Medications   Medication Sig Dispense Refill     levothyroxine (SYNTHROID/LEVOTHROID) 175 MCG tablet Take 1 tablet (175 mcg) by mouth daily 90 tablet 0     predniSONE (DELTASONE) 5 MG tablet TAKE ONE TO ONE AND ONE-HALF TABLETS BY MOUTH DAILY 135 tablet 0     valACYclovir (VALTREX) 1000 mg tablet Take 1 tablet (1,000 mg) by mouth 2 times daily 60 tablet 1     escitalopram (LEXAPRO) 10 MG tablet Take 1 tablet (10 mg) by mouth daily 90 tablet 1     fludrocortisone (FLORINEF) 0.1 MG tablet TAKE ONE TO THREE TABLETS BY MOUTH DAILY AS NEEDED 270 tablet 3         EXAM:    Vitals: There were no vitals taken for this visit.  BMI: There is no height or weight on file to calculate BMI.    Vascular:  Pedal pulses are  palpable for both the DP and PT arteries.  CFT < 3 sec.  No edema.       Neuro: Light touch sensation is intact to the L4, L5, S1 distributions  No evidence of weakness, spasticity, or contracture in the lower extremities.      Derm: Normal texture and turgor.  No erythema, ecchymosis, or cyanosis.  No open lesions.      Musculoskeletal:    Lower extremity muscle strength is normal. No gross deformities.  Higher medial longitudinal arch.  No pain reproduced on palpatory exam of the left foot.      Previously noted, there is some pain on palpation over the right third and fourth tarsometatarsal joints.  Also with loading of the right forefoot, significant limitation of hallux dorsiflexion.     X-Ray Findings:  I personally reviewed the right foot images.  Please see comments above    10/11/2022 MRI RIGHT FOOT:     Impression:  1. Severe degenerative change of first metatarsal-phalangeal joint,  third and fourth tarsometatarsal joints.  2. Query stress reaction at the third and fourth metatarsal bases.  3.Approximately 5 mm in mediolateral dimension plantar fat effacement  at the first interspace, underlying small interdigital neuroma cannot  be excluded.     JUANY PRESSLEY

## 2023-05-10 NOTE — LETTER
5/10/2023         RE: Triny Florian  [address] [address]        Dear Colleague,    Thank you for referring your patient, Triny Florian, to the [facility]. Please see a copy of my visit note below.    ASSESSMENT:  Encounter Diagnoses   Name Primary?     Chronic foot pain, left Yes     Arthritis of first metatarsophalangeal (MTP) joint of right foot      Arthritis of bilaeral midfoot      MEDICAL DECISION MAKING:  I personally reviewed the left foot x-ray images.  No advanced degenerative changes seen in the midfoot.    Nonetheless, midfoot arthritis involving the third and fourth tarsometatarsal tarsal joints is considered, given the symptoms are similar to her right foot.  Right foot pain was relieved with ultrasound-guided steroid injections.    I will place a referral to sports medicine asking that they consider also injecting the joints of the left foot, as well as the right foot.    Previous right foot injections included the first metatarsophalangeal joint, third tarsometatarsal joint, and fourth tarsometatarsal joint.    We will request that injections into the left third tarsometatarsal joint and fourth tarsometatarsal joint be considered.    She is to continue with her excellent shoes and custom orthoses.  We discussed the chance that someday she might need arthrodesis of involved joints.    Disclaimer: This note consists of symbols derived from keyboarding, dictation and/or voice recognition software. As a result, there may be errors in the script that have gone undetected. Please consider this when interpreting information found in this chart.    Caleb Campo, TYLER, FACFAS, MS    [place] Department of Podiatry/Foot & Ankle Surgery      ____________________________________________________________________    HPI:       Triny presents today hoping to have image guided injections repeated in the right foot and also now according some similar  symptoms in the left foot.  She had the injections done by Dr. Villeda of sports medicine back in November.  This provider is no longer with [facility].  She has an upcoming appointment with sports medicine on 5/19/2023, Dr. Gomez for similar injections.  She would like to have injections in the left foot if possible.  Her midfoot pain is similar.    I last evaluated her 9/28/2022.  An MRI showed advanced degenerative changes of the right first metatarsophalangeal joint as well as degenerative changes of the fourth tarsometatarsal joint  She enjoyed pain relief for 6 months.  She also continues to wear quality shoes with custom orthoses.  She leads a very active lifestyle.    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 central line placement when being diagnosed     Hepatitis 11/10/2006     History of blood transfusion 1998     Other diseases of lung, not elsewhere classified 3/21/2007    Overview:  pulm nodule: ANNAMARIA suprahilar 7 mm non calcified noted on CXR after TB exposure at work  CT scheduled-     Painful scar 5/23/2012    where feeding tube is     Thyroid disease 1995    hypothyroid   *  *  Past Surgical History:   Procedure Laterality Date     CHOLECYSTECTOMY       COLONOSCOPY  2018     GYN SURGERY       ORTHOPEDIC SURGERY  2016    toe surgery   *  *  Current Outpatient Medications   Medication Sig Dispense Refill     levothyroxine (SYNTHROID/LEVOTHROID) 175 MCG tablet Take 1 tablet (175 mcg) by mouth daily 90 tablet 0     predniSONE (DELTASONE) 5 MG tablet TAKE ONE TO ONE AND ONE-HALF TABLETS BY MOUTH DAILY 135 tablet 0     valACYclovir (VALTREX) 1000 mg tablet Take 1 tablet (1,000 mg) by mouth 2 times daily 60 tablet 1     escitalopram (LEXAPRO) 10 MG tablet Take 1 tablet (10 mg) by mouth daily 90 tablet 1      fludrocortisone (FLORINEF) 0.1 MG tablet TAKE ONE TO THREE TABLETS BY MOUTH DAILY AS NEEDED 270 tablet 3         EXAM:    Vitals: There were no vitals taken for this visit.  BMI: There is no height or weight on file to calculate BMI.    Vascular:  Pedal pulses are palpable for both the DP and PT arteries.  CFT < 3 sec.  No edema.       Neuro: Light touch sensation is intact to the L4, L5, S1 distributions  No evidence of weakness, spasticity, or contracture in the lower extremities.      Derm: Normal texture and turgor.  No erythema, ecchymosis, or cyanosis.  No open lesions.      Musculoskeletal:    Lower extremity muscle strength is normal. No gross deformities.  Higher medial longitudinal arch.  No pain reproduced on palpatory exam of the left foot.      Previously noted, there is some pain on palpation over the right third and fourth tarsometatarsal joints.  Also with loading of the right forefoot, significant limitation of hallux dorsiflexion.     X-Ray Findings:  I personally reviewed the right foot images.  Please see comments above    10/11/2022 MRI RIGHT FOOT:     Impression:  1. Severe degenerative change of first metatarsal-phalangeal joint,  third and fourth tarsometatarsal joints.  2. Query stress reaction at the third and fourth metatarsal bases.  3.Approximately 5 mm in mediolateral dimension plantar fat effacement  at the first interspace, underlying small interdigital neuroma cannot  be excluded.     JUANY PRESSLEY       Again, thank you for allowing me to participate in the care of your patient.        Sincerely,        Caleb Campo DPM

## 2023-05-19 ENCOUNTER — OFFICE VISIT (OUTPATIENT)
Dept: ORTHOPEDICS | Facility: CLINIC | Age: 53
End: 2023-05-19
Payer: COMMERCIAL

## 2023-05-19 VITALS — BODY MASS INDEX: 22.78 KG/M2 | WEIGHT: 152 LBS

## 2023-05-19 DIAGNOSIS — M79.671 CHRONIC FOOT PAIN, RIGHT: ICD-10-CM

## 2023-05-19 DIAGNOSIS — G89.29 CHRONIC FOOT PAIN, RIGHT: ICD-10-CM

## 2023-05-19 DIAGNOSIS — M19.079 ARTHRITIS, MIDFOOT: ICD-10-CM

## 2023-05-19 PROCEDURE — 99207 PR DROP WITH A PROCEDURE: CPT | Performed by: FAMILY MEDICINE

## 2023-05-19 PROCEDURE — 20604 DRAIN/INJ JOINT/BURSA W/US: CPT | Mod: 50 | Performed by: FAMILY MEDICINE

## 2023-05-19 RX ORDER — METHYLPREDNISOLONE ACETATE 40 MG/ML
20 INJECTION, SUSPENSION INTRA-ARTICULAR; INTRALESIONAL; INTRAMUSCULAR; SOFT TISSUE
Status: DISCONTINUED | OUTPATIENT
Start: 2023-05-19 | End: 2024-06-05

## 2023-05-19 RX ORDER — LIDOCAINE HYDROCHLORIDE 10 MG/ML
0.5 INJECTION, SOLUTION EPIDURAL; INFILTRATION; INTRACAUDAL; PERINEURAL
Status: DISCONTINUED | OUTPATIENT
Start: 2023-05-19 | End: 2024-06-05

## 2023-05-19 RX ADMIN — LIDOCAINE HYDROCHLORIDE 0.5 ML: 10 INJECTION, SOLUTION EPIDURAL; INFILTRATION; INTRACAUDAL; PERINEURAL at 07:45

## 2023-05-19 RX ADMIN — LIDOCAINE HYDROCHLORIDE 0.5 ML: 10 INJECTION, SOLUTION EPIDURAL; INFILTRATION; INTRACAUDAL; PERINEURAL at 07:43

## 2023-05-19 RX ADMIN — LIDOCAINE HYDROCHLORIDE 0.5 ML: 10 INJECTION, SOLUTION EPIDURAL; INFILTRATION; INTRACAUDAL; PERINEURAL at 07:40

## 2023-05-19 RX ADMIN — METHYLPREDNISOLONE ACETATE 20 MG: 40 INJECTION, SUSPENSION INTRA-ARTICULAR; INTRALESIONAL; INTRAMUSCULAR; SOFT TISSUE at 07:43

## 2023-05-19 RX ADMIN — METHYLPREDNISOLONE ACETATE 20 MG: 40 INJECTION, SUSPENSION INTRA-ARTICULAR; INTRALESIONAL; INTRAMUSCULAR; SOFT TISSUE at 07:45

## 2023-05-19 RX ADMIN — LIDOCAINE HYDROCHLORIDE 0.5 ML: 10 INJECTION, SOLUTION EPIDURAL; INFILTRATION; INTRACAUDAL; PERINEURAL at 07:44

## 2023-05-19 RX ADMIN — METHYLPREDNISOLONE ACETATE 20 MG: 40 INJECTION, SUSPENSION INTRA-ARTICULAR; INTRALESIONAL; INTRAMUSCULAR; SOFT TISSUE at 07:44

## 2023-05-19 RX ADMIN — METHYLPREDNISOLONE ACETATE 20 MG: 40 INJECTION, SUSPENSION INTRA-ARTICULAR; INTRALESIONAL; INTRAMUSCULAR; SOFT TISSUE at 07:40

## 2023-05-19 NOTE — PROGRESS NOTES
PROCEDURE ENCOUNTER    [facility]  Orthopedics  Pako Gomez, DO  May 19, 2023     Name: Triny Florian  MRN: [id number]  Age: 53 year old  : 1970    Referring provider: Dr. Caleb Campo DPM  Diagnosis: Chronic pain of bilateral feet    First Metatarsophalangeal joint Injection- Ultrasound Guided    The patient was informed of the risks and the benefits of the procedure and a written consent was signed.    The patient s right and left TMT joints were prepped with chlorhexidine in sterile fashion.     Four separate syringes containing 20 mg of methylprednisolone suspension were drawn up into a 3 mL syringe with 0.5 mL of 1% lidocaine.    Ethyl chloride utilized prior to needle entry into joints.    Injection was performed using sterile technique.  Under ultrasound guidance a 1.5-inch 25-gauge needle was used to enter the fourth TMT joint of the right foot.  Needle placement was visualized and documented with ultrasound.  Needle placed in short axis to the probe.     Under ultrasound guidance a 1.5-inch 25-gauge needle was used to enter the third TMT joint of the right foot.  Needle placement was visualized and documented with ultrasound.  Needle placed in short axis to the probe.     Under ultrasound guidance a 1.5-inch 25-gauge needle was used to enter the third TMT joint of the left foot.  Needle placement was visualized and documented with ultrasound.  Needle placed in short axis to the probe.     Under ultrasound guidance a 1.5-inch 25-gauge needle was used to enter the fourth TMT joint of the left foot.  Needle placement was visualized and documented with ultrasound.  Needle placed in short axis to the probe.      Ultrasound visualization was necessary due to decreased joint space in the setting of osteoarthritis.  Images were permanently stored for the patient's record. There were no complications. The patient tolerated the procedure well. There was negligible bleeding.    The patient was instructed  to ice the toe upon leaving clinic and refrain from overuse over the next 3 days.     The patient was instructed to call or go to the emergency room with any unusual pain, swelling, redness, or if otherwise concerned.    Follow up for right 1st MTP joint injection.    Pako Gomez DO CAQSM  Primary Care Sports Medicine  [facility]    Small Joint Injection: Right 3rd TMT Joint Injection     Date/Time: 5/19/2023 7:40 AM    Performed by: Pako Gomez DO  Authorized by: Pako Gomez DO  Indications:  Pain  Needle Size:  25 G  Guidance: ultrasound     Approach:  Dorsal     Location comment:  Right   3rd TMT Joint                      Medications:  20 mg methylPREDNISolone 40 MG/ML; 0.5 mL lidocaine (PF) 1 %        Outcome:  Tolerated well, no immediate complications  Procedure discussed: discussed risks, benefits, and alternatives    Consent Given by:  Patient  Timeout: timeout called immediately prior to procedure    Prep: patient was prepped and draped in usual sterile fashion        Small Joint Injection: Left 3rd TMT Joint Injection     Date/Time: 5/19/2023 7:43 AM    Performed by: Pako Gomez DO  Authorized by: Pako Gomez DO  Indications:  Pain  Needle Size:  25 G  Guidance: ultrasound     Approach:  Dorsal     Location comment:  Left  3rd TMT Joint Injection                       Medications:  20 mg methylPREDNISolone 40 MG/ML; 0.5 mL lidocaine (PF) 1 %        Outcome:  Tolerated well, no immediate complications  Procedure discussed: discussed risks, benefits, and alternatives    Consent Given by:  Patient  Timeout: timeout called immediately prior to procedure    Prep: patient was prepped and draped in usual sterile fashion      Small Joint Injection: Right 4th TMT Joint Injection     Date/Time: 5/19/2023 7:44 AM    Performed by: Pako Gomez DO  Authorized by: Pako Gomez DO  Indications:  Pain  Needle Size:  25 G  Guidance: ultrasound     Approach:  Dorsal      Location comment:  Right 4th TMT Joint Injection                       Medications:  20 mg methylPREDNISolone 40 MG/ML; 0.5 mL lidocaine (PF) 1 %        Outcome:  Tolerated well, no immediate complications  Procedure discussed: discussed risks, benefits, and alternatives    Consent Given by:  Patient  Timeout: timeout called immediately prior to procedure    Prep: patient was prepped and draped in usual sterile fashion      Small Joint Injection: Left 4th TMT Joint Injection     Date/Time: 5/19/2023 7:45 AM    Performed by: Pako Gomez DO  Authorized by: Pako Gomez DO  Indications:  Pain  Needle Size:  25 G  Guidance: ultrasound     Approach:  Dorsal     Location comment:  Left 4th TMT Joint Injection                       Medications:  20 mg methylPREDNISolone 40 MG/ML; 0.5 mL lidocaine (PF) 1 %        Outcome:  Tolerated well, no immediate complications  Procedure discussed: discussed risks, benefits, and alternatives    Consent Given by:  Patient  Timeout: timeout called immediately prior to procedure    Prep: patient was prepped and draped in usual sterile fashion

## 2023-05-19 NOTE — NURSING NOTE
[address] [address]  Dept: [phone]  ______________________________________________________________________________    Patient: Triny Florian   : 1970   MRN: [id number]   May 19, 2023    INVASIVE PROCEDURE SAFETY CHECKLIST    Date: May 19, 2023   Procedure: Bilateral 3rd and 4th TMT joint injections with depo and USG  Patient Name: Triny Florian  MRN: [id number]  YOB: 1970    Action: Complete sections as appropriate. Any discrepancy results in a HARD COPY until resolved.     PRE PROCEDURE:  Patient ID verified with 2 identifiers (name and  or MRN): Yes  Procedure and site verified with patient/designee (when able): Yes  Accurate consent documentation in medical record: Yes  H&P (or appropriate assessment) documented in medical record: Yes  H&P must be up to 20 days prior to procedure and updates within 24 hours of procedure as applicable: NA  Relevant diagnostic and radiology test results appropriately labeled and displayed as applicable: NA  Procedure site(s) marked with provider initials: NA    TIMEOUT:  Time-Out performed immediately prior to starting procedure, including verbal and active participation of all team members addressing the following:Yes  * Correct patient identify  * Confirmed that the correct side and site are marked  * An accurate procedure consent form  * Agreement on the procedure to be done  * Correct patient position  * Relevant images and results are properly labeled and appropriately displayed  * The need to administer antibiotics or fluids for irrigation purposes during the procedure as applicable   * Safety precautions based on patient history or medication use    DURING PROCEDURE: Verification of correct person, site, and procedures any time the responsibility for care of the patient is transferred to another member of the care team.       Prior to injection, verified patient identity  using patient's name and date of birth.  Due to injection administration, patient instructed to remain in clinic for 15 minutes  afterwards, and to report any adverse reaction to me immediately.    Joint injection was performed.      Lido  Drug Amount Wasted:  Yes: 3 mg/ml   Vial/Syringe: Single dose vial  Expiration Date:  12/01/2026    Depo  Drug Amount Wasted:  None  Vial/Syringe: Single dose vial  Expiration Date: 05/01/2024    Khalida Aaron, ATC  May 19, 2023

## 2023-05-19 NOTE — LETTER
2023      RE: Triny Florian  [address] [address]     Dear Colleague,    Thank you for referring your patient, Triny Florian, to the [facility] [place]. Please see a copy of my visit note below.    PROCEDURE ENCOUNTER    [facility]  Orthopedics  Pako Gomez, DO  May 19, 2023     Name: Triny Florian  MRN: [id number]  Age: 53 year old  : 1970    Referring provider: Dr. Caleb Campo DPM  Diagnosis: Chronic pain of bilateral feet    First Metatarsophalangeal joint Injection- Ultrasound Guided    The patient was informed of the risks and the benefits of the procedure and a written consent was signed.    The patient s right and left TMT joints were prepped with chlorhexidine in sterile fashion.     Four separate syringes containing 20 mg of methylprednisolone suspension were drawn up into a 3 mL syringe with 0.5 mL of 1% lidocaine.    Ethyl chloride utilized prior to needle entry into joints.    Injection was performed using sterile technique.  Under ultrasound guidance a 1.5-inch 25-gauge needle was used to enter the fourth TMT joint of the right foot.  Needle placement was visualized and documented with ultrasound.  Needle placed in short axis to the probe.     Under ultrasound guidance a 1.5-inch 25-gauge needle was used to enter the third TMT joint of the right foot.  Needle placement was visualized and documented with ultrasound.  Needle placed in short axis to the probe.     Under ultrasound guidance a 1.5-inch 25-gauge needle was used to enter the third TMT joint of the left foot.  Needle placement was visualized and documented with ultrasound.  Needle placed in short axis to the probe.     Under ultrasound guidance a 1.5-inch 25-gauge needle was used to enter the fourth TMT joint of the left foot.  Needle placement was visualized and documented with ultrasound.  Needle placed in short axis to the probe.      Ultrasound visualization  was necessary due to decreased joint space in the setting of osteoarthritis.  Images were permanently stored for the patient's record. There were no complications. The patient tolerated the procedure well. There was negligible bleeding.    The patient was instructed to ice the toe upon leaving clinic and refrain from overuse over the next 3 days.     The patient was instructed to call or go to the emergency room with any unusual pain, swelling, redness, or if otherwise concerned.    Follow up for right 1st MTP joint injection.    Pako Gomez DO [facility]  Primary Care Sports Medicine  [facility]    Small Joint Injection: Right 3rd TMT Joint Injection     Date/Time: 5/19/2023 7:40 AM    Performed by: Pako Gomez DO  Authorized by: Pako Gomez DO  Indications:  Pain  Needle Size:  25 G  Guidance: ultrasound     Approach:  Dorsal     Location comment:  Right   3rd TMT Joint                      Medications:  20 mg methylPREDNISolone 40 MG/ML; 0.5 mL lidocaine (PF) 1 %        Outcome:  Tolerated well, no immediate complications  Procedure discussed: discussed risks, benefits, and alternatives    Consent Given by:  Patient  Timeout: timeout called immediately prior to procedure    Prep: patient was prepped and draped in usual sterile fashion        Small Joint Injection: Left 3rd TMT Joint Injection     Date/Time: 5/19/2023 7:43 AM    Performed by: Pako Gomez DO  Authorized by: Pako Gomez DO  Indications:  Pain  Needle Size:  25 G  Guidance: ultrasound     Approach:  Dorsal     Location comment:  Left  3rd TMT Joint Injection                       Medications:  20 mg methylPREDNISolone 40 MG/ML; 0.5 mL lidocaine (PF) 1 %        Outcome:  Tolerated well, no immediate complications  Procedure discussed: discussed risks, benefits, and alternatives    Consent Given by:  Patient  Timeout: timeout called immediately prior to procedure    Prep: patient was prepped and draped in usual  sterile fashion      Small Joint Injection: Right 4th TMT Joint Injection     Date/Time: 5/19/2023 7:44 AM    Performed by: Pako Gomez DO  Authorized by: Pako Gomez DO  Indications:  Pain  Needle Size:  25 G  Guidance: ultrasound     Approach:  Dorsal     Location comment:  Right 4th TMT Joint Injection                       Medications:  20 mg methylPREDNISolone 40 MG/ML; 0.5 mL lidocaine (PF) 1 %        Outcome:  Tolerated well, no immediate complications  Procedure discussed: discussed risks, benefits, and alternatives    Consent Given by:  Patient  Timeout: timeout called immediately prior to procedure    Prep: patient was prepped and draped in usual sterile fashion      Small Joint Injection: Left 4th TMT Joint Injection     Date/Time: 5/19/2023 7:45 AM    Performed by: Pako Gomez DO  Authorized by: Pako Gomez DO  Indications:  Pain  Needle Size:  25 G  Guidance: ultrasound     Approach:  Dorsal     Location comment:  Left 4th TMT Joint Injection                       Medications:  20 mg methylPREDNISolone 40 MG/ML; 0.5 mL lidocaine (PF) 1 %        Outcome:  Tolerated well, no immediate complications  Procedure discussed: discussed risks, benefits, and alternatives    Consent Given by:  Patient  Timeout: timeout called immediately prior to procedure    Prep: patient was prepped and draped in usual sterile fashion                Again, thank you for allowing me to participate in the care of your patient.      Sincerely,    Pako Gomez DO

## 2023-06-01 ENCOUNTER — HOSPITAL ENCOUNTER (OUTPATIENT)
Dept: MAMMOGRAPHY | Facility: CLINIC | Age: 53
Discharge: HOME | End: 2023-06-01
Attending: FAMILY MEDICINE | Admitting: FAMILY MEDICINE
Payer: COMMERCIAL

## 2023-06-01 DIAGNOSIS — Z12.31 VISIT FOR SCREENING MAMMOGRAM: ICD-10-CM

## 2023-06-01 PROCEDURE — 77067 SCR MAMMO BI INCL CAD: CPT

## 2023-06-30 ENCOUNTER — TRANSFERRED RECORDS (OUTPATIENT)
Dept: MULTI SPECIALTY CLINIC | Facility: CLINIC | Age: 53
End: 2023-06-30

## 2023-07-22 ENCOUNTER — HEALTH MAINTENANCE LETTER (OUTPATIENT)
Age: 53
End: 2023-07-22

## 2023-08-02 DIAGNOSIS — E06.3 HYPOTHYROIDISM DUE TO HASHIMOTO'S THYROIDITIS: ICD-10-CM

## 2023-08-04 RX ORDER — LEVOTHYROXINE SODIUM 175 UG/1
TABLET ORAL
Qty: 90 TABLET | Refills: 0 | Status: SHIPPED | OUTPATIENT
Start: 2023-08-04 | End: 2023-08-30

## 2023-08-04 NOTE — TELEPHONE ENCOUNTER
Routing refill request to provider for review/approval because:  -- Puja given x1   -- Labs out of range:  TSH  -- Labs not current:  TSH  -- Patient needs to be seen because it has been more than 1 year since last office visit.    Thyroid Protocol Failed      Normal TSH on file in past 12 months        Recent Labs   Lab Test 05/18/22  1556   TSH 0.17*       Last Written Prescription Date:  2/22/2023  Last Fill Quantity: 90,  # refills: 0   Last office visit: 5/18/2022 ; last virtual visit: 8/17/2022 with prescribing provider:  Dr. Mosher   Future Office Visit:   Next 5 appointments (look out 90 days)      Aug 30, 2023 11:00 AM  (Arrive by 10:40 AM)  Adult Preventative Visit with Mishel Mosher MD  [facility] ([facility] ) [address] [place] [address] [address]  [place] [address]  [phone]           FABIO CruzN RN  [facility]

## 2023-08-30 ENCOUNTER — OFFICE VISIT (OUTPATIENT)
Dept: FAMILY MEDICINE | Facility: CLINIC | Age: 53
End: 2023-08-30
Payer: COMMERCIAL

## 2023-08-30 VITALS
OXYGEN SATURATION: 98 % | RESPIRATION RATE: 15 BRPM | HEIGHT: 69 IN | WEIGHT: 152 LBS | SYSTOLIC BLOOD PRESSURE: 101 MMHG | DIASTOLIC BLOOD PRESSURE: 71 MMHG | TEMPERATURE: 96.9 F | BODY MASS INDEX: 22.51 KG/M2 | HEART RATE: 73 BPM

## 2023-08-30 DIAGNOSIS — Z12.4 CERVICAL CANCER SCREENING: ICD-10-CM

## 2023-08-30 DIAGNOSIS — E27.1 AUTOIMMUNE ADDISON'S DISEASE (H): ICD-10-CM

## 2023-08-30 DIAGNOSIS — Z00.00 ROUTINE GENERAL MEDICAL EXAMINATION AT A HEALTH CARE FACILITY: Primary | ICD-10-CM

## 2023-08-30 DIAGNOSIS — B00.9 RECURRENT HERPES SIMPLEX: ICD-10-CM

## 2023-08-30 DIAGNOSIS — E06.3 HYPOTHYROIDISM DUE TO HASHIMOTO'S THYROIDITIS: ICD-10-CM

## 2023-08-30 LAB
ANION GAP SERPL CALCULATED.3IONS-SCNC: 11 MMOL/L (ref 7–15)
BUN SERPL-MCNC: 12.6 MG/DL (ref 6–20)
CALCIUM SERPL-MCNC: 10.1 MG/DL (ref 8.6–10)
CHLORIDE SERPL-SCNC: 100 MMOL/L (ref 98–107)
CHOLEST SERPL-MCNC: 235 MG/DL
CREAT SERPL-MCNC: 0.94 MG/DL (ref 0.51–0.95)
DEPRECATED HCO3 PLAS-SCNC: 27 MMOL/L (ref 22–29)
GFR SERPL CREATININE-BSD FRML MDRD: 71 ML/MIN/1.73M2
GLUCOSE SERPL-MCNC: 113 MG/DL (ref 70–99)
HDLC SERPL-MCNC: 75 MG/DL
LDLC SERPL CALC-MCNC: 137 MG/DL
NONHDLC SERPL-MCNC: 160 MG/DL
POTASSIUM SERPL-SCNC: 4.7 MMOL/L (ref 3.4–5.3)
SODIUM SERPL-SCNC: 138 MMOL/L (ref 136–145)
T4 FREE SERPL-MCNC: 1.76 NG/DL (ref 0.9–1.7)
TRIGL SERPL-MCNC: 115 MG/DL
TSH SERPL DL<=0.005 MIU/L-ACNC: 0.15 UIU/ML (ref 0.3–4.2)

## 2023-08-30 PROCEDURE — 84439 ASSAY OF FREE THYROXINE: CPT | Performed by: FAMILY MEDICINE

## 2023-08-30 PROCEDURE — 99396 PREV VISIT EST AGE 40-64: CPT | Performed by: FAMILY MEDICINE

## 2023-08-30 PROCEDURE — 36415 COLL VENOUS BLD VENIPUNCTURE: CPT | Performed by: FAMILY MEDICINE

## 2023-08-30 PROCEDURE — 84443 ASSAY THYROID STIM HORMONE: CPT | Performed by: FAMILY MEDICINE

## 2023-08-30 PROCEDURE — 99213 OFFICE O/P EST LOW 20 MIN: CPT | Mod: 25 | Performed by: FAMILY MEDICINE

## 2023-08-30 PROCEDURE — 80061 LIPID PANEL: CPT | Performed by: FAMILY MEDICINE

## 2023-08-30 PROCEDURE — 80048 BASIC METABOLIC PNL TOTAL CA: CPT | Performed by: FAMILY MEDICINE

## 2023-08-30 RX ORDER — VALACYCLOVIR HYDROCHLORIDE 1 G/1
1000 TABLET, FILM COATED ORAL 2 TIMES DAILY
Qty: 60 TABLET | Refills: 1 | Status: SHIPPED | OUTPATIENT
Start: 2023-08-30 | End: 2024-08-07

## 2023-08-30 RX ORDER — FLUDROCORTISONE ACETATE 0.1 MG/1
TABLET ORAL
Qty: 270 TABLET | Refills: 4 | Status: SHIPPED | OUTPATIENT
Start: 2023-08-30 | End: 2024-08-07

## 2023-08-30 RX ORDER — PREDNISONE 5 MG/1
TABLET ORAL
Qty: 135 TABLET | Refills: 4 | Status: ON HOLD | OUTPATIENT
Start: 2023-08-30 | End: 2024-06-06

## 2023-08-30 RX ORDER — LEVOTHYROXINE SODIUM 175 UG/1
175 TABLET ORAL DAILY
Qty: 90 TABLET | Refills: 4 | Status: SHIPPED | OUTPATIENT
Start: 2023-08-30 | End: 2023-08-30

## 2023-08-30 RX ORDER — LEVOTHYROXINE SODIUM 150 UG/1
150 TABLET ORAL DAILY
Qty: 90 TABLET | Refills: 0 | Status: SHIPPED | OUTPATIENT
Start: 2023-08-30 | End: 2023-10-30

## 2023-08-30 ASSESSMENT — ENCOUNTER SYMPTOMS
ABDOMINAL PAIN: 0
MYALGIAS: 0
WEAKNESS: 0
JOINT SWELLING: 0
FEVER: 0
NAUSEA: 0
CHILLS: 0
ARTHRALGIAS: 0
SHORTNESS OF BREATH: 0
HEMATOCHEZIA: 0
PARESTHESIAS: 0
PALPITATIONS: 0
DIZZINESS: 0
CONSTIPATION: 0
HEMATURIA: 0
SORE THROAT: 0
COUGH: 0
DYSURIA: 0
HEARTBURN: 0
HEADACHES: 0
BREAST MASS: 0
NERVOUS/ANXIOUS: 0
DIARRHEA: 0
EYE PAIN: 0
FREQUENCY: 0

## 2023-08-30 ASSESSMENT — PAIN SCALES - GENERAL: PAINLEVEL: NO PAIN (0)

## 2023-08-30 ASSESSMENT — PATIENT HEALTH QUESTIONNAIRE - PHQ9
10. IF YOU CHECKED OFF ANY PROBLEMS, HOW DIFFICULT HAVE THESE PROBLEMS MADE IT FOR YOU TO DO YOUR WORK, TAKE CARE OF THINGS AT HOME, OR GET ALONG WITH OTHER PEOPLE: NOT DIFFICULT AT ALL
SUM OF ALL RESPONSES TO PHQ QUESTIONS 1-9: 1
SUM OF ALL RESPONSES TO PHQ QUESTIONS 1-9: 1

## 2023-08-30 NOTE — PROGRESS NOTES
SUBJECTIVE:   CC: Triny is an 53 year old who presents for preventive health visit.       8/30/2023    10:53 AM   Additional Questions   Roomed by Eve Merrill       Healthy Habits:     Bi-annual eye exam:  Yes    Dental care twice a year:  Yes    Sleep apnea or symptoms of sleep apnea:  None    Diet:  Regular (no restrictions)    Frequency of exercise:  4-5 days/week    Duration of exercise:  45-60 minutes    Taking medications regularly:  Yes    Medication side effects:  None    Additional concerns today:  No    HISTORY OF PRESENT ILLNESS  The patient is a 53-year-old female here for her annual physical exam.    She is having her IUD removed on 09/13/2023 and wants to do a Pap smear at [facility]. She is worried that she will get her period back. She just got her Valtrex refilled and she takes it only when she has an outbreak. She is not sure if she qualifies for the new RSV vaccine.  Her mental health is better.    Today's PHQ-9 Score:       8/30/2023    10:42 AM   PHQ-9 SCORE   PHQ-9 Total Score MyChart 1 (Minimal depression)   PHQ-9 Total Score 1               Social History     Tobacco Use    Smoking status: Never    Smokeless tobacco: Never   Substance Use Topics    Alcohol use: Yes     Comment: socially           8/30/2023    10:43 AM   Alcohol Use   Prescreen: >3 drinks/day or >7 drinks/week? No          No data to display              Reviewed orders with patient.  Reviewed health maintenance and updated orders accordingly - yes      Breast Cancer Screening:    FHS-7:       3/25/2022    11:33 AM 5/15/2022     8:46 AM 6/1/2023     7:24 AM 8/30/2023    10:43 AM   Breast CA Risk Assessment (FHS-7)   Did any of your first-degree relatives have breast or ovarian cancer? No No No No   Did any of your relatives have bilateral breast cancer? No No No No   Did any man in your family have breast cancer? Yes Yes Yes Yes   Did any woman in your family have breast and ovarian cancer? No No No No   Did any woman  in your family have breast cancer before age 50 y? No No No No   Do you have 2 or more relatives with breast and/or ovarian cancer? No No No No   Do you have 2 or more relatives with breast and/or bowel cancer? No No No No         Pertinent mammograms are reviewed under the imaging tab.    History of abnormal Pap smear:       Latest Ref Rng & Units 11/29/2017     8:15 AM 11/29/2017     8:00 AM 1/14/2015    12:00 AM   PAP / HPV   PAP (Historical)  NIL   NIL    HPV 16 DNA NEG^Negative  Negative     HPV 18 DNA NEG^Negative  Negative     Other HR HPV NEG^Negative  Negative       Reviewed and updated as needed this visit by clinical staff   Tobacco  Allergies  Meds  Problems  Med Hx  Surg Hx  Fam Hx          Reviewed and updated as needed this visit by Provider   Tobacco  Allergies  Meds  Problems  Med Hx  Surg Hx  Fam Hx             Review of Systems   Constitutional:  Negative for chills and fever.   HENT:  Negative for congestion, ear pain, hearing loss and sore throat.    Eyes:  Negative for pain and visual disturbance.   Respiratory:  Negative for cough and shortness of breath.    Cardiovascular:  Negative for chest pain, palpitations and peripheral edema.   Gastrointestinal:  Negative for abdominal pain, constipation, diarrhea, heartburn, hematochezia and nausea.   Breasts:  Negative for tenderness, breast mass and discharge.   Genitourinary:  Negative for dysuria, frequency, genital sores, hematuria, pelvic pain, urgency, vaginal bleeding and vaginal discharge.   Musculoskeletal:  Negative for arthralgias, joint swelling and myalgias.   Skin:  Negative for rash.   Neurological:  Negative for dizziness, weakness, headaches and paresthesias.   Psychiatric/Behavioral:  Negative for mood changes. The patient is not nervous/anxious.           OBJECTIVE:   /71 (BP Location: Right arm, Patient Position: Sitting, Cuff Size: Adult Regular)   Pulse 73   Temp 96.9  F (36.1  C) (Temporal)   Resp 15    "Ht 1.753 m (5' 9\")   Wt 68.9 kg (152 lb)   SpO2 98%   BMI 22.45 kg/m    Physical Exam  GENERAL: healthy, alert and no distress  EYES: Eyes grossly normal to inspection, PERRL and conjunctivae and sclerae normal  HENT: ear canals and TM's normal, nose and mouth without ulcers or lesions  NECK: no adenopathy, no asymmetry, masses, or scars and thyroid normal to palpation  RESP: lungs clear to auscultation - no rales, rhonchi or wheezes  BREAST: normal without masses, tenderness or nipple discharge and no palpable axillary masses or adenopathy  CV: regular rate and rhythm, normal S1 S2, no S3 or S4, no murmur, click or rub, no peripheral edema and peripheral pulses strong  ABDOMEN: soft, nontender, no hepatosplenomegaly, no masses and bowel sounds normal  MS: no gross musculoskeletal defects noted, no edema  SKIN: no suspicious lesions or rashes  NEURO: Normal strength and tone, mentation intact and speech normal  PSYCH: mentation appears normal, affect normal/bright    Diagnostic Test Results:  Labs reviewed in Epic    ASSESSMENT/PLAN:   Triny was seen today for physical.    Diagnoses and all orders for this visit:    Routine general medical examination at a health care facility  -     BASIC METABOLIC PANEL; Future  -     Lipid panel reflex to direct LDL Non-fasting; Future  -     BASIC METABOLIC PANEL  -     Lipid panel reflex to direct LDL Non-fasting    Autoimmune Cocoa's disease (H)  Comments:  Stable  Refilled florinef and prednisone  Follow up yearly  Orders:  -     fludrocortisone (FLORINEF) 0.1 MG tablet; TAKE ONE TO THREE TABLETS BY MOUTH DAILY AS NEEDED  -     predniSONE (DELTASONE) 5 MG tablet; TAKE ONE TO ONE AND ONE-HALF TABLETS BY MOUTH DAILY    Hypothyroidism due to Hashimoto's thyroiditis  Comments:  Stable.  Check TSH  Follow up yearly  Refilled levothyroxine  Orders:  -     levothyroxine (SYNTHROID/LEVOTHROID) 175 MCG tablet; Take 1 tablet (175 mcg) by mouth daily    Recurrent herpes " simplex  Comments:  Rare valtrex for outbreaks, refilled  Orders:  -     valACYclovir (VALTREX) 1000 mg tablet; Take 1 tablet (1,000 mg) by mouth 2 times daily    Cervical cancer screening  Comments:  Due; will have done at clinic [facility] when IUD removed next month.    Other orders  -     TSH WITH FREE T4 REFLEX; Future  -     REVIEW OF HEALTH MAINTENANCE PROTOCOL ORDERS  -     PRIMARY CARE FOLLOW-UP SCHEDULING; Future  -     TSH WITH FREE T4 REFLEX        Patient has been advised of split billing requirements and indicates understanding: Yes      COUNSELING:  Reviewed preventive health counseling, as reflected in patient instructions        She reports that she has never smoked. She has never used smokeless tobacco.          Mishel Mosher MD  [facility] submitted by the patient for this visit:  Patient Health Questionnaire (Submitted on 8/30/2023)  If you checked off any problems, how difficult have these problems made it for you to do your work, take care of things at home, or get along with other people?: Not difficult at all  PHQ9 TOTAL SCORE: 1    Conflicting answers have been found for some questions. Please document the patient's answers manually.

## 2023-09-01 LAB — PAP SMEAR - HIM PATIENT REPORTED: NEGATIVE

## 2023-09-03 ENCOUNTER — MYC MEDICAL ADVICE (OUTPATIENT)
Dept: FAMILY MEDICINE | Facility: CLINIC | Age: 53
End: 2023-09-03
Payer: COMMERCIAL

## 2023-09-06 NOTE — TELEPHONE ENCOUNTER
I can help patient with this via an E-visit. Can you let them know and how to start one in MyChart?     Thanks much,  Dr. Mishel Mosher MD / [facility]

## 2023-09-12 ENCOUNTER — NURSE TRIAGE (OUTPATIENT)
Dept: NURSING | Facility: CLINIC | Age: 53
End: 2023-09-12
Payer: COMMERCIAL

## 2023-09-12 ENCOUNTER — MYC MEDICAL ADVICE (OUTPATIENT)
Dept: FAMILY MEDICINE | Facility: CLINIC | Age: 53
End: 2023-09-12
Payer: COMMERCIAL

## 2023-09-12 NOTE — TELEPHONE ENCOUNTER
COVID Positive/Requesting COVID treatment    Patient is positive for COVID and requesting treatment options.    Date of positive COVID test (PCR or at home)? 9/12/23  Current COVID symptoms: fatigue, muscle or body aches, sore throat, and congestion or runny nose  Date COVID symptoms began: 9/12/23    Message should be routed to clinic RN pool. Best phone number to use for call back: [phone] ( a detailed msg may be left on )     Pt is triaged as high risk for complication d/t autoimmune disease  Pt denies higher level of care at time of this call.    Routed to Triage for Paxlovid tx at PCP clinic.    Carmina Topete RN, Nurse Advisor 4:14 PM 9/12/2023  Reason for Disposition   COVID-19 diagnosed by positive lab test (e.g., PCR, rapid self-test kit) and mild symptoms (e.g., cough, fever, others) and no complications or SOB    Additional Information   Negative: SEVERE difficulty breathing (e.g., struggling for each breath, speaks in single words)   Negative: Difficult to awaken or acting confused (e.g., disoriented, slurred speech)   Negative: Bluish (or gray) lips or face now   Negative: Shock suspected (e.g., cold/pale/clammy skin, too weak to stand, low BP, rapid pulse)   Negative: Sounds like a life-threatening emergency to the triager   Negative: Diagnosed or suspected COVID-19 and symptoms lasting 3 or more weeks   Negative: COVID-19 exposure and no symptoms   Negative: COVID-19 vaccine reaction suspected (e.g., fever, headache, muscle aches) occurring 1 to 3 days after getting vaccine   Negative: COVID-19 vaccine, questions about   Negative: Lives with someone known to have influenza (flu test positive) and flu-like symptoms (e.g., cough, runny nose, sore throat, SOB; with or without fever)   Negative: Possible COVID-19 symptoms and triager concerned about severity of symptoms or other causes   Negative: COVID-19 and breastfeeding, questions about   Negative: SEVERE or constant chest pain or pressure   (Exception: Mild central chest pain, present only when coughing.)   Negative: MODERATE difficulty breathing (e.g., speaks in phrases, SOB even at rest, pulse 100-120)   Negative: Headache and stiff neck (can't touch chin to chest)   Negative: Oxygen level (e.g., pulse oximetry) 90% or lower   Negative: Chest pain or pressure  (Exception: MILD central chest pain, present only when coughing.)   Negative: Drinking very little and dehydration suspected (e.g., no urine > 12 hours, very dry mouth, very lightheaded)   Negative: Patient sounds very sick or weak to the triager   Negative: MILD difficulty breathing (e.g., minimal/no SOB at rest, SOB with walking, pulse <100)   Negative: Fever > 103 F (39.4 C)   Negative: Fever > 101 F (38.3 C) and over 60 years of age   Negative: Fever > 100.0 F (37.8 C) and bedridden (e.g., CVA, chronic illness, recovering from surgery)   Negative: HIGH RISK patient (e.g., weak immune system, age > 64 years, obesity with BMI of 30 or higher, pregnant, chronic lung disease or other chronic medical condition) and COVID symptoms (e.g., cough, fever)  (Exceptions: Already seen by doctor or NP/PA and no new or worsening symptoms.)   Negative: HIGH RISK patient and influenza is widespread in the community and ONE OR MORE respiratory symptoms: cough, sore throat, runny or stuffy nose   Negative: HIGH RISK patient and influenza exposure within the last 7 days and ONE OR MORE respiratory symptoms: cough, sore throat, runny or stuffy nose   Negative: Oxygen level (e.g., pulse oximetry) 91 to 94%   Negative: COVID-19 infection suspected by caller or triager and mild symptoms (cough, fever, or others) and negative COVID-19 rapid test   Negative: Fever present > 3 days (72 hours)   Negative: Fever returns after gone for over 24 hours and symptoms worse or not improved   Negative: Continuous (nonstop) coughing interferes with work or school and no improvement using cough treatment per Care Advice    Negative: Cough present > 3 weeks   Negative: COVID-19 diagnosed by positive lab test (e.g., PCR, rapid self-test kit) and NO symptoms (e.g., cough, fever, others)    Protocols used: Coronavirus (COVID-19) Diagnosed or [id number]

## 2023-09-13 ENCOUNTER — VIRTUAL VISIT (OUTPATIENT)
Dept: FAMILY MEDICINE | Facility: CLINIC | Age: 53
End: 2023-09-13
Payer: COMMERCIAL

## 2023-09-13 ENCOUNTER — MYC MEDICAL ADVICE (OUTPATIENT)
Dept: FAMILY MEDICINE | Facility: CLINIC | Age: 53
End: 2023-09-13

## 2023-09-13 DIAGNOSIS — D84.9 IMMUNOSUPPRESSED STATUS (H): ICD-10-CM

## 2023-09-13 DIAGNOSIS — E06.3 HYPOTHYROIDISM DUE TO HASHIMOTO'S THYROIDITIS: ICD-10-CM

## 2023-09-13 DIAGNOSIS — G47.00 INSOMNIA, UNSPECIFIED TYPE: ICD-10-CM

## 2023-09-13 DIAGNOSIS — E27.40 CHRONIC ADRENAL INSUFFICIENCY (H): ICD-10-CM

## 2023-09-13 DIAGNOSIS — E27.1 AUTOIMMUNE ADDISON'S DISEASE (H): ICD-10-CM

## 2023-09-13 DIAGNOSIS — U07.1 INFECTION DUE TO 2019 NOVEL CORONAVIRUS: Primary | ICD-10-CM

## 2023-09-13 DIAGNOSIS — Z79.52 LONG TERM SYSTEMIC STEROID USER: ICD-10-CM

## 2023-09-13 PROCEDURE — 99214 OFFICE O/P EST MOD 30 MIN: CPT | Mod: VID | Performed by: NURSE PRACTITIONER

## 2023-09-13 NOTE — PROGRESS NOTES
Right bennett is a 53 year old who is being evaluated via a billable video visit.      How would you like to obtain your AVS? MyChart  If the video visit is dropped, the invitation should be resent by: Text to cell phone: [phone]  Will anyone else be joining your video visit? No    Assessment & Plan     Infection due to 2019 novel coronavirus  Autoimmune Lavon's disease (H)  Immunosuppressed status (H)  Corticoadrenal insufficiency  Long term systemic steroid user  Symptoms started 9/12/23.Positive COVID test 9/13/23;   Reassuring exam no higher level of care felt to be needed.   Patient is considered high risk with immunosuppression, lavon's, daily steroid use.  All medications reviewed: On daily steroids.   Normal creatinine.  Message sent to [facility]/pharmacy and patients PCP about Paxlovid and her usage of daily prednisone. Will update patient once all is reviewed.   If develops symptoms such as shortness of breath chest pain decreased oxygen saturation weakness high fever etc. should be seen in ED or call 911.   Written education provided.   Triny verbalizes understanding of plan of care and is in agreement.       ADDEND: [facility] and was advised of the following.  The [place] interaction  says there is a low risk of concern of cushings. If she tolerated it before without issues, I think you are safe to do the same thing again. Cushing's does not develop after one dose, so I would recommend starting and have her follow-up with her endocrinology team tomorrow.   Paxlovid normal renal dosing sent and mychart sent to patient.     - Med Therapy Management Referral    Hypothyroidism due to Hashimoto's thyroiditis  Insomnia, unspecified type  Ambien is a controlled substance and her insomnia needs a discussion with her PCP.   Follow up with PCP.       Return in about 2 weeks (around 9/27/2023) for virtual follow up.      RODRIGO [facility] PRIOR  MORA Agosto is a 53 year old, presenting for the following health issues:  Covid Treatment        9/13/2023    11:22 AM   Additional Questions   Roomed by Josephba MAYRA   Accompanied by Self     X6 months - Difficulty Sleeping never had issues in the past.  Requesting Ambien prescription.  PCP Dr. Mosher.   TSH   Date Value Ref Range Status   08/30/2023 0.15 (L) 0.30 - 4.20 uIU/mL Final   05/18/2022 0.17 (L) 0.40 - 4.00 mU/L Final   01/08/2021 1.33 0.40 - 4.00 mU/L Final   12/13/2019 1.30 0.40 - 4.00 mU/L Final   08/21/2018 0.92 0.40 - 4.00 mU/L Final   11/29/2017 3.14 0.40 - 4.00 mU/L Final   09/21/2016 2.74 0.40 - 5.00 mU/L Final        History of Present Illness       Reason for visit:  Covid  Symptom onset:  1-3 days ago  Symptoms include:  Sorr throat, body svhed  Symptom intensity:  Moderate  Symptom progression:  Staying the same  Had these symptoms before:  Yes  Has tried/received treatment for these symptoms:  Yes  Previous treatment was successful:  Yes  Prior treatment description:  Paxlovid    She eats 2-3 servings of fruits and vegetables daily.She consumes 0 sweetened beverage(s) daily.She exercises with enough effort to increase her heart rate 30 to 60 minutes per day.  She exercises with enough effort to increase her heart rate 7 days per week.   She is taking medications regularly.         COVID-19 Symptom Review  How many days ago did these symptoms start? Yesterday -- woke with sore throat    Are any of the following symptoms significant for you?  New or worsening difficulty breathing? No  Worsening cough? Yes, it's a dry cough.   Fever or chills? No Chills  Headache: YES  Sore throat: YES  Chest pain: No  Diarrhea: No  Body aches? YES    What treatments has patient tried? Nyquil   Does patient live in a nursing home, group home, or shelter? No  Does patient have a way to get food/medications during quarantined? Yes, I have a friend or family member who can help me.    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   Current steroids Prednisone 10 mg and Florinef 0.1 daily.    Review of Systems   Constitutional, HEENT, cardiovascular, pulmonary, GI, , musculoskeletal, neuro, skin, endocrine and psych systems are negative, except as otherwise noted in the HPI.       Objective         Vitals:  No vitals were obtained today due to virtual visit.    Physical Exam   GENERAL: Healthy, alert and no distress; appears ill  EYES: Eyes grossly normal to inspection.  No discharge or erythema, or obvious scleral/conjunctival abnormalities.  RESP: No audible wheeze, cough, or visible cyanosis.  No visible retractions or increased work of breathing.    SKIN: Visible skin clear. No significant rash, abnormal pigmentation or lesions.  NEURO: Cranial nerves grossly intact.  Mentation and speech appropriate for age.  PSYCH: Mentation appears normal, affect normal/bright, judgement and insight intact, normal speech and appearance well-groomed.      Video-Visit Details    Type of service:  Video Visit     Originating Location (pt. Location): Home  Distant Location (provider location):  On-site  Platform used for Video Visit: [employer]

## 2023-09-13 NOTE — TELEPHONE ENCOUNTER
RN COVID TREATMENT VISIT  09/13/23      The patient has been triaged and does not require a higher level of care.    Triny Florian  53 year old  Current weight? 152 lbs    Has the patient been seen by a primary care provider at an [facility] or [facility] within the past two years? Yes.   Have you been in close proximity to/do you have a known exposure to a person with a confirmed case of influenza? No.     General treatment eligibility:  Date of positive COVID test (PCR or at home)?  9/12/23    Are you or have you been hospitalized for this COVID-19 infection? No.   Have you received monoclonal antibodies or antiviral treatment for COVID-19 since this positive test? No.   Do you have any of the following conditions that place you at risk of being very sick from COVID-19?   - Age 50 years or older  - Primary Immune Deficiency Disease such as (not exhaustive - see standing order for complete list): Autoimmune Lymphoproliferative Syndrome (ALPS), Chronic Granulomatous Disease (CGD), Glycosylation Disorders with Immunodeficiency, Severe Combined Immunodeficiency (SCID), X-Linked   Yes, patient has at least one high risk condition as noted above.     Current COVID symptoms:   - fatigue  - muscle or body aches  - congestion or runny nose  Yes. Patient has at least one symptom as selected.     How many days since symptoms started? 5 days or less. Established patient, 12 years or older weighing at least 88.2 lbs, who has symptoms that started in the past 5 days, has not been hospitalized nor received treatment already, and is at risk for being very sick from COVID-19.     Treatment eligibility by RN:  Are you currently pregnant or nursing? No  Do you have a clinically significant hypersensitivity to nirmatrelvir or ritonavir, or toxic epidermal necrolysis (TEN) or Mar-Nomi Syndrome? No  Do you have a history of hepatitis, any hepatic impairment on the Problem List (such as Child-West  Class C, cirrhosis, fatty liver disease, alcoholic liver disease), or was the last liver lab (hepatic panel, ALT, AST, ALK Phos, bilirubin) elevated in the past 6 months? No  Do you have any history of severe renal impairment (eGFR < 30mL/min)? No    Is patient eligible to continue? Yes, patient meets all eligibility requirements for the RN COVID treatment (as denoted by all no responses above).     Current Outpatient Medications   Medication Sig Dispense Refill    fludrocortisone (FLORINEF) 0.1 MG tablet TAKE ONE TO THREE TABLETS BY MOUTH DAILY AS NEEDED 270 tablet 4    levothyroxine (SYNTHROID/LEVOTHROID) 150 MCG tablet Take 1 tablet (150 mcg) by mouth daily 90 tablet 0    predniSONE (DELTASONE) 5 MG tablet TAKE ONE TO ONE AND ONE-HALF TABLETS BY MOUTH DAILY 135 tablet 4    valACYclovir (VALTREX) 1000 mg tablet Take 1 tablet (1,000 mg) by mouth 2 times daily 60 tablet 1       Medications from List 1 of the standing order (on medications that exclude the use of Paxlovid) that patient is taking: NONE. Is patient taking Lorrie's Wort? No  Is patient taking Lorrie's Wort or any meds from List 1? No.   Medications from List 2 of the standing order (on meds that provider needs to adjust) that patient is taking: oral corticosteriods, explained a provider visit is necessary to discuss medication adjustments while taking Paxlovid. Is patient on any of the meds from List 2? Yes. Patient will be scheduled or transferred to a  at the end of this call.   Mary Call RN

## 2023-09-14 ENCOUNTER — TELEPHONE (OUTPATIENT)
Dept: FAMILY MEDICINE | Facility: CLINIC | Age: 53
End: 2023-09-14
Payer: COMMERCIAL

## 2023-09-14 NOTE — TELEPHONE ENCOUNTER
Writer called patient and LVM to inform them their requested ex was sent to pt preferred pharmacy.    FABIO CoxN, RN  [facility]

## 2023-09-24 ENCOUNTER — E-VISIT (OUTPATIENT)
Dept: FAMILY MEDICINE | Facility: CLINIC | Age: 53
End: 2023-09-24
Payer: COMMERCIAL

## 2023-09-24 DIAGNOSIS — G47.00 INSOMNIA, UNSPECIFIED TYPE: Primary | ICD-10-CM

## 2023-09-24 PROCEDURE — 99421 OL DIG E/M SVC 5-10 MIN: CPT | Performed by: FAMILY MEDICINE

## 2023-09-25 RX ORDER — TRAZODONE HYDROCHLORIDE 50 MG/1
50-100 TABLET, FILM COATED ORAL
Qty: 90 TABLET | Refills: 0 | Status: SHIPPED | OUTPATIENT
Start: 2023-09-25 | End: 2023-10-27

## 2023-09-25 RX ORDER — ZOLPIDEM TARTRATE 10 MG/1
5 TABLET ORAL
Qty: 6 TABLET | Refills: 0 | Status: SHIPPED | OUTPATIENT
Start: 2023-09-25 | End: 2024-06-05

## 2023-09-25 RX ORDER — ZOLPIDEM TARTRATE 10 MG/1
5 TABLET ORAL
Qty: 6 TABLET | Refills: 0 | Status: SHIPPED | OUTPATIENT
Start: 2023-09-25 | End: 2023-09-25

## 2023-10-26 ENCOUNTER — LAB (OUTPATIENT)
Dept: LAB | Facility: CLINIC | Age: 53
End: 2023-10-26
Payer: COMMERCIAL

## 2023-10-26 DIAGNOSIS — E06.3 HYPOTHYROIDISM DUE TO HASHIMOTO'S THYROIDITIS: ICD-10-CM

## 2023-10-26 PROCEDURE — 84443 ASSAY THYROID STIM HORMONE: CPT

## 2023-10-26 PROCEDURE — 36415 COLL VENOUS BLD VENIPUNCTURE: CPT

## 2023-10-26 PROCEDURE — 84439 ASSAY OF FREE THYROXINE: CPT

## 2023-10-27 ENCOUNTER — E-VISIT (OUTPATIENT)
Dept: FAMILY MEDICINE | Facility: CLINIC | Age: 53
End: 2023-10-27
Payer: COMMERCIAL

## 2023-10-27 DIAGNOSIS — E27.1 AUTOIMMUNE ADDISON'S DISEASE (H): Primary | ICD-10-CM

## 2023-10-27 DIAGNOSIS — E06.3 HYPOTHYROIDISM DUE TO HASHIMOTO'S THYROIDITIS: ICD-10-CM

## 2023-10-27 DIAGNOSIS — G47.00 INSOMNIA, UNSPECIFIED TYPE: ICD-10-CM

## 2023-10-27 LAB
T4 FREE SERPL-MCNC: 1.72 NG/DL (ref 0.9–1.7)
TSH SERPL DL<=0.005 MIU/L-ACNC: 0.22 UIU/ML (ref 0.3–4.2)

## 2023-10-27 PROCEDURE — 99421 OL DIG E/M SVC 5-10 MIN: CPT | Performed by: FAMILY MEDICINE

## 2023-10-27 RX ORDER — PROMETHAZINE HYDROCHLORIDE 25 MG/1
25 TABLET ORAL EVERY 6 HOURS PRN
Qty: 20 TABLET | Refills: 0 | Status: SHIPPED | OUTPATIENT
Start: 2023-10-27 | End: 2024-09-24

## 2023-10-27 RX ORDER — MIRTAZAPINE 7.5 MG/1
7.5 TABLET, FILM COATED ORAL AT BEDTIME
Qty: 90 TABLET | Refills: 3 | Status: SHIPPED | OUTPATIENT
Start: 2023-10-27 | End: 2024-09-18

## 2023-10-27 NOTE — PATIENT INSTRUCTIONS
Thank you for choosing us for your care. I have placed an order for a prescription so that you can start treatment. View your full visit summary for details by clicking on the link below. Your pharmacist will able to address any questions you may have about the medication.     If you're not feeling better within 5-7 days, please schedule an appointment.  You can schedule an appointment right here in [facility], or call [phone]  If the visit is for the same symptoms as your eVisit, we'll refund the cost of your eVisit if seen within seven days.

## 2023-10-30 RX ORDER — LEVOTHYROXINE SODIUM 137 UG/1
150 TABLET ORAL DAILY
Qty: 90 TABLET | Refills: 3 | Status: SHIPPED | OUTPATIENT
Start: 2023-10-30 | End: 2024-02-29

## 2023-11-16 ENCOUNTER — MYC MEDICAL ADVICE (OUTPATIENT)
Dept: PODIATRY | Facility: CLINIC | Age: 53
End: 2023-11-16
Payer: COMMERCIAL

## 2023-11-16 DIAGNOSIS — M20.5X1 HALLUX LIMITUS, RIGHT: ICD-10-CM

## 2023-11-16 DIAGNOSIS — M79.671 CHRONIC FOOT PAIN, RIGHT: Primary | ICD-10-CM

## 2023-11-16 DIAGNOSIS — M79.672 CHRONIC FOOT PAIN, LEFT: ICD-10-CM

## 2023-11-16 DIAGNOSIS — M19.079 ARTHRITIS, MIDFOOT: ICD-10-CM

## 2023-11-16 DIAGNOSIS — G89.29 CHRONIC FOOT PAIN, RIGHT: Primary | ICD-10-CM

## 2023-11-16 DIAGNOSIS — G89.29 CHRONIC FOOT PAIN, LEFT: ICD-10-CM

## 2023-11-17 NOTE — TELEPHONE ENCOUNTER
Please see Mychart request for multiple injections and a new Orthotics referral and advise.     JUAN M Craig RN

## 2023-12-20 ENCOUNTER — OFFICE VISIT (OUTPATIENT)
Dept: ORTHOPEDICS | Facility: CLINIC | Age: 53
End: 2023-12-20
Attending: PODIATRIST
Payer: COMMERCIAL

## 2023-12-20 ENCOUNTER — LAB (OUTPATIENT)
Dept: LAB | Facility: CLINIC | Age: 53
End: 2023-12-20
Payer: COMMERCIAL

## 2023-12-20 DIAGNOSIS — M79.672 FOOT PAIN, BILATERAL: ICD-10-CM

## 2023-12-20 DIAGNOSIS — M19.071 ARTHRITIS OF BOTH FEET: ICD-10-CM

## 2023-12-20 DIAGNOSIS — E06.3 HYPOTHYROIDISM DUE TO HASHIMOTO'S THYROIDITIS: ICD-10-CM

## 2023-12-20 DIAGNOSIS — M20.5X1 HALLUX LIMITUS, RIGHT: Primary | ICD-10-CM

## 2023-12-20 DIAGNOSIS — M79.671 FOOT PAIN, BILATERAL: ICD-10-CM

## 2023-12-20 DIAGNOSIS — M19.072 ARTHRITIS OF BOTH FEET: ICD-10-CM

## 2023-12-20 PROCEDURE — 84443 ASSAY THYROID STIM HORMONE: CPT

## 2023-12-20 PROCEDURE — 36415 COLL VENOUS BLD VENIPUNCTURE: CPT

## 2023-12-20 PROCEDURE — 20604 DRAIN/INJ JOINT/BURSA W/US: CPT | Mod: 50 | Performed by: FAMILY MEDICINE

## 2023-12-20 PROCEDURE — 20604 DRAIN/INJ JOINT/BURSA W/US: CPT | Mod: LT | Performed by: PODIATRIST

## 2023-12-20 RX ORDER — ROPIVACAINE HYDROCHLORIDE 5 MG/ML
0.5 INJECTION, SOLUTION EPIDURAL; INFILTRATION; PERINEURAL
Status: DISCONTINUED | OUTPATIENT
Start: 2023-12-20 | End: 2024-06-05

## 2023-12-20 RX ORDER — METHYLPREDNISOLONE ACETATE 40 MG/ML
40 INJECTION, SUSPENSION INTRA-ARTICULAR; INTRALESIONAL; INTRAMUSCULAR; SOFT TISSUE
Status: DISCONTINUED | OUTPATIENT
Start: 2023-12-20 | End: 2024-06-05

## 2023-12-20 RX ORDER — METHYLPREDNISOLONE ACETATE 40 MG/ML
20 INJECTION, SUSPENSION INTRA-ARTICULAR; INTRALESIONAL; INTRAMUSCULAR; SOFT TISSUE
Status: DISCONTINUED | OUTPATIENT
Start: 2023-12-20 | End: 2024-06-05

## 2023-12-20 RX ORDER — ROPIVACAINE HYDROCHLORIDE 5 MG/ML
0.25 INJECTION, SOLUTION EPIDURAL; INFILTRATION; PERINEURAL
Status: DISCONTINUED | OUTPATIENT
Start: 2023-12-20 | End: 2024-06-05

## 2023-12-20 RX ADMIN — ROPIVACAINE HYDROCHLORIDE 0.5 ML: 5 INJECTION, SOLUTION EPIDURAL; INFILTRATION; PERINEURAL at 11:11

## 2023-12-20 RX ADMIN — METHYLPREDNISOLONE ACETATE 20 MG: 40 INJECTION, SUSPENSION INTRA-ARTICULAR; INTRALESIONAL; INTRAMUSCULAR; SOFT TISSUE at 11:11

## 2023-12-20 RX ADMIN — METHYLPREDNISOLONE ACETATE 40 MG: 40 INJECTION, SUSPENSION INTRA-ARTICULAR; INTRALESIONAL; INTRAMUSCULAR; SOFT TISSUE at 11:07

## 2023-12-20 RX ADMIN — ROPIVACAINE HYDROCHLORIDE 0.5 ML: 5 INJECTION, SOLUTION EPIDURAL; INFILTRATION; PERINEURAL at 11:37

## 2023-12-20 RX ADMIN — ROPIVACAINE HYDROCHLORIDE 0.25 ML: 5 INJECTION, SOLUTION EPIDURAL; INFILTRATION; PERINEURAL at 11:07

## 2023-12-20 RX ADMIN — METHYLPREDNISOLONE ACETATE 40 MG: 40 INJECTION, SUSPENSION INTRA-ARTICULAR; INTRALESIONAL; INTRAMUSCULAR; SOFT TISSUE at 11:37

## 2023-12-20 NOTE — PROGRESS NOTES
ASSESSMENT & PLAN  Patient Instructions     1. Hallux limitus, right    2. Foot pain, bilateral    3. Arthritis of both feet      -Patient has bilateral foot pain due to arthritis.  Patient is referred by Dr. Campo for bilateral foot cortisone injections  -Patient tolerated right first MTP, right third TMT and left third and fourth TMT joint cortisone injections today without complications.  Patient was given postprocedure instructions  -Patient will start formal physical therapy and home exercise program  -Patient may follow-up when pain returns   -Call direct clinic number [[phone]] at any time with questions or concerns.    Albert Yeo MD [facility] Orthopedics and Sports Medicine  [facility]        -----    SUBJECTIVE:  Triny Florian is a 53 year old female who is seen for bilateral 1st MTP and bilateral 3rd TMT joints at the request of Sharif Mauricio.      Small Joint Injection/Arthrocentesis: R great MTP    Date/Time: 12/20/2023 11:07 AM    Performed by: Yeo, Albert, MD  Authorized by: Yeo, Albert, MD  Indications:  Pain  Needle Size:  25 G  Guidance: ultrasound     Approach:  Dorsal  Location:  Great toe    Site:  R great MTP                    Medications:  40 mg methylPREDNISolone 40 MG/ML; 0.25 mL ROPivacaine 5 MG/ML        Outcome:  Tolerated well, no immediate complications  Procedure discussed: discussed risks, benefits, and alternatives    Consent Given by:  Patient  Timeout: timeout called immediately prior to procedure    Prep: patient was prepped and draped in usual sterile fashion       Ultrasound was used to ensure safe and accurate needle placement and injection. Ultrasound images of the procedure were permanently stored.      Bilateral 3rd TMT Small Joint Injection/Arthrocentesis    Date/Time: 12/20/2023 11:11 AM    Performed by: Yeo, Albert, MD  Authorized by: Yeo, Albert, MD  Indications:  Pain  Needle Size:  25 G  Guidance: ultrasound     Approach:   Dorsal  Location:  Foot   Location comment:  Bilateral 3rd TMT joints                      Medications:  20 mg methylPREDNISolone 40 MG/ML; 0.5 mL ROPivacaine 5 MG/ML        Outcome:  Tolerated well, no immediate complications  Procedure discussed: discussed risks, benefits, and alternatives    Consent Given by:  Patient  Timeout: timeout called immediately prior to procedure    Prep: patient was prepped and draped in usual sterile fashion       Ultrasound was used to ensure safe and accurate needle placement and injection. Ultrasound images of the procedure were permanently stored.      L 4th TMT Small Joint Injection/Arthrocentesis    Date/Time: 12/20/2023 11:37 AM    Performed by: Caitlyn Khan DPM, Podiatry/Foot and Ankle Surgery  Authorized by: Caitlyn Khna DPM, Podiatry/Foot and Ankle Surgery  Indications:  Pain  Needle Size:  25 G  Guidance: ultrasound     Approach:  Dorsal     Location comment:  L 4th TMT joint                      Medications:  40 mg methylPREDNISolone 40 MG/ML; 0.5 mL ROPivacaine 5 MG/ML        Outcome:  Tolerated well, no immediate complications  Procedure discussed: discussed risks, benefits, and alternatives    Consent Given by:  Patient  Timeout: timeout called immediately prior to procedure    Prep: patient was prepped and draped in usual sterile fashion       Ultrasound was used to ensure safe and accurate needle placement and injection. Ultrasound images of the procedure were permanently stored.            Albert Yeo MD, [facility]

## 2023-12-20 NOTE — PATIENT INSTRUCTIONS
1. Hallux limitus, right    2. Foot pain, bilateral    3. Arthritis of both feet      -Patient has bilateral foot pain due to arthritis.  Patient is referred by Dr. Campo for bilateral foot cortisone injections  -Patient tolerated right first MTP, right third TMT and left third and fourth TMT joint cortisone injections today without complications.  Patient was given postprocedure instructions  -Patient will start formal physical therapy and home exercise program  -Patient may follow-up when pain returns   -Call direct clinic number [[phone]] at any time with questions or concerns.    Albert Yeo MD [facility]  [facility]

## 2023-12-21 LAB — TSH SERPL DL<=0.005 MIU/L-ACNC: 4.19 UIU/ML (ref 0.3–4.2)

## 2023-12-22 ENCOUNTER — MYC MEDICAL ADVICE (OUTPATIENT)
Dept: ORTHOPEDICS | Facility: CLINIC | Age: 53
End: 2023-12-22
Payer: COMMERCIAL

## 2023-12-22 ENCOUNTER — THERAPY VISIT (OUTPATIENT)
Dept: PHYSICAL THERAPY | Facility: CLINIC | Age: 53
End: 2023-12-22
Attending: FAMILY MEDICINE
Payer: COMMERCIAL

## 2023-12-22 DIAGNOSIS — M19.072 ARTHRITIS OF BOTH FEET: ICD-10-CM

## 2023-12-22 DIAGNOSIS — M20.5X1 HALLUX LIMITUS, RIGHT: ICD-10-CM

## 2023-12-22 DIAGNOSIS — M79.672 FOOT PAIN, BILATERAL: ICD-10-CM

## 2023-12-22 DIAGNOSIS — M19.071 ARTHRITIS OF BOTH FEET: ICD-10-CM

## 2023-12-22 DIAGNOSIS — M79.671 FOOT PAIN, BILATERAL: ICD-10-CM

## 2023-12-22 PROCEDURE — 97161 PT EVAL LOW COMPLEX 20 MIN: CPT | Mod: GP | Performed by: PHYSICAL THERAPIST

## 2023-12-22 PROCEDURE — 97110 THERAPEUTIC EXERCISES: CPT | Mod: GP | Performed by: PHYSICAL THERAPIST

## 2023-12-22 PROCEDURE — 97530 THERAPEUTIC ACTIVITIES: CPT | Mod: GP | Performed by: PHYSICAL THERAPIST

## 2023-12-22 NOTE — PROGRESS NOTES
PHYSICAL THERAPY EVALUATION  Type of Visit: Evaluation    See electronic medical record for Abuse and Falls Screening details.    Subjective   R big toe has arthritis and doesn't bend much, bilateral bottom of the foot pain, has gotten worse over the last few years, doesn't hurt when she bikes, had stopped running due to the pain. She is on her feet all day at work and with sitting for longer periods of time and then getting up it is stiff. Morning time she gets stiffness, and at night. R big toe and 2 on the L cortizone injections on 12/20. She has orthotics she wears in every pair, probably like 5 years ago, rarely goes barefoot if she can avoid it.       Presenting condition or subjective complaint: Foot pain  Date of onset: 12/20/23 (PT order)    Relevant medical history:     Past Medical History:   Diagnosis Date    Acute bronchospasm 2/27/2020    Addisons disease (H)     Arthritis 2016    great toes, thumbs    CARDIOVASCULAR SCREENING; LDL GOAL LESS THAN 160 5/23/2011    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 central line placement when being diagnosed    Hepatitis 11/10/2006    History of blood transfusion 1998    Other diseases of lung, not elsewhere classified 3/21/2007    Overview:  pulm nodule: ANNAMARIA suprahilar 7 mm non calcified noted on CXR after TB exposure at work  CT scheduled-    Painful scar 5/23/2012    where feeding tube is    Thyroid disease 1995    hypothyroid     Dates & types of surgery:    Past Surgical History:   Procedure Laterality Date    CHOLECYSTECTOMY      COLONOSCOPY  2018    GYN SURGERY      ORTHOPEDIC SURGERY  2016    toe surgery       Prior diagnostic imaging/testing results: MRI     Prior therapy history for the same diagnosis, illness or injury: No      Living Environment  Social support: With a significant other or spouse   Type of home: House   Stairs to enter the home: Yes   Is there a railing: No   Ramp: No   Stairs inside the home: Yes       Help at home: None    Employment: Yes     Hobbies/Interests:  biking    Patient goals for therapy:      Pain assessment: Pain present  See objective evaluation for additional pain details     Objective     FOOT/ANKLE EVALUATION  PAIN: Pain Location: top of foot and bottom of foot, makes you not want to walk on it  Pain Quality: achy dull pain symptoms  Pain Frequency: all the time, just depends on what she does  Pain is Worst: constantly, morning and night time  Pain is Exacerbated By: hiking and the after affect, walking, standing on her feet, waking up in the morning, toe hurts when she bends it  Pain is Relieved By: injections  Pain Progression: gotten worse over the last few years     Balance/Proprioception: limited RLE>LLE SLS - more stiffness       ROM: WFL unless noted  (Degrees) Left AROM Left PROM  Right AROM Right PROM   Ankle Dorsiflexion Calf tightness  Calf tightness    Ankle Plantarflexion       Ankle Inversion       Ankle Eversion       Great Toe Flexion   End range limitation with P    Great Toe Extension   End range limitation      Strength: NT today - limited gastroc standing strength due to RLE toe flexibility    PALPATION: none found today with mobility and testing  JOINT MOBILITY: decreased of midfoot bilaterally      Assessment & Plan   CLINICAL IMPRESSIONS  Medical Diagnosis: Bilateral foot pain    Treatment Diagnosis: Arthritis in feet, R big toe decreased range of motion   Impression/Assessment: Patient is a 53 year old female with bilateral foot pain complaints.  The following significant findings have been identified: Pain, Decreased ROM/flexibility, and Decreased activity tolerance. These impairments interfere with their ability to perform self care tasks, work tasks, recreational activities, household chores, household mobility, and community mobility as compared to previous level of function.     Clinical Decision Making (Complexity):  Clinical Presentation: Stable/Uncomplicated  Clinical Presentation Rationale: based on  medical and personal factors listed in PT evaluation  Clinical Decision Making (Complexity): Low complexity    PLAN OF CARE  Treatment Interventions:  Modalities: Cryotherapy, E-stim, Laser Light, Ultrasound  Interventions: Gait Training, Manual Therapy, Neuromuscular Re-education, Therapeutic Activity, Therapeutic Exercise, Self-Care/Home Management    Long Term Goals     PT Goal 1  Goal Identifier: standing at work  Goal Description: Patient will be able to stand at a shift a work, return home and report decreased foot pain symptoms and stiffness in order to perform daily household chores without difficulty, at least 3 days of the week.  Target Date: 02/05/24  PT Goal 2  Goal Identifier: waking up  Goal Description: Patient will be able to wake up at least 5/7 days of the week without difficulty or pain going down stairs due to feet/toe pain in order to improve morning time mobility.  Target Date: 03/01/24      Frequency of Treatment: 1x/week  Duration of Treatment: 10 weeks    Education Assessment:        Risks and benefits of evaluation/treatment have been explained.   Patient/Family/caregiver agrees with Plan of Care.     Evaluation Time:     PT Eval, Low Complexity Minutes (55383): 15     Signing Clinician: OLIVER GERARD PT

## 2024-01-02 ENCOUNTER — THERAPY VISIT (OUTPATIENT)
Dept: PHYSICAL THERAPY | Facility: CLINIC | Age: 54
End: 2024-01-02
Payer: COMMERCIAL

## 2024-01-02 DIAGNOSIS — M19.072 ARTHRITIS OF BOTH FEET: ICD-10-CM

## 2024-01-02 DIAGNOSIS — M20.5X1 HALLUX LIMITUS, RIGHT: ICD-10-CM

## 2024-01-02 DIAGNOSIS — M79.672 FOOT PAIN, BILATERAL: Primary | ICD-10-CM

## 2024-01-02 DIAGNOSIS — M79.671 FOOT PAIN, BILATERAL: Primary | ICD-10-CM

## 2024-01-02 DIAGNOSIS — M19.071 ARTHRITIS OF BOTH FEET: ICD-10-CM

## 2024-01-02 PROCEDURE — 97110 THERAPEUTIC EXERCISES: CPT | Mod: GP | Performed by: PHYSICAL THERAPIST

## 2024-01-02 PROCEDURE — 97140 MANUAL THERAPY 1/> REGIONS: CPT | Mod: GP | Performed by: PHYSICAL THERAPIST

## 2024-01-03 PROBLEM — M79.672 FOOT PAIN, BILATERAL: Status: ACTIVE | Noted: 2024-01-03

## 2024-01-03 PROBLEM — M20.5X1 HALLUX LIMITUS, RIGHT: Status: ACTIVE | Noted: 2024-01-03

## 2024-01-03 PROBLEM — M19.072 ARTHRITIS OF BOTH FEET: Status: ACTIVE | Noted: 2024-01-03

## 2024-01-03 PROBLEM — M19.071 ARTHRITIS OF BOTH FEET: Status: ACTIVE | Noted: 2024-01-03

## 2024-01-03 PROBLEM — M79.671 FOOT PAIN, BILATERAL: Status: ACTIVE | Noted: 2024-01-03

## 2024-02-13 ENCOUNTER — THERAPY VISIT (OUTPATIENT)
Dept: PHYSICAL THERAPY | Facility: CLINIC | Age: 54
End: 2024-02-13
Payer: COMMERCIAL

## 2024-02-13 DIAGNOSIS — M79.671 FOOT PAIN, BILATERAL: Primary | ICD-10-CM

## 2024-02-13 DIAGNOSIS — M19.072 ARTHRITIS OF BOTH FEET: ICD-10-CM

## 2024-02-13 DIAGNOSIS — M19.071 ARTHRITIS OF BOTH FEET: ICD-10-CM

## 2024-02-13 DIAGNOSIS — M79.672 FOOT PAIN, BILATERAL: Primary | ICD-10-CM

## 2024-02-13 DIAGNOSIS — M20.5X1 HALLUX LIMITUS, RIGHT: ICD-10-CM

## 2024-02-13 PROCEDURE — 97110 THERAPEUTIC EXERCISES: CPT | Mod: GP | Performed by: PHYSICAL THERAPIST

## 2024-02-15 ENCOUNTER — VIRTUAL VISIT (OUTPATIENT)
Dept: FAMILY MEDICINE | Facility: CLINIC | Age: 54
End: 2024-02-15
Payer: COMMERCIAL

## 2024-02-15 DIAGNOSIS — J01.00 ACUTE NON-RECURRENT MAXILLARY SINUSITIS: Primary | ICD-10-CM

## 2024-02-15 PROCEDURE — 99213 OFFICE O/P EST LOW 20 MIN: CPT | Mod: 95 | Performed by: STUDENT IN AN ORGANIZED HEALTH CARE EDUCATION/TRAINING PROGRAM

## 2024-02-15 NOTE — PROGRESS NOTES
"Triny is a 53 year old who is being evaluated via a billable video visit.      How would you like to obtain your AVS? MyChart  If the video visit is dropped, the invitation should be resent by: Text to cell phone: [phone]  Will anyone else be joining your video visit? No          Assessment & Plan     Acute non-recurrent maxillary sinusitis  Symptom duration of 9 days with worsening symptoms yesterday. I would recommend treating with augmentin for 7 days and consider flonase and/or cornelius pot. Rest, hydrate and supportive care. If not improving be seen in clinic.   - amoxicillin-clavulanate (AUGMENTIN) 875-125 MG tablet; Take 1 tablet by mouth 2 times daily for 7 days      Subjective   Triny is a 53 year old, presenting for the following health issues:  Illness        9/13/2023    11:22 AM   Additional Questions   Roomed by David NUNEZ   Accompanied by Self     History of Present Illness       Headaches:   Since the patient's last clinic visit, headaches are: worsened  The patient is getting headaches:  All the time  She is not able to do normal daily activities when she has a migraine.  The patient is taking the following rescue/relief medications:  No rescue/relief medications   Patient states \"I get some relief\" from the rescue/relief medications.   The patient is taking the following medications to prevent migraines:  No medications to prevent migraines  In the past 4 weeks, the patient has gone to an Urgent Care or Emergency Room 0 times times due to headaches.    Reason for visit:  Cold symptoms for 9 days. legthargy, sore throat, clogged nose, headaches, facial pain.  Symptom onset:  1-2 weeks ago  Symptoms include:  See above  Symptom intensity:  Moderate  Symptom progression:  Staying the same  Had these symptoms before:  No  What makes it better:  No    She eats 2-3 servings of fruits and vegetables daily.She consumes 0 sweetened beverage(s) daily.She exercises with enough effort to increase her " heart rate 30 to 60 minutes per day.  She exercises with enough effort to increase her heart rate 5 days per week.   She is taking medications regularly.         Symptoms started 9 days ago. Started with a sore throat, next day developed, coughing, congestion, fatigue, headaches. On the couch resting for past 10 days. Symptoms have been pretty constant. Only tried to got o work one day but had to leave after 2 hours because of fatigue and congestion and headache. Yesterday face was hurting more and felt more hot. Endorses night sweats.  had URI too. Had covid test yesterday that was negative       Review of Systems  Constitutional, HEENT, cardiovascular, pulmonary, gi and gu systems are negative, except as otherwise noted.      Objective           Vitals:  No vitals were obtained today due to virtual visit.    Physical Exam   GENERAL: alert and no distress, fatigue, ill appearing  EYES: Eyes grossly normal to inspection.  No discharge or erythema, or obvious scleral/conjunctival abnormalities.  RESP: No audible wheeze, cough, or visible cyanosis.  Lot of nasal congestion  SKIN: Visible skin clear. No significant rash, abnormal pigmentation or lesions.  NEURO: Cranial nerves grossly intact.  Mentation and speech appropriate for age.  PSYCH: Appropriate affect, tone, and pace of words          Video-Visit Details    Type of service:  Video Visit     Originating Location (pt. Location): Home    Distant Location (provider location):  On-site  Platform used for Video Visit: Fartun  Signed Electronically by: Puja Chand DO

## 2024-02-22 ENCOUNTER — LAB (OUTPATIENT)
Dept: LAB | Facility: CLINIC | Age: 54
End: 2024-02-22
Payer: COMMERCIAL

## 2024-02-22 DIAGNOSIS — E06.3 HYPOTHYROIDISM DUE TO HASHIMOTO'S THYROIDITIS: ICD-10-CM

## 2024-02-22 LAB
T4 FREE SERPL-MCNC: 1.37 NG/DL (ref 0.9–1.7)
TSH SERPL DL<=0.005 MIU/L-ACNC: 6.37 UIU/ML (ref 0.3–4.2)

## 2024-02-22 PROCEDURE — 36415 COLL VENOUS BLD VENIPUNCTURE: CPT

## 2024-02-22 PROCEDURE — 84439 ASSAY OF FREE THYROXINE: CPT

## 2024-02-22 PROCEDURE — 84443 ASSAY THYROID STIM HORMONE: CPT

## 2024-02-23 ENCOUNTER — MYC MEDICAL ADVICE (OUTPATIENT)
Dept: FAMILY MEDICINE | Facility: CLINIC | Age: 54
End: 2024-02-23
Payer: COMMERCIAL

## 2024-02-23 DIAGNOSIS — E06.3 HYPOTHYROIDISM DUE TO HASHIMOTO'S THYROIDITIS: Primary | ICD-10-CM

## 2024-02-26 NOTE — TELEPHONE ENCOUNTER
Dr. Mosher--    Pt following up on your result message-- wondering if she should go back on the dose she was on for 25 years-- 175 mcg    TSH   Date Value Ref Range Status   02/22/2024 6.37 (H) 0.30 - 4.20 uIU/mL Final   05/18/2022 0.17 (L) 0.40 - 4.00 mU/L Final   01/08/2021 1.33 0.40 - 4.00 mU/L Final       FABIO DonahueN RN  [facility]

## 2024-02-28 RX ORDER — LEVOTHYROXINE SODIUM 150 UG/1
175 TABLET ORAL DAILY
Qty: 90 TABLET | Refills: 4 | Status: SHIPPED | OUTPATIENT
Start: 2024-02-28 | End: 2024-08-07

## 2024-05-02 ENCOUNTER — PATIENT OUTREACH (OUTPATIENT)
Dept: CARE COORDINATION | Facility: CLINIC | Age: 54
End: 2024-05-02
Payer: COMMERCIAL

## 2024-05-17 ENCOUNTER — OFFICE VISIT (OUTPATIENT)
Dept: ORTHOPEDICS | Facility: CLINIC | Age: 54
End: 2024-05-17
Payer: COMMERCIAL

## 2024-05-17 VITALS
HEIGHT: 69 IN | SYSTOLIC BLOOD PRESSURE: 112 MMHG | DIASTOLIC BLOOD PRESSURE: 68 MMHG | BODY MASS INDEX: 22.51 KG/M2 | WEIGHT: 152 LBS

## 2024-05-17 DIAGNOSIS — M72.2 PLANTAR FASCIITIS OF RIGHT FOOT: ICD-10-CM

## 2024-05-17 DIAGNOSIS — M19.072 ARTHRITIS OF BOTH FEET: ICD-10-CM

## 2024-05-17 DIAGNOSIS — M19.071 ARTHRITIS OF BOTH FEET: ICD-10-CM

## 2024-05-17 DIAGNOSIS — M79.672 FOOT PAIN, BILATERAL: Primary | ICD-10-CM

## 2024-05-17 DIAGNOSIS — M79.671 FOOT PAIN, BILATERAL: Primary | ICD-10-CM

## 2024-05-17 PROCEDURE — 20604 DRAIN/INJ JOINT/BURSA W/US: CPT | Mod: 50 | Performed by: FAMILY MEDICINE

## 2024-05-17 PROCEDURE — 20550 NJX 1 TENDON SHEATH/LIGAMENT: CPT | Mod: RT | Performed by: FAMILY MEDICINE

## 2024-05-17 PROCEDURE — 99214 OFFICE O/P EST MOD 30 MIN: CPT | Mod: 25 | Performed by: FAMILY MEDICINE

## 2024-05-17 RX ORDER — ROPIVACAINE HYDROCHLORIDE 5 MG/ML
0.5 INJECTION, SOLUTION EPIDURAL; INFILTRATION; PERINEURAL
Status: SHIPPED | OUTPATIENT
Start: 2024-05-17

## 2024-05-17 RX ORDER — METHYLPREDNISOLONE ACETATE 40 MG/ML
20 INJECTION, SUSPENSION INTRA-ARTICULAR; INTRALESIONAL; INTRAMUSCULAR; SOFT TISSUE
Status: SHIPPED | OUTPATIENT
Start: 2024-05-17

## 2024-05-17 RX ORDER — METHYLPREDNISOLONE ACETATE 40 MG/ML
40 INJECTION, SUSPENSION INTRA-ARTICULAR; INTRALESIONAL; INTRAMUSCULAR; SOFT TISSUE
Status: SHIPPED | OUTPATIENT
Start: 2024-05-17

## 2024-05-17 RX ADMIN — ROPIVACAINE HYDROCHLORIDE 0.5 ML: 5 INJECTION, SOLUTION EPIDURAL; INFILTRATION; PERINEURAL at 08:33

## 2024-05-17 RX ADMIN — METHYLPREDNISOLONE ACETATE 20 MG: 40 INJECTION, SUSPENSION INTRA-ARTICULAR; INTRALESIONAL; INTRAMUSCULAR; SOFT TISSUE at 08:30

## 2024-05-17 RX ADMIN — METHYLPREDNISOLONE ACETATE 40 MG: 40 INJECTION, SUSPENSION INTRA-ARTICULAR; INTRALESIONAL; INTRAMUSCULAR; SOFT TISSUE at 08:33

## 2024-05-17 RX ADMIN — ROPIVACAINE HYDROCHLORIDE 0.5 ML: 5 INJECTION, SOLUTION EPIDURAL; INFILTRATION; PERINEURAL at 08:32

## 2024-05-17 RX ADMIN — METHYLPREDNISOLONE ACETATE 40 MG: 40 INJECTION, SUSPENSION INTRA-ARTICULAR; INTRALESIONAL; INTRAMUSCULAR; SOFT TISSUE at 08:32

## 2024-05-17 RX ADMIN — ROPIVACAINE HYDROCHLORIDE 0.5 ML: 5 INJECTION, SOLUTION EPIDURAL; INFILTRATION; PERINEURAL at 08:30

## 2024-05-17 ASSESSMENT — PAIN SCALES - GENERAL: PAINLEVEL: EXTREME PAIN (8)

## 2024-05-17 NOTE — PATIENT INSTRUCTIONS
1. Foot pain, bilateral    2. Arthritis of both feet    3. Plantar fasciitis of right foot      - Patient is following up for bilateral midfoot pain due to arthritis and right heel pain due to plantar fasciitis  -Patient states that her great toe pain is minimal at this time  -Patient has been using custom orthotics, performing home exercises regularly.  Patient reports pain mainly when she is not wearing her orthotics in her sneakers often at nighttime when she is at home walking around barefoot  -I recommend that she purchase cushioned slippers to wear at home.  I showed patient some options online  -Patient tolerated bilateral third and left fourth TMT joint cortisone injections today without complications.  Patient also tolerated right plantar fascia origin cortisone injection today without complications.  Patient was given postprocedure instructions  -Patient will follow-up when pain returns  -Call direct clinic number [[phone]] at any time with questions or concerns.    Albert Yeo MD [facility]  [facility] [place]

## 2024-05-17 NOTE — PROGRESS NOTES
ASSESSMENT & PLAN  Patient Instructions     1. Foot pain, bilateral    2. Arthritis of both feet    3. Plantar fasciitis of right foot      - Patient is following up for bilateral midfoot pain due to arthritis and right heel pain due to plantar fasciitis  -Patient states that her great toe pain is minimal at this time  -Patient has been using custom orthotics, performing home exercises regularly.  Patient reports pain mainly when she is not wearing her orthotics in her sneakers often at nighttime when she is at home walking around barefoot  -I recommend that she purchase cushioned slippers to wear at home.  I showed patient some options online  -Patient tolerated bilateral third and left fourth TMT joint cortisone injections today without complications.  Patient also tolerated right plantar fascia origin cortisone injection today without complications.  Patient was given postprocedure instructions  -Patient will follow-up when pain returns  -Call direct clinic number [[phone]] at any time with questions or concerns.    Albert Yeo MD [facility]  [facility]        -----    SUBJECTIVE:  Triny Florian is a 54 year old female who is seen in follow-up for pain in feet.They were last seen 12/20/2023 .     Since their last visit reports 100% improvement from injection but pain returned  about 1 month ago . They indicate that their current pain level is 8/10. They have tried home exercises, physical therapy (12 visits), and corticosteroid injection (most recent date: 12/20/23) that provided  4 month(s) of relief.      The patient is seen by themselves.    Patient's past medical, surgical, social, and family histories were reviewed today and no changes are noted.    REVIEW OF SYSTEMS:  Constitutional: NEGATIVE for fever, chills, change in weight  Skin: NEGATIVE for worrisome rashes, moles or lesions  GI/: NEGATIVE for bowel or bladder changes  Neuro: NEGATIVE  "for weakness, dizziness or paresthesias    OBJECTIVE:  /68   Ht 1.753 m (5' 9\")   Wt 68.9 kg (152 lb)   BMI 22.45 kg/m     General: healthy, alert and in no distress  HEENT: no scleral icterus or conjunctival erythema  Skin: no suspicious lesions or rash. No jaundice.  CV: regular rhythm by palpation, no pedal edema  Resp: normal respiratory effort without conversational dyspnea   Psych: normal mood and affect  Gait: normal steady gait with appropriate coordination and balance  Neuro: normal light touch sensory exam of the extremities.    MSK:  BILATERAL FOOT  Inspection:    no swelling over the dorsal midfoot  Palpation:    Tender about the 3rd, left 4th TMT joints     Tenderness over the right plantar fascia origin  Range of Motion:     Active toe flexion and extension  - afrom    Active ankle range of motion - afrom    Passive ankle range of motion - from  Strength:    Intrinsic foot musculature strength - grossly intact    Ankle strength - grossly intact  Special Tests:    Positive: none        Independent visualization of the below image:    Small Joint Injection/Arthrocentesis: bilateral intertarsal    Date/Time: 5/17/2024 8:30 AM    Performed by: Yeo, Albert, MD  Authorized by: Yeo, Albert, MD  Indications:  Pain  Needle Size:  25 G  Guidance: ultrasound     Approach:  Dorsal  Location:  Foot   Location comment:  Bilateral 3rd TMT Joints  Laterality:  Bilateral  Site:  Bilateral intertarsal  Medications (Right):  20 mg methylPREDNISolone 40 MG/ML; 0.5 mL ROPivacaine 5 MG/ML        Medications (Left):  20 mg methylPREDNISolone 40 MG/ML; 0.5 mL ROPivacaine 5 MG/ML                Outcome:  Tolerated well, no immediate complications  Procedure discussed: discussed risks, benefits, and alternatives    Consent Given by:  Patient  Timeout: timeout called immediately prior to procedure    Prep: patient was prepped and draped in usual sterile fashion       Ultrasound was used to ensure safe and accurate " needle placement and injection. Ultrasound images of the procedure were permanently stored.      Small Joint Injection/Arthrocentesis: L intertarsal    Date/Time: 5/17/2024 8:32 AM    Performed by: Yeo, Albert, MD  Authorized by: Yeo, Albert, MD  Indications:  Pain  Needle Size:  25 G  Guidance: ultrasound     Approach:  Dorsal  Location:  Foot   Location comment:  Left 4th TMT Joint    Site:  L intertarsal                  Medications:  0.5 mL ROPivacaine 5 MG/ML; 40 mg methylPREDNISolone 40 MG/ML        Outcome:  Tolerated well, no immediate complications  Procedure discussed: discussed risks, benefits, and alternatives    Consent Given by:  Patient  Timeout: timeout called immediately prior to procedure    Prep: patient was prepped and draped in usual sterile fashion       Ultrasound was used to ensure safe and accurate needle placement and injection. Ultrasound images of the procedure were permanently stored.      Right Foot - Plantar Fascia Cortisone Injection    Date/Time: 5/17/2024 8:33 AM    Performed by: Yeo, Albert, MD  Authorized by: Yeo, Albert, MD  Indications:  Pain  Needle Size:  25 G  Guidance: ultrasound     Approach:  Plantar  Location:  Foot   Location comment:  Right Plantar Fascia                      Medications:  40 mg methylPREDNISolone 40 MG/ML; 0.5 mL ROPivacaine 5 MG/ML        Outcome:  Tolerated well, no immediate complications  Procedure discussed: discussed risks, benefits, and alternatives    Consent Given by:  Patient  Timeout: timeout called immediately prior to procedure    Prep: patient was prepped and draped in usual sterile fashion       Ultrasound was used to ensure safe and accurate needle placement and injection. Ultrasound images of the procedure were permanently stored.          Patient's conditions were thoroughly discussed during today's visit with total time spent face-to-face with the patient and documentation being 25 minutes.    Albert Yeo MD, [facility]

## 2024-05-17 NOTE — LETTER
5/17/2024         RE: Triny Florian  [address] [id number]        Dear Colleague,    Thank you for referring your patient, Triny Florian, to the [facility] [place]. Please see a copy of my visit note below.    ASSESSMENT & PLAN  Patient Instructions     1. Foot pain, bilateral    2. Arthritis of both feet    3. Plantar fasciitis of right foot      - Patient is following up for bilateral midfoot pain due to arthritis and right heel pain due to plantar fasciitis  -Patient states that her great toe pain is minimal at this time  -Patient has been using custom orthotics, performing home exercises regularly.  Patient reports pain mainly when she is not wearing her orthotics in her sneakers often at nighttime when she is at home walking around barefoot  -I recommend that she purchase cushioned slippers to wear at home.  I showed patient some options online  -Patient tolerated bilateral third and left fourth TMT joint cortisone injections today without complications.  Patient also tolerated right plantar fascia origin cortisone injection today without complications.  Patient was given postprocedure instructions  -Patient will follow-up when pain returns  -Call direct clinic number [[phone]] at any time with questions or concerns.    Albert Yeo MD [facility]  [facility] [place]        -----    SUBJECTIVE:  Triny Florian is a 54 year old female who is seen in follow-up for pain in feet.They were last seen 12/20/2023 .     Since their last visit reports 100% improvement from injection but pain returned  about 1 month ago . They indicate that their current pain level is 8/10. They have tried home exercises, physical therapy (12 visits), and corticosteroid injection (most recent date: 12/20/23) that provided  4 month(s) of relief.      The patient is seen by themselves.    Patient's past medical, surgical,  "social, and family histories were reviewed today and no changes are noted.    REVIEW OF SYSTEMS:  Constitutional: NEGATIVE for fever, chills, change in weight  Skin: NEGATIVE for worrisome rashes, moles or lesions  GI/: NEGATIVE for bowel or bladder changes  Neuro: NEGATIVE for weakness, dizziness or paresthesias    OBJECTIVE:  /68   Ht 1.753 m (5' 9\")   Wt 68.9 kg (152 lb)   BMI 22.45 kg/m     General: healthy, alert and in no distress  HEENT: no scleral icterus or conjunctival erythema  Skin: no suspicious lesions or rash. No jaundice.  CV: regular rhythm by palpation, no pedal edema  Resp: normal respiratory effort without conversational dyspnea   Psych: normal mood and affect  Gait: normal steady gait with appropriate coordination and balance  Neuro: normal light touch sensory exam of the extremities.    MSK:  BILATERAL FOOT  Inspection:    no swelling over the dorsal midfoot  Palpation:    Tender about the 3rd, left 4th TMT joints     Tenderness over the right plantar fascia origin  Range of Motion:     Active toe flexion and extension  - afrom    Active ankle range of motion - afrom    Passive ankle range of motion - from  Strength:    Intrinsic foot musculature strength - grossly intact    Ankle strength - grossly intact  Special Tests:    Positive: none        Independent visualization of the below image:    Small Joint Injection/Arthrocentesis: bilateral intertarsal    Date/Time: 5/17/2024 8:30 AM    Performed by: Yeo, Albert, MD  Authorized by: Yeo, Albert, MD  Indications:  Pain  Needle Size:  25 G  Guidance: ultrasound     Approach:  Dorsal  Location:  Foot   Location comment:  Bilateral 3rd TMT Joints  Laterality:  Bilateral  Site:  Bilateral intertarsal  Medications (Right):  20 mg methylPREDNISolone 40 MG/ML; 0.5 mL ROPivacaine 5 MG/ML        Medications (Left):  20 mg methylPREDNISolone 40 MG/ML; 0.5 mL ROPivacaine 5 MG/ML                Outcome:  Tolerated well, no immediate " complications  Procedure discussed: discussed risks, benefits, and alternatives    Consent Given by:  Patient  Timeout: timeout called immediately prior to procedure    Prep: patient was prepped and draped in usual sterile fashion       Ultrasound was used to ensure safe and accurate needle placement and injection. Ultrasound images of the procedure were permanently stored.      Small Joint Injection/Arthrocentesis: L intertarsal    Date/Time: 5/17/2024 8:32 AM    Performed by: Yeo, Albert, MD  Authorized by: Yeo, Albert, MD  Indications:  Pain  Needle Size:  25 G  Guidance: ultrasound     Approach:  Dorsal  Location:  Foot   Location comment:  Left 4th TMT Joint    Site:  L intertarsal                  Medications:  0.5 mL ROPivacaine 5 MG/ML; 40 mg methylPREDNISolone 40 MG/ML        Outcome:  Tolerated well, no immediate complications  Procedure discussed: discussed risks, benefits, and alternatives    Consent Given by:  Patient  Timeout: timeout called immediately prior to procedure    Prep: patient was prepped and draped in usual sterile fashion       Ultrasound was used to ensure safe and accurate needle placement and injection. Ultrasound images of the procedure were permanently stored.      Right Foot - Plantar Fascia Cortisone Injection    Date/Time: 5/17/2024 8:33 AM    Performed by: Yeo, Albert, MD  Authorized by: Yeo, Albert, MD  Indications:  Pain  Needle Size:  25 G  Guidance: ultrasound     Approach:  Plantar  Location:  Foot   Location comment:  Right Plantar Fascia                      Medications:  40 mg methylPREDNISolone 40 MG/ML; 0.5 mL ROPivacaine 5 MG/ML        Outcome:  Tolerated well, no immediate complications  Procedure discussed: discussed risks, benefits, and alternatives    Consent Given by:  Patient  Timeout: timeout called immediately prior to procedure    Prep: patient was prepped and draped in usual sterile fashion       Ultrasound was used to ensure safe and accurate needle  placement and injection. Ultrasound images of the procedure were permanently stored.          Patient's conditions were thoroughly discussed during today's visit with total time spent face-to-face with the patient and documentation being 25 minutes.    Albert Yeo MD, [facility]          Again, thank you for allowing me to participate in the care of your patient.        Sincerely,        Albert Yeo, MD

## 2024-05-30 ENCOUNTER — MYC MEDICAL ADVICE (OUTPATIENT)
Dept: FAMILY MEDICINE | Facility: CLINIC | Age: 54
End: 2024-05-30
Payer: COMMERCIAL

## 2024-05-30 ENCOUNTER — PATIENT OUTREACH (OUTPATIENT)
Dept: CARE COORDINATION | Facility: CLINIC | Age: 54
End: 2024-05-30
Payer: COMMERCIAL

## 2024-05-30 NOTE — TELEPHONE ENCOUNTER
DOD: would recommend eVisit if I thought you would prescribe but I suspect not.    I was bitten by a tick last weekend. It was brief and the tick did not have time to get engorged. Wondering if I should take doxy?     Marlys SIMON, BSN, PHN, RN  [facility]  [phone]

## 2024-06-05 ENCOUNTER — HOSPITAL ENCOUNTER (INPATIENT)
Facility: CLINIC | Age: 54
LOS: 1 days | Discharge: HOME | DRG: 644 | End: 2024-06-06
Attending: INTERNAL MEDICINE | Admitting: INTERNAL MEDICINE
Payer: COMMERCIAL

## 2024-06-05 ENCOUNTER — APPOINTMENT (OUTPATIENT)
Dept: CT IMAGING | Facility: CLINIC | Age: 54
DRG: 644 | End: 2024-06-05
Attending: EMERGENCY MEDICINE
Payer: COMMERCIAL

## 2024-06-05 ENCOUNTER — HOSPITAL ENCOUNTER (EMERGENCY)
Facility: CLINIC | Age: 54
Discharge: ANOTHER HOSPITAL | DRG: 644 | End: 2024-06-05
Attending: EMERGENCY MEDICINE | Admitting: EMERGENCY MEDICINE
Payer: COMMERCIAL

## 2024-06-05 VITALS
OXYGEN SATURATION: 94 % | SYSTOLIC BLOOD PRESSURE: 150 MMHG | DIASTOLIC BLOOD PRESSURE: 90 MMHG | WEIGHT: 148 LBS | RESPIRATION RATE: 16 BRPM | HEIGHT: 69 IN | HEART RATE: 95 BPM | TEMPERATURE: 97.1 F | BODY MASS INDEX: 21.92 KG/M2

## 2024-06-05 DIAGNOSIS — E27.2 ADDISONIAN CRISIS (H): Primary | ICD-10-CM

## 2024-06-05 DIAGNOSIS — R10.84 GENERALIZED ABDOMINAL PAIN: ICD-10-CM

## 2024-06-05 DIAGNOSIS — E27.2 ADDISONIAN CRISIS (H): ICD-10-CM

## 2024-06-05 DIAGNOSIS — E27.1 AUTOIMMUNE ADDISON'S DISEASE (H): ICD-10-CM

## 2024-06-05 DIAGNOSIS — R11.2 NAUSEA AND VOMITING, UNSPECIFIED VOMITING TYPE: ICD-10-CM

## 2024-06-05 LAB
ALBUMIN SERPL BCG-MCNC: 4.9 G/DL (ref 3.5–5.2)
ALP SERPL-CCNC: 120 U/L (ref 40–150)
ALT SERPL W P-5'-P-CCNC: 20 U/L (ref 0–50)
ANION GAP SERPL CALCULATED.3IONS-SCNC: 17 MMOL/L (ref 7–15)
AST SERPL W P-5'-P-CCNC: 25 U/L (ref 0–45)
BASOPHILS # BLD AUTO: 0.1 10E3/UL (ref 0–0.2)
BASOPHILS NFR BLD AUTO: 0 %
BILIRUB SERPL-MCNC: 0.8 MG/DL
BUN SERPL-MCNC: 15.6 MG/DL (ref 6–20)
CALCIUM SERPL-MCNC: 10.7 MG/DL (ref 8.6–10)
CHLORIDE SERPL-SCNC: 94 MMOL/L (ref 98–107)
CREAT SERPL-MCNC: 1.02 MG/DL (ref 0.51–0.95)
DEPRECATED HCO3 PLAS-SCNC: 23 MMOL/L (ref 22–29)
EGFRCR SERPLBLD CKD-EPI 2021: 64 ML/MIN/1.73M2
EOSINOPHIL # BLD AUTO: 0 10E3/UL (ref 0–0.7)
EOSINOPHIL NFR BLD AUTO: 0 %
ERYTHROCYTE [DISTWIDTH] IN BLOOD BY AUTOMATED COUNT: 11.5 % (ref 10–15)
GLUCOSE SERPL-MCNC: 111 MG/DL (ref 70–99)
HCT VFR BLD AUTO: 51.1 % (ref 35–47)
HGB BLD-MCNC: 17.8 G/DL (ref 11.7–15.7)
IMM GRANULOCYTES # BLD: 0 10E3/UL
IMM GRANULOCYTES NFR BLD: 0 %
LIPASE SERPL-CCNC: 44 U/L (ref 13–60)
LYMPHOCYTES # BLD AUTO: 1.1 10E3/UL (ref 0.8–5.3)
LYMPHOCYTES NFR BLD AUTO: 9 %
MAGNESIUM SERPL-MCNC: 1.8 MG/DL (ref 1.7–2.3)
MCH RBC QN AUTO: 30.8 PG (ref 26.5–33)
MCHC RBC AUTO-ENTMCNC: 34.8 G/DL (ref 31.5–36.5)
MCV RBC AUTO: 89 FL (ref 78–100)
MONOCYTES # BLD AUTO: 0.5 10E3/UL (ref 0–1.3)
MONOCYTES NFR BLD AUTO: 4 %
NEUTROPHILS # BLD AUTO: 10.5 10E3/UL (ref 1.6–8.3)
NEUTROPHILS NFR BLD AUTO: 87 %
NRBC # BLD AUTO: 0 10E3/UL
NRBC BLD AUTO-RTO: 0 /100
PHOSPHATE SERPL-MCNC: 3.6 MG/DL (ref 2.5–4.5)
PLATELET # BLD AUTO: 289 10E3/UL (ref 150–450)
POTASSIUM SERPL-SCNC: 4.2 MMOL/L (ref 3.4–5.3)
PROT SERPL-MCNC: 8.8 G/DL (ref 6.4–8.3)
RBC # BLD AUTO: 5.77 10E6/UL (ref 3.8–5.2)
SODIUM SERPL-SCNC: 134 MMOL/L (ref 135–145)
TSH SERPL DL<=0.005 MIU/L-ACNC: 0.68 UIU/ML (ref 0.3–4.2)
WBC # BLD AUTO: 12.2 10E3/UL (ref 4–11)

## 2024-06-05 PROCEDURE — G0379 DIRECT REFER HOSPITAL OBSERV: HCPCS

## 2024-06-05 PROCEDURE — 258N000003 HC RX IP 258 OP 636: Performed by: EMERGENCY MEDICINE

## 2024-06-05 PROCEDURE — 258N000003 HC RX IP 258 OP 636: Performed by: NURSE PRACTITIONER

## 2024-06-05 PROCEDURE — 85025 COMPLETE CBC W/AUTO DIFF WBC: CPT | Performed by: EMERGENCY MEDICINE

## 2024-06-05 PROCEDURE — 96361 HYDRATE IV INFUSION ADD-ON: CPT

## 2024-06-05 PROCEDURE — 96375 TX/PRO/DX INJ NEW DRUG ADDON: CPT

## 2024-06-05 PROCEDURE — 84443 ASSAY THYROID STIM HORMONE: CPT | Performed by: EMERGENCY MEDICINE

## 2024-06-05 PROCEDURE — 82040 ASSAY OF SERUM ALBUMIN: CPT | Performed by: EMERGENCY MEDICINE

## 2024-06-05 PROCEDURE — 250N000011 HC RX IP 250 OP 636: Performed by: NURSE PRACTITIONER

## 2024-06-05 PROCEDURE — 96365 THER/PROPH/DIAG IV INF INIT: CPT | Mod: 59

## 2024-06-05 PROCEDURE — 99222 1ST HOSP IP/OBS MODERATE 55: CPT | Mod: FS | Performed by: INTERNAL MEDICINE

## 2024-06-05 PROCEDURE — 99285 EMERGENCY DEPT VISIT HI MDM: CPT | Mod: 25

## 2024-06-05 PROCEDURE — 84100 ASSAY OF PHOSPHORUS: CPT | Performed by: NURSE PRACTITIONER

## 2024-06-05 PROCEDURE — 36415 COLL VENOUS BLD VENIPUNCTURE: CPT | Performed by: NURSE PRACTITIONER

## 2024-06-05 PROCEDURE — 250N000011 HC RX IP 250 OP 636: Performed by: EMERGENCY MEDICINE

## 2024-06-05 PROCEDURE — 250N000009 HC RX 250: Performed by: EMERGENCY MEDICINE

## 2024-06-05 PROCEDURE — 83690 ASSAY OF LIPASE: CPT | Performed by: EMERGENCY MEDICINE

## 2024-06-05 PROCEDURE — 96374 THER/PROPH/DIAG INJ IV PUSH: CPT

## 2024-06-05 PROCEDURE — 83735 ASSAY OF MAGNESIUM: CPT | Performed by: NURSE PRACTITIONER

## 2024-06-05 PROCEDURE — 36415 COLL VENOUS BLD VENIPUNCTURE: CPT | Performed by: EMERGENCY MEDICINE

## 2024-06-05 PROCEDURE — 99207 PR APP CREDIT; MD BILLING SHARED VISIT: CPT | Performed by: NURSE PRACTITIONER

## 2024-06-05 PROCEDURE — 74177 CT ABD & PELVIS W/CONTRAST: CPT

## 2024-06-05 PROCEDURE — 96376 TX/PRO/DX INJ SAME DRUG ADON: CPT

## 2024-06-05 PROCEDURE — 96366 THER/PROPH/DIAG IV INF ADDON: CPT

## 2024-06-05 PROCEDURE — G0378 HOSPITAL OBSERVATION PER HR: HCPCS

## 2024-06-05 PROCEDURE — 120N000002 HC R&B MED SURG/OB UMMC

## 2024-06-05 RX ORDER — CALCIUM CARBONATE 500 MG/1
1000 TABLET, CHEWABLE ORAL 4 TIMES DAILY PRN
Status: DISCONTINUED | OUTPATIENT
Start: 2024-06-05 | End: 2024-06-06 | Stop reason: HOSPADM

## 2024-06-05 RX ORDER — LIDOCAINE 40 MG/G
CREAM TOPICAL
Status: DISCONTINUED | OUTPATIENT
Start: 2024-06-05 | End: 2024-06-06 | Stop reason: HOSPADM

## 2024-06-05 RX ORDER — FLUDROCORTISONE ACETATE 0.1 MG/1
.1-.3 TABLET ORAL DAILY PRN
Status: DISCONTINUED | OUTPATIENT
Start: 2024-06-05 | End: 2024-06-05

## 2024-06-05 RX ORDER — PROMETHAZINE HYDROCHLORIDE 25 MG/1
25 TABLET ORAL EVERY 6 HOURS PRN
Status: DISCONTINUED | OUTPATIENT
Start: 2024-06-05 | End: 2024-06-06 | Stop reason: HOSPADM

## 2024-06-05 RX ORDER — SODIUM CHLORIDE, SODIUM LACTATE, POTASSIUM CHLORIDE, CALCIUM CHLORIDE 600; 310; 30; 20 MG/100ML; MG/100ML; MG/100ML; MG/100ML
INJECTION, SOLUTION INTRAVENOUS CONTINUOUS
Status: DISCONTINUED | OUTPATIENT
Start: 2024-06-05 | End: 2024-06-05

## 2024-06-05 RX ORDER — LORAZEPAM 2 MG/ML
1 INJECTION INTRAMUSCULAR DAILY PRN
Status: DISCONTINUED | OUTPATIENT
Start: 2024-06-05 | End: 2024-06-06 | Stop reason: HOSPADM

## 2024-06-05 RX ORDER — LORAZEPAM 2 MG/ML
1 INJECTION INTRAMUSCULAR 2 TIMES DAILY PRN
Status: DISCONTINUED | OUTPATIENT
Start: 2024-06-05 | End: 2024-06-05

## 2024-06-05 RX ORDER — LORAZEPAM 2 MG/ML
1 INJECTION INTRAMUSCULAR ONCE
Status: COMPLETED | OUTPATIENT
Start: 2024-06-05 | End: 2024-06-05

## 2024-06-05 RX ORDER — AMOXICILLIN 250 MG
2 CAPSULE ORAL 2 TIMES DAILY PRN
Status: DISCONTINUED | OUTPATIENT
Start: 2024-06-05 | End: 2024-06-06 | Stop reason: HOSPADM

## 2024-06-05 RX ORDER — AMOXICILLIN 250 MG
1 CAPSULE ORAL 2 TIMES DAILY PRN
Status: DISCONTINUED | OUTPATIENT
Start: 2024-06-05 | End: 2024-06-06 | Stop reason: HOSPADM

## 2024-06-05 RX ORDER — SODIUM CHLORIDE, SODIUM LACTATE, POTASSIUM CHLORIDE, CALCIUM CHLORIDE 600; 310; 30; 20 MG/100ML; MG/100ML; MG/100ML; MG/100ML
INJECTION, SOLUTION INTRAVENOUS CONTINUOUS
Status: DISPENSED | OUTPATIENT
Start: 2024-06-05 | End: 2024-06-06

## 2024-06-05 RX ORDER — IBUPROFEN 200 MG
200 TABLET ORAL EVERY 4 HOURS PRN
COMMUNITY

## 2024-06-05 RX ORDER — HYDROMORPHONE HYDROCHLORIDE 1 MG/ML
0.5 INJECTION, SOLUTION INTRAMUSCULAR; INTRAVENOUS; SUBCUTANEOUS
Status: DISCONTINUED | OUTPATIENT
Start: 2024-06-05 | End: 2024-06-05 | Stop reason: HOSPADM

## 2024-06-05 RX ORDER — PROCHLORPERAZINE 25 MG
25 SUPPOSITORY, RECTAL RECTAL EVERY 12 HOURS PRN
Status: DISCONTINUED | OUTPATIENT
Start: 2024-06-05 | End: 2024-06-06 | Stop reason: HOSPADM

## 2024-06-05 RX ORDER — IOPAMIDOL 755 MG/ML
74 INJECTION, SOLUTION INTRAVASCULAR ONCE
Status: COMPLETED | OUTPATIENT
Start: 2024-06-05 | End: 2024-06-05

## 2024-06-05 RX ADMIN — HYDROCORTISONE SODIUM SUCCINATE 100 MG: 100 INJECTION, POWDER, FOR SOLUTION INTRAMUSCULAR; INTRAVENOUS at 09:56

## 2024-06-05 RX ADMIN — SODIUM CHLORIDE, POTASSIUM CHLORIDE, SODIUM LACTATE AND CALCIUM CHLORIDE: 600; 310; 30; 20 INJECTION, SOLUTION INTRAVENOUS at 18:03

## 2024-06-05 RX ADMIN — IOPAMIDOL 74 ML: 755 INJECTION, SOLUTION INTRAVENOUS at 12:25

## 2024-06-05 RX ADMIN — SODIUM CHLORIDE 1000 ML: 9 INJECTION, SOLUTION INTRAVENOUS at 09:55

## 2024-06-05 RX ADMIN — HYDROMORPHONE HYDROCHLORIDE 0.5 MG: 1 INJECTION, SOLUTION INTRAMUSCULAR; INTRAVENOUS; SUBCUTANEOUS at 11:49

## 2024-06-05 RX ADMIN — LORAZEPAM 1 MG: 2 INJECTION INTRAMUSCULAR; INTRAVENOUS at 21:44

## 2024-06-05 RX ADMIN — LORAZEPAM 1 MG: 2 INJECTION INTRAMUSCULAR; INTRAVENOUS at 11:52

## 2024-06-05 RX ADMIN — SODIUM CHLORIDE 61 ML: 9 INJECTION, SOLUTION INTRAVENOUS at 12:25

## 2024-06-05 RX ADMIN — HYDROCORTISONE SODIUM SUCCINATE 50 MG: 100 INJECTION, POWDER, FOR SOLUTION INTRAMUSCULAR; INTRAVENOUS at 17:51

## 2024-06-05 RX ADMIN — PROMETHAZINE HYDROCHLORIDE 25 MG: 25 INJECTION INTRAMUSCULAR; INTRAVENOUS at 10:57

## 2024-06-05 RX ADMIN — LORAZEPAM 1 MG: 2 INJECTION INTRAMUSCULAR; INTRAVENOUS at 10:08

## 2024-06-05 RX ADMIN — PROCHLORPERAZINE EDISYLATE 10 MG: 5 INJECTION INTRAMUSCULAR; INTRAVENOUS at 21:42

## 2024-06-05 RX ADMIN — SODIUM CHLORIDE 1000 ML: 9 INJECTION, SOLUTION INTRAVENOUS at 11:26

## 2024-06-05 ASSESSMENT — ACTIVITIES OF DAILY LIVING (ADL)
ADLS_ACUITY_SCORE: 18
ADLS_ACUITY_SCORE: 35
ADLS_ACUITY_SCORE: 19
ADLS_ACUITY_SCORE: 35
ADLS_ACUITY_SCORE: 19
ADLS_ACUITY_SCORE: 35
ADLS_ACUITY_SCORE: 18
ADLS_ACUITY_SCORE: 35
ADLS_ACUITY_SCORE: 35
ADLS_ACUITY_SCORE: 18
ADLS_ACUITY_SCORE: 18
ADLS_ACUITY_SCORE: 35

## 2024-06-05 NOTE — PHARMACY-ADMISSION MEDICATION HISTORY
"Pharmacy Intern Admission Medication History    Admission medication history is complete. The information provided in this note is only as accurate as the sources available at the time of the update.    Information Source(s): Patient and CareEverywhere/SureScripts via in-person    Pertinent Information:   Patient reported taking morning meds, but threw up after taking them.  Patient reported taking \"a bunch\" of mirtazapine last night because she wanted to fall asleep.     Changes made to PTA medication list:  Added: Ibuprofen and docusate sodium  Deleted: Zolpidem  Changed: None    Allergies reviewed with patient and updates made in EHR: yes    Medication History Completed By: Cindy Moura 6/5/2024 1:17 PM    Current Facility-Administered Medications for the 6/5/24 encounter (Hospital Encounter)   Medication    0.5 mL ropivacaine (NAROPIN) injection 5 mg/mL    0.5 mL ropivacaine (NAROPIN) injection 5 mg/mL    0.5 mL ropivacaine (NAROPIN) injection 5 mg/mL    0.5 mL ropivacaine (NAROPIN) injection 5 mg/mL    0.5 mL ropivacaine (NAROPIN) injection 5 mg/mL    0.5 mL ropivacaine (NAROPIN) injection 5 mg/mL    1 mL ropivacaine (NAROPIN) injection 5 mg/mL    lidocaine (PF) (XYLOCAINE) 1 % injection 0.5 mL    lidocaine (PF) (XYLOCAINE) 1 % injection 0.5 mL    lidocaine (PF) (XYLOCAINE) 1 % injection 0.5 mL    lidocaine (PF) (XYLOCAINE) 1 % injection 0.5 mL    lidocaine 1 % injection 0.5 mL    lidocaine 1 % injection 0.5 mL    lidocaine 1 % injection 0.5 mL    methylPREDNISolone (DEPO-Medrol) injection 20 mg    methylPREDNISolone (DEPO-Medrol) injection 20 mg    methylPREDNISolone (DEPO-Medrol) injection 20 mg    methylPREDNISolone (DEPO-MEDROL) injection 20 mg    methylPREDNISolone (DEPO-MEDROL) injection 20 mg    methylPREDNISolone (DEPO-MEDROL) injection 20 mg    methylPREDNISolone (DEPO-MEDROL) injection 20 mg    methylPREDNISolone (DEPO-Medrol) injection 40 mg    methylPREDNISolone (DEPO-Medrol) injection 40 mg    " "methylPREDNISolone (DEPO-Medrol) injection 40 mg    methylPREDNISolone (DEPO-Medrol) injection 40 mg    triamcinolone (KENALOG-40) injection 20 mg    triamcinolone (KENALOG-40) injection 20 mg    triamcinolone (KENALOG-40) injection 20 mg     PTA Med List   Medication Sig Last Dose    docusate sodium (COLACE) 50 MG capsule Take 50 mg by mouth as needed for constipation Unknown at PRN    fludrocortisone (FLORINEF) 0.1 MG tablet TAKE ONE TO THREE TABLETS BY MOUTH DAILY AS NEEDED 6/5/2024 at AM    ibuprofen (ADVIL/MOTRIN) 200 MG tablet Take 200 mg by mouth every 4 hours as needed for pain Unknown at PRN    levothyroxine (SYNTHROID/LEVOTHROID) 150 MCG tablet Take 1 tablet (150 mcg) by mouth daily 6/5/2024 at AM    mirtazapine (REMERON) 7.5 MG tablet Take 1 tablet (7.5 mg) by mouth at bedtime 6/4/2024 at PM- \"took a bunch\"    predniSONE (DELTASONE) 5 MG tablet TAKE ONE TO ONE AND ONE-HALF TABLETS BY MOUTH DAILY 6/5/2024 at AM    promethazine (PHENERGAN) 25 MG tablet Take 1 tablet (25 mg) by mouth every 6 hours as needed for nausea 6/4/2024      "

## 2024-06-05 NOTE — PROGRESS NOTES
"Transfer Type: [facility]  Transfer Triage Note    Date of call: 06/05/24  Time of call: 2:29 PM    Current Patient Location:  [facility] ER  Current Level of Care: ED    Vitals: Temp: 97.1  F (36.2  C) Temp src: Temporal (pt retching) BP: (!) 156/101 Pulse: 98   Resp: 20 SpO2: 94 % Height: 175.3 cm (5' 9\") Weight: 67.1 kg (148 lb)  O2 Device: None (Room air) at    Diagnosis: Chickasaw's Crisis  Reason for requested transfer: Further diagnostic work up, management, and consultation for specialized care   Isolation Needs: None    Care everywhere has been updated and reviewed: NA  Necessary images have been sent through PACS: NA    If patient is transferring for specialty care or specific procedure, the specialist required has participated in the transfer call and agreed with need for transfer and anticipated timeline: No    Transfer accepted: Yes  Stability of Patient: Patient is vitally stable, with no critical labs, and will likely remain stable throughout the transfer process  Is the patient appropriate for [facility]? 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 Received 100 mg of Solucortef and phenergan (her antiemetic of choice--allergies to zofran and reglan) and is feeling better but not well enough to go home. Hospitalist at [facility] declined admission due to lack of endocrinology support.     Accepted for  bed. Likely " could be obs status.         Titus Gardiner MD

## 2024-06-05 NOTE — H&P
[facility]    History and Physical - Hospitalist Service       Date of Admission:  6/5/24    Assessment & Plan      Triny Florian is a 54 year old female with past medical history significant for [place]'s disease and hypothyroidism who initially presented to [facility] with nausea, vomiting, and abdominal pain concerning for adrenal crisis.  She is transferred to [facility] for further management and Endocrine consultation.    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  Seen by Endocrinology in the past but her PCP Dr. Mosher has managed her medications for many years.  On prednisone 5-7.5mg daily PTA and fludrocortisone 0.1-0.3mg daily as needed.  Last crisis more than 10 years ago.  On admission, Na slightly low at 134, K wnl, Cr 1.02, Ca2+ 10.7, .  TSH wnl.  WBC 12.2, Hgb 17.8, hematocrit 51.1, ANC 10.5, suspect hemoconcentration.  CT abdomen pelvis unremarkable; scattered diverticulosis noted but no e/o acute diverticulitis.  Received hydrocortisone 100mg IV x 1, ativan, dilaudid, and phenergan.  Hypertensive on assessment at [facility]. Baseline SBP 90-110s.   - Endocrine consult    - Check UA/UC   - Monitor closely for fevers, hypotension    - Vitals Q4H for now   - Hydrocortisone 50mg IV Q6H for now pending Endocrine recs   - Anti-emetics (in order of preference):    - Compazine 5-10mg IV Q6H PRN    - Phenergan 25mg PO Q6H PRN    - Compazine 25mg [place] Q12H PRN   - Can offer IV phenergan if symptoms refractory to above regimen but would prefer to avoid given risk for thrombosis and tissue necrosis   - LR 75cc/hr x 12 hours, resume if vomiting, persistent hypotension  - Check Mag and Phos now given GI losses   - CMP, CBC, Mag, Phos, Ca2+ in AM     # Hypothyroidism   TSH this admission  "0.68.  On levothyroxine 150mcg PTA. Decreased from 175mcg about 3 months ago.  Has noticed increased fatigue and working with outpatient provider     - Continue PTA levothyroxine   - Recheck TFTs in 4-6 weeks with PCP         Diet:  Regular  DVT Prophylaxis: Pneumatic Compression Devices  Gonzalez Catheter: Not present  Lines: None     Cardiac Monitoring: None  Code Status:  FULL     Clinically Significant Risk Factors Present on Admission           # Hypercalcemia: Highest Ca = 10.7 mg/dL in last 2 days, will monitor as appropriate                 Disposition Plan     Medically Ready for Discharge: Anticipated in 2-4 Days         The patient's care was discussed with the Attending Physician, Dr. Taty Groves .    Bettina Dorsey [facility]  Hospitalist [facility]  Securely message with [employer] info)  Text page via [facility] Paging/Directory     ______________________________________________________________________    Chief Complaint   \"I am doing better now\"    History is obtained from the patient and chart review.     History of Present Illness   Triny Florian is a 54 year old female with past medical history significant for [place]'s disease and hypothyroidism who initially presented to [facility] with nausea, vomiting, and abdominal pain concerning for adrenal crisis.  She is transferred to [facility] for further management and Endocrine consultation.    Tirny is resting in bed.  She reports feeling ill yesterday morning around 11am.  Attempted to go out with her daughter and have lunch and run errands and had 1-2 episodes of vomiting.  Had more vomiting last night and this morning with lower abdominal pain.  Denies diarrhea.  She attempted to take extra doses of her daily prednisone and florinef but vomited those up.  Likely that she has missed two full days of her steroids.  She denies chest pain, dyspnea, back pain, thigh pain, and dysuria.  " "      Past Medical History    Past Medical History:   Diagnosis Date    Acute bronchospasm 2/27/2020    Addisons disease (H)     Arthritis 2016    great toes, thumbs    CARDIOVASCULAR SCREENING; LDL GOAL LESS THAN 160 5/23/2011    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 central line placement when being diagnosed    Hepatitis 11/10/2006    History of blood transfusion 1998    Other diseases of lung, not elsewhere classified 3/21/2007    Overview:  pulm nodule: ANNAMARIA suprahilar 7 mm non calcified noted on CXR after TB exposure at work  CT scheduled-    Painful scar 5/23/2012    where feeding tube is    Thyroid disease 1995    hypothyroid       Past Surgical History   Past Surgical History:   Procedure Laterality Date    CHOLECYSTECTOMY      COLONOSCOPY  2018    GYN SURGERY      ORTHOPEDIC SURGERY  2016    toe surgery       Prior to Admission Medications   Cannot display prior to admission medications because the patient has not been admitted in this contact.        Review of Systems    The 10 point Review of Systems is negative other than noted in the HPI or here.      Physical Exam   Vital signs:  Estimated body mass index is 21.86 kg/m  as calculated from the following:    Height as of an earlier encounter on 6/5/24: 1.753 m (5' 9\").    Weight as of an earlier encounter on 6/5/24: 67.1 kg (148 lb).    GENERAL: Alert and oriented x 3. Well nourished, well developed.  No acute distress.    HEENT: Normocephalic, atraumatic. Anicteric sclera. Mucous membranes moist.   CV: RRR. S1, S2. No murmurs appreciated.   RESPIRATORY: Effort normal on room air. Lungs CTAB with no wheezing, rales, or rhonchi.   GI: Abdomen soft and non distended, bowel sounds present x all 4 quadrants. No tenderness, rebound, or guarding.   NEUROLOGICAL: No focal deficits. Follows commands.  Strength equal in upper and lower extremities.   MUSCULOSKELETAL: No joint swelling or tenderness. Moves all extremities.   EXTREMITIES: No gross deformities. No peripheral " edema.   SKIN: Grossly warm, dry, and intact. No jaundice. No rashes.     Medical Decision Making       60 MINUTES SPENT BY ME on the date of service doing chart review, history, exam, documentation & further activities per the note.      Data     I have personally reviewed the following data over the past 24 hrs:    12.2 (H)  \   17.8 (H)   / 289     134 (L) 94 (L) 15.6 /  111 (H)   4.2 23 1.02 (H) \     ALT: 20 AST: 25 AP: 120 TBILI: 0.8   ALB: 4.9 TOT PROTEIN: 8.8 (H) LIPASE: 44     TSH: 0.68 T4: N/A A1C: N/A       Imaging results reviewed over the past 24 hrs:   Recent Results (from the past 24 hour(s))   CT Abdomen Pelvis w Contrast    Narrative    CT ABDOMEN/PELVIS WITH CONTRAST June 5, 2024 12:45 PM    CLINICAL HISTORY: Abdominal pain. Left lower quadrant abdominal pain.    TECHNIQUE: CT scan of the abdomen and pelvis was performed following  injection of IV contrast. Multiplanar reformats were obtained. Dose  reduction techniques were used.  CONTRAST: 74mL ISOVUE-370.    COMPARISON: None.    FINDINGS:   LOWER CHEST: No infiltrates or effusions.    HEPATOBILIARY: No significant mass or bile duct dilatation.  Cholecystectomy.     PANCREAS: No significant mass, duct dilatation, or inflammatory  change.    SPLEEN: Normal size.    ADRENAL GLANDS: No significant nodules.    KIDNEYS/BLADDER: No significant mass, stones, or hydronephrosis.    BOWEL: There is minimal scattered diverticulosis without evidence for  diverticulitis. There are no dilated loops of small intestine or large  bowel to suggest ileus or obstruction. Unremarkable appendix.    PELVIC ORGANS: No pelvic masses.    ADDITIONAL FINDINGS: No ascites.    MUSCULOSKELETAL: No frankly destructive bony lesions.      Impression    IMPRESSION: No acute process demonstrated.    ALAN GU MD         SYSTEM ID:  [id number]

## 2024-06-05 NOTE — ED TRIAGE NOTES
[facility]  ED Arrival Note    Arrives through triage. ABC's intact. A &O X4. . Pt arrives with c/o abdominal pain, N/V for the last 24 hrs. Hx of [place]'s disease. Patient concerned that she may be in a crisis.       Visitors during triage: None      Triage Interventions: Direct rooming     Ambulatory: Yes    Meets Stroke Criteria?: No    Meets Trauma Criteria?: No    Shock Index (HR/SBP): <0.8, for provider reference    Directed to: Triage Lobby    Pronouns: she/her       Triage Assessment (Adult)       Row Name 06/05/24 0909          Triage Assessment    Airway WDL WDL        Respiratory WDL    Respiratory WDL WDL        Skin Circulation/Temperature WDL    Skin Circulation/Temperature WDL WDL        Cardiac WDL    Cardiac WDL WDL        Peripheral/Neurovascular WDL    Peripheral Neurovascular WDL WDL        Cognitive/Neuro/Behavioral WDL    Cognitive/Neuro/Behavioral WDL WDL

## 2024-06-05 NOTE — PROGRESS NOTES
Brief endocrine recommendations note    Patient is 54-year-old female with history of Revere's disease and hypothyroidism who presented to [place] ER with complaints of nausea, abdominal pain and vomiting and concern for Revere's crisis similar to her previous adrenal crisis.  She was treated with Solu-Cortef 100 mg in [facility] ER and was transferred to [facility] due to lack of endocrinology support at her [facility].  At [address] [place] [address] patient was found to have blood pressure of 156/101 with pulse rate normal at 98, respiration normal at 20, saturating 94% at room air.  Endocrinology is consulted for management of suspected adrenal crisis in this patient.  From chart review seems patient has a history of Bernard's disease since 1998 she is on prednisone 5 mg daily and fludrocortisone 0.1 mg daily.  She has not seen her endocrinologist for years and is being managed by primary care physician.    Vital stable here as well.  Labs reviewed.  Sodium-mildly low at 134, potassium normal at 4.2, calcium elevated at 10.7, creatinine elevated at 1.02, TSH normal 8.68 mild leukocytosis at 12.2 with hemoglobin elevation at 17.8.    Plan:  #Adrenal crisis concerns  # Nausea, vomiting and abdominal pain.    - Start on hydrocortisone 50 mg every 8 hours today, if clinically continue to improve decrease hydrocortisone to 25 mg every 8 hour son 06/06/24 and then back to home dose prednisone on 06/07/24 and fludrocortisone 0.1 mg daily.  - At this time, higher dose of hydrocortisone will provide mineralocorticoid effect.  - Monitor clinically, continue to monitor vitals and labs.  - Patient will be examined tomorrow and full consult note will be placed tommorow.    Plan discussed with attending Dr. Tran.    [place]  PGY-4  Endocrine fellow

## 2024-06-05 NOTE — ED PROVIDER NOTES
"  Emergency Department Note      History of Present Illness     Chief Complaint  Abdominal Pain and Nausea & Vomiting    HPI  Triny Florian is a 54 year old female with history of Keo's disease and cholecystectomy who presents to the ED for evaluation of abdominal pain. The patient reports she developed lower abdominal pain, nausea, and vomiting yesterday which has persisted. She notes concern for Bernard's crisis. The patient denies fevers, diarrhea, constipation, cough, congestion, or urinary symptoms.     Independent Historian  None    Review of External Notes  None  Past Medical History   Medical History and Problem List  Past Medical History:   Diagnosis Date    Acute bronchospasm 2/27/2020    Addisons disease (H)     Arthritis 2016    CARDIOVASCULAR SCREENING; LDL GOAL LESS THAN 160 5/23/2011    Endocarditis     Hepatitis 11/10/2006    History of blood transfusion 1998    Other diseases of lung, not elsewhere classified 3/21/2007    Painful scar 5/23/2012    Thyroid disease 1995     Medications  docusate sodium (COLACE) 50 MG capsule  fludrocortisone (FLORINEF) 0.1 MG tablet  ibuprofen (ADVIL/MOTRIN) 200 MG tablet  levothyroxine (SYNTHROID/LEVOTHROID) 150 MCG tablet  mirtazapine (REMERON) 7.5 MG tablet  predniSONE (DELTASONE) 5 MG tablet  promethazine (PHENERGAN) 25 MG tablet  valACYclovir (VALTREX) 1000 mg tablet      Surgical History   Past Surgical History:   Procedure Laterality Date    CHOLECYSTECTOMY      COLONOSCOPY  2018    GYN SURGERY      ORTHOPEDIC SURGERY  2016    toe surgery     Physical Exam   Patient Vitals for the past 24 hrs:   BP Temp Temp src Pulse Resp SpO2 Height Weight   06/05/24 0911 (!) 156/101 -- -- -- -- -- -- --   06/05/24 0904 -- 97.1  F (36.2  C) Temporal 98 20 94 % 1.753 m (5' 9\") 67.1 kg (148 lb)     Physical Exam  Constitutional: Vital signs reviewed as above  General: Alert. Uncomfortable appearing.   HEENT: Moist mucous membranes  Eyes: Conjunctiva normal.   Neck: " Normal range of motion  Cardiovascular: Regular rate, Regular rhythm and normal heart sounds.  No MRG  Pulmonary/Chest: Effort normal and breath sounds normal. No respiratory distress. Patient has no wheezes. Patient has no rales.   Abdominal: Soft. Positive bowel sounds. No MRG. Diffuse lower abdominal tenderness.  Musculoskeletal/Extremities: Full ROM.  Endo: No pitting edema  Neurological: Alert, no focal deficits.  Skin: Skin is warm and dry.   Psychiatric: Pleasant     Diagnostics   Lab Results   Labs Ordered and Resulted from Time of ED Arrival to Time of ED Departure   COMPREHENSIVE METABOLIC PANEL - Abnormal       Result Value    Sodium 134 (*)     Potassium 4.2      Carbon Dioxide (CO2) 23      Anion Gap 17 (*)     Urea Nitrogen 15.6      Creatinine 1.02 (*)     GFR Estimate 64      Calcium 10.7 (*)     Chloride 94 (*)     Glucose 111 (*)     Alkaline Phosphatase 120      AST 25      ALT 20      Protein Total 8.8 (*)     Albumin 4.9      Bilirubin Total 0.8     CBC WITH PLATELETS AND DIFFERENTIAL - Abnormal    WBC Count 12.2 (*)     RBC Count 5.77 (*)     Hemoglobin 17.8 (*)     Hematocrit 51.1 (*)     MCV 89      MCH 30.8      MCHC 34.8      RDW 11.5      Platelet Count 289      % Neutrophils 87      % Lymphocytes 9      % Monocytes 4      % Eosinophils 0      % Basophils 0      % Immature Granulocytes 0      NRBCs per 100 WBC 0      Absolute Neutrophils 10.5 (*)     Absolute Lymphocytes 1.1      Absolute Monocytes 0.5      Absolute Eosinophils 0.0      Absolute Basophils 0.1      Absolute Immature Granulocytes 0.0      Absolute NRBCs 0.0     LIPASE - Normal    Lipase 44     ROUTINE UA WITH MICROSCOPIC REFLEX TO CULTURE   TSH WITH FREE T4 REFLEX     Imaging  CT Abdomen Pelvis w Contrast   Preliminary Result   IMPRESSION: No acute process demonstrated.        Independent Interpretation  None  ED Course    Medications Administered  Medications   HYDROmorphone (PF) (DILAUDID) injection 0.5 mg (0.5 mg  Intravenous $Given 6/5/24 1149)   sodium chloride 0.9% BOLUS 1,000 mL (0 mLs Intravenous Stopped 6/5/24 1126)   promethazine (PHENERGAN) 25 mg in sodium chloride 0.9 % 55 mL intermittent infusion (25 mg Intravenous $Given 6/5/24 1057)   hydrocortisone sodium succinate PF (solu-CORTEF) injection 100 mg (100 mg Intravenous $Given 6/5/24 0956)   LORazepam (ATIVAN) injection 1 mg (1 mg Intravenous $Given 6/5/24 1008)   sodium chloride 0.9% BOLUS 1,000 mL (0 mLs Intravenous Stopped 6/5/24 1210)   LORazepam (ATIVAN) injection 1 mg (1 mg Intravenous $Given 6/5/24 1152)   iopamidol (ISOVUE-370) solution 74 mL (74 mLs Intravenous $Given 6/5/24 1225)   Saline flush (61 mLs Intravenous $Given 6/5/24 1225)     Discussion of Management  Admitting Hospitalist, Letty Castro under Dr. Lama    Social Determinants of Health adding to complexity of care  None    ED Course  ED Course as of 06/05/24 1424   Wed Jun 05, 2024   0934 Initial Examination   1325 Rechecked patient. Feeling improved   1352 Discussed patient with GABRIELLA Francisco hospitalist     Medical Decision Making / Diagnosis   CMS Diagnoses: None    MIPS: None    [facility]  Triny Florian is a 54 year old female with a history of autoimmune [place]'s disease and hypothyroidism who presents with nausea, vomiting and diffuse abdominal pain as detailed above.  She immediately states this is consistent with her addisonian crisis.  Unfortunately she has many allergies.  She was given Phenergan is at the antiemetic she is able to take.  She was also given 100 of Solu-Cortef IV.  Ultimately she was given a couple doses of Ativan and some Dilaudid as well and she is finally resting comfortably.  She did have multiple rounds of emesis while here.  Her abdominal exam was tender diffusely throughout the lower abdomen and a CT scan was obtained which not show any acute process.  After several hours she is finally just started to feel somewhat comfortable.  Labs were significant only  for a slightly elevated white blood count at 12.2 which is likely secondary to her vomiting.  She has minor electrolyte abnormalities but nothing of major concern.  Lipase and liver tests were normal.  She received a couple liters of fluid on top of everything else here.  I have added on a TSH and T4.  Plan this point will be to admit for continued symptomatic cares, IV antiemetics and steroids.    I discussed the case with our hospitalist service who recommends transfer to the [facility] as we do not have endocrinology consult service here at [facility].  I discussed the case with the patient who agrees with the plan.  At this point she is stable, her nausea and pain are under control.  To be transported to the [facility] once they are ready for her.    Disposition  The patient was transferred to [facility]    ICD-10 Codes:    ICD-10-CM    1. Addisonian crisis (H24)  E27.2       2. Nausea and vomiting, unspecified vomiting type  R11.2       3. Generalized abdominal pain  R10.84          Discharge Medications  New Prescriptions    No medications on file     Scribe Disclosure:  I, Pako Garrido, am serving as a scribe at 9:16 AM on 6/5/2024 to document services personally performed by Rene Robertson MD based on my observations and the provider's statements to me.        Rene Robertson MD  06/05/24 4814

## 2024-06-05 NOTE — ED NOTES
[facility]  ED Nurse Handoff Report    ED Chief complaint: Abdominal Pain and Nausea & Vomiting      ED Diagnosis:   Final diagnoses:   Addisonian crisis (H24)   Nausea and vomiting, unspecified vomiting type   Generalized abdominal pain       Code Status:  Admitting MD to assess.      Allergies:   Allergies   Allergen Reactions    Haloperidol Anaphylaxis    Erythromycin     Haloperidol Swelling    Metoclopramide Other (See Comments)     facial twitch    Ondansetron Nausea and Vomiting    Reglan [Metoclopramide]     Sulfa Antibiotics     Zofran [Ondansetron Hcl-Dextrose]        Patient Story: Arrives through triage. ABC's intact. A &O X4. . Pt arrives with c/o abdominal pain, N/V for the last 24 hrs. Hx of Bernard's disease. Patient concerned that she may be in a crisis.   Focused Assessment:  Patient is alert and oriented x 4, calm and cooperative. VS are stable, skin is warm and dry, respirations are even and non-labored on room air. Patient denied chest pain, shortness of breath, dizziness, and nausea.       Treatments and/or interventions provided:   Medications   HYDROmorphone (PF) (DILAUDID) injection 0.5 mg (0.5 mg Intravenous $Given 6/5/24 1149)   sodium chloride 0.9% BOLUS 1,000 mL (0 mLs Intravenous Stopped 6/5/24 1126)   promethazine (PHENERGAN) 25 mg in sodium chloride 0.9 % 55 mL intermittent infusion (25 mg Intravenous $Given 6/5/24 1057)   hydrocortisone sodium succinate PF (solu-CORTEF) injection 100 mg (100 mg Intravenous $Given 6/5/24 0956)   LORazepam (ATIVAN) injection 1 mg (1 mg Intravenous $Given 6/5/24 1008)   sodium chloride 0.9% BOLUS 1,000 mL (0 mLs Intravenous Stopped 6/5/24 1210)   LORazepam (ATIVAN) injection 1 mg (1 mg Intravenous $Given 6/5/24 1152)   iopamidol (ISOVUE-370) solution 74 mL (74 mLs Intravenous $Given 6/5/24 1225)   Saline flush (61 mLs Intravenous $Given 6/5/24 1225)       Patient's response to treatments and/or interventions: Pain improved to 2/10,  "patient is resting in bed.     To be done/followed up on inpatient unit:  Monitor    Does this patient have any cognitive concerns?:  None    Activity level - Baseline/Home:  Independent  Activity Level - Current:   Independent    Patient's Preferred language: English   Needed?: No    Isolation: None  Infection: Not Applicable  Patient tested for COVID 19 prior to admission: NO  Bariatric?: No    Vital Signs:   Vitals:    06/05/24 0904 06/05/24 0911   BP:  (!) 156/101   Pulse: 98    Resp: 20    Temp: 97.1  F (36.2  C)    TempSrc: Temporal    SpO2: 94%    Weight: 67.1 kg (148 lb)    Height: 1.753 m (5' 9\")        Cardiac Rhythm:     Was the PSS-3 completed:   Yes  What interventions are required if any?               Family Comments: Son is at the bedside.   OBS brochure/video discussed/provided to patient/family: Yes              Name of person given brochure if not patient: NA              Relationship to patient: NA    For the majority of the shift this patient's behavior was Green.   Behavioral interventions performed were  information and reassurance provided.  .    ED NURSE PHONE NUMBER: [phone]           "

## 2024-06-05 NOTE — PLAN OF CARE
"Goal Outcome Evaluation:    VS: Blood pressure 97/54, pulse 68, temperature 97.5  F (36.4  C), temperature source Oral, resp. rate 16, height 1.753 m (5' 9\"), weight 67.1 kg (147 lb 14.9 oz), SpO2 100%, not currently breastfeeding.    O2: 100% on RA.    Neuro: A&Ox4. Denies numbness and tingling.    Bowel/Bladder: Contin. B&B.    LBM: 6/4/2024 per pt.    Diet: Regular. Poor appetite w abdominal pain.    Skin: Intact. Slight bruising on L shin pt reports from mountain biking but no concerns.    Pain: Pt rating abdominal pain 2/10 currently.    Activity: Independent.    Dressings: N/A.    LDA: L PIV infusing LR at 75mL/hr.    Equipment: IV pole. Personal belongings.    Plan: Continue POC.    Additional Info: Pt reported that previously when experiencing Addisonian crisis PRN Ativan helped with stomach pains. Provider paged and ordered PRN dose if necessary.     UA ordered to be collected (pt did not void on this shift since admission).     Gucci Ortega RN   "

## 2024-06-05 NOTE — PROGRESS NOTES
8MS ADMISSION    D: Patient admitted/transferred from [facility] via self transfer for Addisonian crisis.     I: Upon arrival to the unit patient was oriented to room, unit, and call light. Patient s height, weight, and vital signs were obtained. Allergies reviewed and allergy band applied. Provider notified of patient s arrival on the unit. Adult AVS completed. Head to toe assessment completed. Education assessment completed. Care plan initiated.    A: Vital signs stable upon admission. Patient rates pain at dull stomach pain 2/10. Two person skin check completed Jens ZHONG. Significant Skin Findings include minor bruising on left shine from mountain biking recently but no concern.    P: Continue to monitor patient s pain level and intervene as needed. Continue with plan of care. Notify provider with any concerns or changes in patient status.

## 2024-06-06 VITALS
RESPIRATION RATE: 18 BRPM | SYSTOLIC BLOOD PRESSURE: 93 MMHG | TEMPERATURE: 97 F | HEART RATE: 76 BPM | HEIGHT: 69 IN | OXYGEN SATURATION: 94 % | WEIGHT: 147.93 LBS | DIASTOLIC BLOOD PRESSURE: 48 MMHG | BODY MASS INDEX: 21.91 KG/M2

## 2024-06-06 LAB
ALBUMIN SERPL BCG-MCNC: 3.2 G/DL (ref 3.5–5.2)
ALBUMIN UR-MCNC: NEGATIVE MG/DL
ALP SERPL-CCNC: 77 U/L (ref 40–150)
ALT SERPL W P-5'-P-CCNC: 12 U/L (ref 0–50)
ANION GAP SERPL CALCULATED.3IONS-SCNC: 7 MMOL/L (ref 7–15)
APPEARANCE UR: CLEAR
AST SERPL W P-5'-P-CCNC: 17 U/L (ref 0–45)
BILIRUB SERPL-MCNC: 0.3 MG/DL
BILIRUB UR QL STRIP: NEGATIVE
BUN SERPL-MCNC: 15.3 MG/DL (ref 6–20)
CALCIUM SERPL-MCNC: 8.4 MG/DL (ref 8.6–10)
CHLORIDE SERPL-SCNC: 107 MMOL/L (ref 98–107)
COLOR UR AUTO: ABNORMAL
CREAT SERPL-MCNC: 0.67 MG/DL (ref 0.51–0.95)
DEPRECATED HCO3 PLAS-SCNC: 21 MMOL/L (ref 22–29)
EGFRCR SERPLBLD CKD-EPI 2021: >90 ML/MIN/1.73M2
ERYTHROCYTE [DISTWIDTH] IN BLOOD BY AUTOMATED COUNT: 11.7 % (ref 10–15)
GLUCOSE SERPL-MCNC: 98 MG/DL (ref 70–99)
GLUCOSE UR STRIP-MCNC: NEGATIVE MG/DL
HCT VFR BLD AUTO: 40.2 % (ref 35–47)
HGB BLD-MCNC: 13.7 G/DL (ref 11.7–15.7)
HGB UR QL STRIP: NEGATIVE
KETONES UR STRIP-MCNC: 10 MG/DL
LEUKOCYTE ESTERASE UR QL STRIP: NEGATIVE
MAGNESIUM SERPL-MCNC: 1.9 MG/DL (ref 1.7–2.3)
MCH RBC QN AUTO: 31.2 PG (ref 26.5–33)
MCHC RBC AUTO-ENTMCNC: 34.1 G/DL (ref 31.5–36.5)
MCV RBC AUTO: 92 FL (ref 78–100)
MUCOUS THREADS #/AREA URNS LPF: PRESENT /LPF
NITRATE UR QL: NEGATIVE
PH UR STRIP: 5.5 [PH] (ref 5–7)
PHOSPHATE SERPL-MCNC: 2.8 MG/DL (ref 2.5–4.5)
PLATELET # BLD AUTO: 203 10E3/UL (ref 150–450)
POTASSIUM SERPL-SCNC: 3.8 MMOL/L (ref 3.4–5.3)
PROT SERPL-MCNC: 5.2 G/DL (ref 6.4–8.3)
RBC # BLD AUTO: 4.39 10E6/UL (ref 3.8–5.2)
RBC URINE: 1 /HPF
SODIUM SERPL-SCNC: 135 MMOL/L (ref 135–145)
SP GR UR STRIP: 1.02 (ref 1–1.03)
UROBILINOGEN UR STRIP-MCNC: NORMAL MG/DL
WBC # BLD AUTO: 9.7 10E3/UL (ref 4–11)
WBC URINE: 1 /HPF

## 2024-06-06 PROCEDURE — 85018 HEMOGLOBIN: CPT | Performed by: NURSE PRACTITIONER

## 2024-06-06 PROCEDURE — G0378 HOSPITAL OBSERVATION PER HR: HCPCS

## 2024-06-06 PROCEDURE — 36416 COLLJ CAPILLARY BLOOD SPEC: CPT | Performed by: NURSE PRACTITIONER

## 2024-06-06 PROCEDURE — 84100 ASSAY OF PHOSPHORUS: CPT | Performed by: NURSE PRACTITIONER

## 2024-06-06 PROCEDURE — 80053 COMPREHEN METABOLIC PANEL: CPT | Performed by: NURSE PRACTITIONER

## 2024-06-06 PROCEDURE — 96376 TX/PRO/DX INJ SAME DRUG ADON: CPT

## 2024-06-06 PROCEDURE — 250N000012 HC RX MED GY IP 250 OP 636 PS 637: Performed by: STUDENT IN AN ORGANIZED HEALTH CARE EDUCATION/TRAINING PROGRAM

## 2024-06-06 PROCEDURE — 250N000011 HC RX IP 250 OP 636: Performed by: NURSE PRACTITIONER

## 2024-06-06 PROCEDURE — 83735 ASSAY OF MAGNESIUM: CPT | Performed by: NURSE PRACTITIONER

## 2024-06-06 PROCEDURE — 99222 1ST HOSP IP/OBS MODERATE 55: CPT | Performed by: INTERNAL MEDICINE

## 2024-06-06 PROCEDURE — 36415 COLL VENOUS BLD VENIPUNCTURE: CPT | Performed by: NURSE PRACTITIONER

## 2024-06-06 PROCEDURE — 81001 URINALYSIS AUTO W/SCOPE: CPT | Performed by: NURSE PRACTITIONER

## 2024-06-06 PROCEDURE — 96361 HYDRATE IV INFUSION ADD-ON: CPT

## 2024-06-06 PROCEDURE — 250N000013 HC RX MED GY IP 250 OP 250 PS 637: Performed by: STUDENT IN AN ORGANIZED HEALTH CARE EDUCATION/TRAINING PROGRAM

## 2024-06-06 PROCEDURE — 99239 HOSP IP/OBS DSCHRG MGMT >30: CPT | Performed by: STUDENT IN AN ORGANIZED HEALTH CARE EDUCATION/TRAINING PROGRAM

## 2024-06-06 RX ORDER — DEXAMETHASONE 4 MG/1
4 TABLET ORAL
Qty: 1 TABLET | Refills: 3 | Status: SHIPPED | OUTPATIENT
Start: 2024-06-06 | End: 2024-06-06

## 2024-06-06 RX ORDER — MIRTAZAPINE 7.5 MG/1
7.5 TABLET, FILM COATED ORAL AT BEDTIME
Status: DISCONTINUED | OUTPATIENT
Start: 2024-06-06 | End: 2024-06-06 | Stop reason: HOSPADM

## 2024-06-06 RX ORDER — PREDNISONE 20 MG/1
20 TABLET ORAL ONCE
Status: COMPLETED | OUTPATIENT
Start: 2024-06-06 | End: 2024-06-06

## 2024-06-06 RX ORDER — LEVOTHYROXINE SODIUM 150 UG/1
150 TABLET ORAL DAILY
Status: DISCONTINUED | OUTPATIENT
Start: 2024-06-06 | End: 2024-06-06 | Stop reason: HOSPADM

## 2024-06-06 RX ORDER — DEXAMETHASONE SODIUM PHOSPHATE 4 MG/ML
4 INJECTION, SOLUTION INTRA-ARTICULAR; INTRALESIONAL; INTRAMUSCULAR; INTRAVENOUS; SOFT TISSUE
Qty: 1 ML | Refills: 2 | Status: SHIPPED | OUTPATIENT
Start: 2024-06-06

## 2024-06-06 RX ORDER — PREDNISONE 5 MG/1
TABLET ORAL
COMMUNITY
Start: 2024-06-06 | End: 2024-08-07

## 2024-06-06 RX ADMIN — LEVOTHYROXINE SODIUM 150 MCG: 150 TABLET ORAL at 09:03

## 2024-06-06 RX ADMIN — HYDROCORTISONE SODIUM SUCCINATE 50 MG: 100 INJECTION, POWDER, FOR SOLUTION INTRAMUSCULAR; INTRAVENOUS at 06:04

## 2024-06-06 RX ADMIN — HYDROCORTISONE SODIUM SUCCINATE 50 MG: 100 INJECTION, POWDER, FOR SOLUTION INTRAMUSCULAR; INTRAVENOUS at 00:03

## 2024-06-06 RX ADMIN — PREDNISONE 20 MG: 20 TABLET ORAL at 11:25

## 2024-06-06 ASSESSMENT — ACTIVITIES OF DAILY LIVING (ADL)
ADLS_ACUITY_SCORE: 18

## 2024-06-06 NOTE — PHARMACY-ADMISSION MEDICATION HISTORY
Please see Admission Medication History completed on 6/5/24 under previous encounter at [facility] for information regarding prior to admission medications.     Patrizia Morel, PharmD, BCPS

## 2024-06-06 NOTE — PLAN OF CARE
Goal Outcome Evaluation:        VS: Temp: 97.6  F (36.4  C) Temp src: Oral BP: 98/57 Pulse: 77   Resp: 16 SpO2: 96 % O2 Device: None (Room air)      O2: On room air. Denies SOB, difficulty breathing and chest pain.   Output: Continent of bowel and bladder.    Last BM: 06/4 per patient report    Activity: Independent   Skin: CDI. Some bruising on R arm and shin   Pain: 2/10 abdominal discomfort. Managed with PRN compazine and Ativan    CMS: A&Ox4. Denies numbness and tingling in all extremities.   Dressing: N/A   Diet: Regular diet. Thin fluids. Whole meds.  Patient was able to finish a cheese pizza without feeling nauseous.    LDA: L PIV infusing LR at 75 ml/hr.  Stopped at 0600 per order. Patient is now saline locked   Equipment: IV pole and personal belongings   Plan: Continue POC   Additional Info: UA collected and sent this morning     0700 - Received a call from lab regarding patient hgb of 13 which is a significant drop from yesterdays hgb of 17.8. Lab will be redrawn.

## 2024-06-06 NOTE — PLAN OF CARE
"Goal Outcome Evaluation:    VS: BP 96/53 (BP Location: Right arm, Patient Position: Supine)   Pulse 66   Temp 97.7  F (36.5  C) (Oral)   Resp 18   Ht 1.753 m (5' 9\")   Wt 67.1 kg (147 lb 14.9 oz)   SpO2 98%   BMI 21.85 kg/m      O2: 98% on RA.    Neuro: A&Ox4. Denies numbness and tingling.    Bowel/Bladder: Contin. B&B.    LBM: 6/4/2024 per pt.    Diet: Regular good appetite.    Skin: Intact. Slight L shin bruising from mountain biking and R forearm bruising from PIV attempt yesterday 6/7/2024.   Pain: Currently denies.    Activity: Independent.    Dressings: N/A.    LDA: L PIV SL. Removed prior to discharge.    Equipment: IV pole. Personal belongings.    Plan: Continue POC. Steroid IV therapy. Expected discharge today 6/7/2024.    Additional Info:      DISCHARGE SUMMARY    Pt discharging to: home  Transportation: daughter  AVS given and discussed: Pt was given AVS and pt states understanding of content. Pt has no further questions.   Belongings returned: Yes, ensured all belongings packed and sent with pt. No items in security.   Comments: Escorted safely to elevators. Pt left at 11:37am    Gucci Ortega RN     "

## 2024-06-06 NOTE — DISCHARGE SUMMARY
[facility]  Hospitalist Discharge Summary      Date of Admission:  6/5/2024  Date of Discharge:  6/6/2024 11:53 AM  Discharging Provider: Emma Stevens MD  Discharge Service: Hospitalist Service, GOLD TEAM 21    Discharge Diagnoses     Adrenal insufficiency with adrenal crisis    Clinically Significant Risk Factors          Follow-ups Needed After Discharge   Follow-up Appointments     Follow Up ([facility])      Follow up with primary care provider, Mishel Mosher, within 14 days   for hospital follow- up.  The following labs/tests are recommended: TSH,   T4.      Appointments on [place] and/or [facility] (with [facility] or [facility]   provider or service). Call [phone] if you haven't heard regarding   these appointments within 7 days of discharge.           Follow up items: Recheck TSH at hospital follow up within 14 days of discharge  Unresulted Labs Ordered in the Past 30 Days of this Admission       No orders found for last 31 day(s).        These results will be followed up by PVP    Discharge Disposition   Discharged to home  Condition at discharge: Stable    Hospital Course   Triny Florian is a 54 year old female with past medical history significant for Bernard's disease and hypothyroidism who initially presented to [facility] with nausea, vomiting, and abdominal pain concerning for adrenal crisis.  She is transferred to [facility] for further management and Endocrine consultation.    # Concern for adrenal crisis   # Mosinee's disease (dx 1998)   # Nausea, vomiting, abdominal pain, resolved  # Mild hyponatremia   # Hypercalcemia   Presented to the ED with 1 days of nausea, vomiting, and abdominal pain.  Seen by Endocrinology in the past but her PCP Dr. Mosher has managed her medications for many years.  On prednisone 5-7.5mg daily PTA and fludrocortisone 0.1-0.3mg daily as needed.  Last crisis more than 10 years ago.  On admission, Na slightly low  at 134, K wnl, Cr 1.02, Ca2+ 10.7, .  TSH wnl.  WBC 12.2, Hgb 17.8, hematocrit 51.1, ANC 10.5, suspect hemoconcentration.  CT abdomen pelvis unremarkable; scattered diverticulosis noted but no e/o acute diverticulitis.  Received hydrocortisone 100mg IV x 1, ativan, dilaudid, and phenergan.  Hypertensive on assessment at [facility]. Baseline SBP 90-110s. Endocrine consulted and placed on taper in discharge medications as below; will end up at her home steroid regimen. Nausea and vomiting improved with compazine and phenergan and she was hydrated with IVF while unable to take in orals. On discharge, was able to tolerate solid food.  - Taper prednisone 15mg for one day, 10mg for one day and then back to PTA dose of 5mg daily  - Endocrinology referral for follow up  - Sent with IM decadron for future adrenal crisis    # Hypothyroidism   TSH this admission 0.68.  On levothyroxine 150mcg PTA. Decreased from 175mcg about 3 months ago.  Has noticed increased fatigue and working with outpatient provider. Continued PTA levothyroxine with recheck TFTs at hospital follow up.    Consultations This Hospital Stay   ENDOCRINE NON-DIABETES ADULT IP CONSULT    Code Status   Prior    Jerel Ellis MD  [facility] [address]  [address] [place] [id number]  Phone: [phone]  Fax: [phone]    Physician Attestation   I, Emma Stevens MD, was present with the medical/[employer] student who participated in the service and in the documentation of the note.  I have verified the history and personally performed the physical exam and medical decision making.  I agree with the assessment and plan of care as documented in the note.      35 MINUTES SPENT BY ME on the date of service doing chart review, history, exam, documentation & further activities per the note.      Emma Stevens MD  Date of Service (when I saw the patient):  06/07/24      ______________________________________________________________________    Physical Exam   Vital Signs: Temp: 97  F (36.1  C) Temp src: Oral BP: 93/48 Pulse: 76   Resp: 18 SpO2: 94 % O2 Device: None (Room air)    Weight: 147 lbs 14.86 oz  GENERAL: Alert and oriented x 3. Well nourished, well developed.  No acute distress.    HEENT: Normocephalic, atraumatic. Anicteric sclera. Mucous membranes moist.   CV: RRR. S1, S2. No murmurs appreciated.   RESPIRATORY: Effort normal on room air. Lungs CTAB with no wheezing, rales, or rhonchi.   GI: Abdomen soft and non distended, bowel sounds present x all 4 quadrants. No tenderness, rebound, or guarding.   NEUROLOGICAL: No focal deficits. Follows commands.  Strength equal in upper and lower extremities.   MUSCULOSKELETAL: No joint swelling or tenderness. Moves all extremities.   EXTREMITIES: No gross deformities. No peripheral edema.   SKIN: Grossly warm, dry, and intact. No jaundice. No rashes.           Primary Care Physician   Mishel Mosher    Discharge Orders      Adult Endocrinology  Referral      Reason for your hospital stay    You were hospitalized for an adrenal crisis. You were treated with IV fluids and with medications for pain and nausea. You were seen by an endocrinologist and will be sent home with a steroid taper. Please resume your usual steroid regimen after the taper ends (instructions in your discharge paperwork).     Activity    Your activity upon discharge: activity as tolerated     Follow Up ([facility])    Follow up with primary care provider, Mishel Mosher, within 14 days for hospital follow- up.  The following labs/tests are recommended: TSH, T4.      Appointments on [place] and/or [facility] (with [facility] or [facility] provider or service). Call [phone] if you haven't heard regarding these appointments within 7 days of discharge.     Diet    Follow this diet upon discharge: Orders Placed This Encounter      Regular Diet  Adult       Significant Results and Procedures   Results for orders placed or performed during the hospital encounter of 06/05/24   CT Abdomen Pelvis w Contrast    Narrative    CT ABDOMEN/PELVIS WITH CONTRAST June 5, 2024 12:45 PM    CLINICAL HISTORY: Abdominal pain. Left lower quadrant abdominal pain.    TECHNIQUE: CT scan of the abdomen and pelvis was performed following  injection of IV contrast. Multiplanar reformats were obtained. Dose  reduction techniques were used.  CONTRAST: 74mL [id number].    COMPARISON: None.    FINDINGS:   LOWER CHEST: No infiltrates or effusions.    HEPATOBILIARY: No significant mass or bile duct dilatation.  Cholecystectomy.     PANCREAS: No significant mass, duct dilatation, or inflammatory  change.    SPLEEN: Normal size.    ADRENAL GLANDS: No significant nodules.    KIDNEYS/BLADDER: No significant mass, stones, or hydronephrosis.    BOWEL: There is minimal scattered diverticulosis without evidence for  diverticulitis. There are no dilated loops of small intestine or large  bowel to suggest ileus or obstruction. Unremarkable appendix.    PELVIC ORGANS: No pelvic masses.    ADDITIONAL FINDINGS: No ascites.    MUSCULOSKELETAL: No frankly destructive bony lesions.      Impression    IMPRESSION: No acute process demonstrated.    ALAN GU MD         SYSTEM ID:  [id number]       Discharge Medications   Discharge Medication List as of 6/6/2024 10:57 AM        START taking these medications    Details   dexAMETHasone (DECADRON) 4 MG/ML injection Inject 1 mL (4 mg) into the muscle once as needed for other (adrenal crisis) Use 4 mg or dose determined by provider for iontophoresis., Disp-1 mL, R-2, E-Prescribe           CONTINUE these medications which have CHANGED    Details   predniSONE (DELTASONE) 5 MG tablet Take 3 tablets (15 mg) by mouth daily, THEN 2 tablets (10 mg) daily, THEN 1 tablet (5 mg) daily. TAKE ONE TO ONE AND ONE-HALF TABLETS BY MOUTH DAILY, Historical           CONTINUE  these medications which have NOT CHANGED    Details   docusate sodium (COLACE) 50 MG capsule Take 50 mg by mouth as needed for constipation, Historical      fludrocortisone (FLORINEF) 0.1 MG tablet TAKE ONE TO THREE TABLETS BY MOUTH DAILY AS NEEDED, Disp-270 tablet, R-4, E-Prescribe      ibuprofen (ADVIL/MOTRIN) 200 MG tablet Take 200 mg by mouth every 4 hours as needed for pain, Historical      levothyroxine (SYNTHROID/LEVOTHROID) 150 MCG tablet Take 1 tablet (150 mcg) by mouth daily, Disp-90 tablet, R-4, E-Prescribe      mirtazapine (REMERON) 7.5 MG tablet Take 1 tablet (7.5 mg) by mouth at bedtime, Disp-90 tablet, R-3, E-Prescribe      promethazine (PHENERGAN) 25 MG tablet Take 1 tablet (25 mg) by mouth every 6 hours as needed for nausea, Disp-20 tablet, R-0, E-Prescribe      valACYclovir (VALTREX) 1000 mg tablet Take 1 tablet (1,000 mg) by mouth 2 times daily, Disp-60 tablet, R-1, E-PrescribePlease file this prescription and don't fill until patient calls for this prescription, thanks!           Allergies   Allergies   Allergen Reactions    Haloperidol Anaphylaxis    Erythromycin     Haloperidol Swelling    Metoclopramide Other (See Comments)     facial twitch    Ondansetron Nausea and Vomiting    Reglan [Metoclopramide]     Sulfa Antibiotics     Zofran [Ondansetron Hcl-Dextrose]

## 2024-06-06 NOTE — CONSULTS
=======================================================  Assessment     Triny Florian is a 53 yo female with a history of autoimmune polyglandular syndrome type II, currently admitted for nausea, vomiting, abdominal pain and impending adrenal crisis.  The symptoms have responded to treatment with IV hydrocortisone.  The patient is afebrile and there is no evidence of an infectious process.  Unclear what triggered the vomiting but she most likely did not absorb the prednisone dose on Wednesday, and this aggravated her symptoms.  Counseled the patient on the use of dexamethasone intramuscularly or even subcutaneously, for situations where nausea is severe and she cannot hold onto her oral medications.    Unclear at this time whether or not she would benefit from a slightly higher dose of prednisone but this is something we can evaluate as outpatient.  The patient can be discharged today and I recommend restarting the fludrocortisone and the following tapering of oral prednisone: 20 mg today, 15 mg tomorrow, 10 mg the following day and then 5 mg daily.     I reviewed with the patient the need of doubling or tripling the dose of prednisone for times of illness and the use of dexamethasone for emergency situations.  She would prefer to establish care in our clinic and the primary team can arrange this.      Hypothyroidism, requiring frequent adjustments of levothyroxine dose  I counseled the patient on the correct administration of levothyroxine, the main minerals, food products and medications which interfere with levothyroxine absorption.  The patient agreed to take it consistently around 3 AM, when she uses the restroom, with a plan to recheck the thyroid hormone levels in 2 months.  This can be done through her primary care clinic or in our endocrinology clinic.    Orders Placed This Encounter   Procedures    Comprehensive metabolic panel    CBC with platelets    Magnesium    Phosphorus    Magnesium    Phosphorus     UA with Microscopic reflex to Culture    Assign Team    Peripheral IV catheter    Pulse oximetry nursing    Vital signs    Intake and output    NO Indwelling urinary catheter (Gonzalez) PRESENT or NEEDED    Voiding Protocol (Trial of Void)    NO COVID-19 Virus (Coronavirus) Screening Test Needed OR Already Ordered/Completed    Apply pneumatic compression device (PCD) - Bilateral Calf    Full Code    Endocrine NON-Diabetes Adult IP Consult: Hx Ringling's disease w/ concern for crisis, transferred from [facility] for Endocrine consultation, may need action plan; Consultant may enter orders: Yes; Requesting provider? Hospitalist (if different from...    Admit to Inpatient    Pneumatic Compression Device (Equipment) - Bilateral Calf     =======================================================  The patient is seen in consultation at the request of Dr. Rene Robertson.     History of Present Illness:  Triny Florian is a 55 yo female with a history of Hashimoto thyroiditis and Ringling's disease, currently admitted for adrenal crisis.    Triny was diagnosed with Hashimoto thyroiditis around 1996 and with Bernard around 1998.  Her prior care was in [place] until she moved here, couple of years ago.  Currently, she follows up with her primary care provider.  She does wear a medical alert bracelet and she does have 4 mg dexamethasone at home for emergency use (never used it).  For illnesses or fever, she does triple the dose of 5 mg prednisone, which is her baseline replacement dose.  If she does bike and it is hot outside, she takes a higher dose of fludrocortisone.  Shortly after being diagnosed she remembers being treated with hydrocortisone but she preferred prednisone as she had better energy levels.  The dose of fludrocortisone has not been changed over the years and 0.1 mcg daily.      Initially, for a few years after being diagnosed with any send she experienced quite frequent episodes of adrenal crisis.  Their  frequency has progressively declined and the last episode of adrenal crisis was approximately 10 years ago.  The symptoms of adrenal crisis are easily recognizable by the patient: Nausea, vomiting, lower abdominal pain and headaches.   Received 100 mg hydrocortisone iv yesterday at 10 AM and 50 mg at 6 PM, following by 2 dosages of 50 mg hydrocortisone overnight, most recent one at 6 AM.     Currently, she reports feeling good, with no GI symptoms.  She ate dinner last night and breakfast today with no issues.    She has been feeling fatigued for quite some time.  This Tuesday, after lunch, she developed nausea and vomiting, later lower abdominal pain (variable intensity, at maximum 8 out of 10).  Denies experiencing lightheadedness, diarrhea.  On Wednesday morning she did take the prednisone but most likely she did not absorb it as she remembers vomiting right away.  Subsequently, she was hospitalized initially at [facility] and transferred here yesterday.  Vitals on hospitalization were stable.  She had mild hyponatremia with a sodium level of 134 but the potassium has remained normal throughout her hospitalization.  Calcium level was mildly elevated but improved with hydration.  WBC was normal.  She has been afebrile.    Since September of last year, the patient has been experiencing variable thyroid hormone levels, requiring multiple adjustments of levothyroxine dose.  For the last 3 months, she has been taking 150 mcg levothyroxine daily.  She reports taking the levothyroxine right before having her morning breakfast.     Past Medical History   Past Medical History:   Diagnosis Date    Acute bronchospasm 2/27/2020    Addisons disease (H)     Arthritis 2016    great toes, thumbs    CARDIOVASCULAR SCREENING; LDL GOAL LESS THAN 160 5/23/2011    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 central line placement when being diagnosed    Hepatitis 11/10/2006    History of blood transfusion 1998    Other diseases of lung, not  elsewhere classified 3/21/2007    Overview:  pulm nodule: ANNAMARIA suprahilar 7 mm non calcified noted on CXR after TB exposure at work  CT scheduled-    Painful scar 5/23/2012    where feeding tube is    Thyroid disease 1995    hypothyroid   Menopause between 50 and 53 (had a Mirena IUD)  No prior fractures    Past Surgical History   Past Surgical History:   Procedure Laterality Date    CHOLECYSTECTOMY      COLONOSCOPY  2018    GYN SURGERY      ORTHOPEDIC SURGERY  2016    toe surgery       Current Medications    Current Facility-Administered Medications:     calcium carbonate (TUMS) chewable tablet 1,000 mg, 1,000 mg, Oral, 4x Daily PRN, Bettina Dorsey CNP    hydrocortisone sodium succinate PF (solu-CORTEF) injection 50 mg, 50 mg, Intravenous, Q6H, Bettina Dorsey CNP, 50 mg at 06/06/24 0604    levothyroxine (SYNTHROID/LEVOTHROID) tablet 150 mcg, 150 mcg, Oral, Daily, Emma Stevens MD, 150 mcg at 06/06/24 0903    lidocaine (LMX4) cream, , Topical, Q1H PRN, Bettina Dorsey CNP    lidocaine 1 % 0.1-1 mL, 0.1-1 mL, Other, Q1H PRN, Bettina Dorsey CNP    LORazepam (ATIVAN) injection 1 mg, 1 mg, Intravenous, Daily PRN, Bettina Dorsey CNP, 1 mg at 06/05/24 2144    mirtazapine (REMERON) tablet TABS 7.5 mg, 7.5 mg, Oral, At Bedtime, Emma Stevens MD    prochlorperazine (COMPAZINE) injection 5-10 mg, 5-10 mg, Intravenous, Q6H PRN, 10 mg at 06/05/24 2142 **OR** prochlorperazine (COMPAZINE) suppository 25 mg, 25 mg, Rectal, Q12H PRN, Bettina Dorsey CNP    promethazine (PHENERGAN) tablet 25 mg, 25 mg, Oral, Q6H PRN, Bettina Dorsey CNP    senna-docusate (SENOKOT-S/PERICOLACE) 8.6-50 MG per tablet 1 tablet, 1 tablet, Oral, BID PRN **OR** senna-docusate (SENOKOT-S/PERICOLACE) 8.6-50 MG per tablet 2 tablet, 2 tablet, Oral, BID PRN, Bettina Dorsey CNP    sodium chloride (PF) 0.9% PF flush 3 mL, 3 mL, Intracatheter, Q8H, [place],  Bettina Mosher CNP, 3 mL at 06/06/24 0903    sodium chloride (PF) 0.9% PF flush 3 mL, 3 mL, Intracatheter, q1 min prn, Bettina Dorsey CNP    Family History   Problem Relation Age of Onset    Hypertension Mother     Arrhythmia Mother     Hypertension Brother     Anxiety Disorder Brother     Substance Abuse Brother     Thyroid Disease Brother     Asthma Nephew    Brother diagnosed with cyclical vomiting syndrome (? related to marijuana).  Brother diagnosed with hypothyroidism.     Social History  .  She has 3 children.  She denies smoking, drinking alcohol or using illicit drugs. Occupation: RN    Review of Systems   Systemic:             Longstanding fatigue, weight has been stable  Eye:                      No eye symptoms   Juan-Laryngeal:     No juan-laryngeal symptoms, no dysphagia, no hoarseness, no cough     Breast:                  No breast symptoms  Cardiovascular:    No cardiovascular symptoms, no CP or palpitations   Pulmonary:           No pulmonary symptoms, no SOB or cough    Gastrointestinal:   As above  Genitourinary:       increased thirst, no increased urination   Endocrine:            No endocrine symptoms, no cold or heat intolerance   Neurological:        No neurological symptoms, no headaches, no tremor, no numbness or tingling sensation, no dizziness   Musculoskeletal: Muscle cramps in her legs and feet, charley horse cramping, some osteoarthritis in her feet  Skin:                     No skin symptoms, no dry skin, no hair falling out   Psychological:     No psychological symptoms                 Vital Signs     Previous Weights:    Wt Readings from Last 10 Encounters:   06/05/24 67.1 kg (147 lb 14.9 oz)   06/05/24 67.1 kg (148 lb)   05/17/24 68.9 kg (152 lb)   08/30/23 68.9 kg (152 lb)   05/19/23 68.9 kg (152 lb)   12/10/22 68.9 kg (152 lb)   05/18/22 70.3 kg (155 lb)   07/29/21 69.4 kg (152 lb 14.4 oz)   01/08/21 70.4 kg (155 lb 3.2 oz)   03/05/20 68.9 kg (152 lb)     "    BP 96/53 (BP Location: Right arm, Patient Position: Supine)   Pulse 66   Temp 97.7  F (36.5  C) (Oral)   Resp 18   Ht 1.753 m (5' 9\")   Wt 67.1 kg (147 lb 14.9 oz)   SpO2 98%   BMI 21.85 kg/m      Physical Exam  General Appearance: she is well developed, well nourished and in no distress     Eyes:  conjutivae and extra-ocular motions are normal.                                    pupils round and reactive to light, no lid lag, no stare    HEENT:   oropharynx clear and moist, no JVD, no bruits      no thyromegaly, no palpable nodules   Cardiovascular:  regular rhythm, no murmurs, distal pulse palpable, no edema  Respiratory:        chest clear, no rales, no rhonchi   Gastrointestinal:  abdomen soft, non-tender, non-distended, normal bowel sounds,    no organomegaly  Musculoskeletal:  normal tone and strength  Psychological:          affect and judgment normal  Skin: benign looking abdominal striae, no areas of obvious hypo or hyperpigmentation  Neurological:  reflexes normal and symmetric, no resting tremor.     Lab Results  I reviewed prior lab results documented in Epic.  TSH   Date Value Ref Range Status   06/05/2024 0.68 0.30 - 4.20 uIU/mL Final   02/22/2024 6.37 (H) 0.30 - 4.20 uIU/mL Final   12/20/2023 4.19 0.30 - 4.20 uIU/mL Final   10/26/2023 0.22 (L) 0.30 - 4.20 uIU/mL Final   08/30/2023 0.15 (L) 0.30 - 4.20 uIU/mL Final   05/18/2022 0.17 (L) 0.40 - 4.00 mU/L Final   01/08/2021 1.33 0.40 - 4.00 mU/L Final   12/13/2019 1.30 0.40 - 4.00 mU/L Final   08/21/2018 0.92 0.40 - 4.00 mU/L Final   11/29/2017 3.14 0.40 - 4.00 mU/L Final   09/21/2016 2.74 0.40 - 5.00 mU/L Final                               "

## 2024-06-07 ENCOUNTER — PATIENT OUTREACH (OUTPATIENT)
Dept: FAMILY MEDICINE | Facility: CLINIC | Age: 54
End: 2024-06-07
Payer: COMMERCIAL

## 2024-06-07 NOTE — TELEPHONE ENCOUNTER
Transitions of Care Outreach  Chief Complaint   Patient presents with    Hospital F/U     Addisonian crisis 6/5/24       Most Recent Admission Date: 6/5/2024   Most Recent Admission Diagnosis:      Most Recent Discharge Date: 6/6/2024   Most Recent Discharge Diagnosis: Autoimmune [place]'s disease (H) - E27.1  Addisonian crisis (H24) - E27.2     Transitions of Care Assessment    Discharge Assessment  How are you doing now that you are home?: doing much better  How are your symptoms? (Red Flag symptoms escalate to triage hotline per guidelines): Improved  Do you know how to contact your clinic care team if you have future questions or changes to your health status? : Yes  Does the patient have their discharge instructions? : Yes  Does the patient have questions regarding their discharge instructions? : No  Were you started on any new medications or were there changes to any of your previous medications? : Yes (Patient stated no.)  Does the patient have all of their medications?: Yes  Do you have questions regarding any of your medications? : No  Do you have all of your needed medical supplies or equipment (DME)?  (i.e. oxygen tank, CPAP, cane, etc.): Yes    Follow up Plan     Discharge Follow-Up  Discharge follow up appointment scheduled in alignment with recommended follow up timeframe or Transitions of Risk Category? (Low = within 30 days; Moderate= within 14 days; High= within 7 days): No (Patient stated she will see Dr. Mosher in September. Did not want to schedule a Hospital f/up visit.)  Patient's follow up appointment not scheduled: Patient declined scheduling support. Education on the importance of transitions of care follow up. Provided scheduling phone number.    Future Appointments   Date Time Provider Department Center   8/7/2024  3:30 PM [facility]   9/4/2024  3:40 PM Mishel Mosher MD [facility]       Outpatient Plan as outlined on AVS reviewed with  patient.    For any urgent concerns, please contact our 24 hour nurse triage line: [phone] ([phone])       Avis Nesbitt RN

## 2024-08-07 ENCOUNTER — OFFICE VISIT (OUTPATIENT)
Dept: ENDOCRINOLOGY | Facility: CLINIC | Age: 54
End: 2024-08-07
Payer: COMMERCIAL

## 2024-08-07 VITALS
SYSTOLIC BLOOD PRESSURE: 117 MMHG | OXYGEN SATURATION: 98 % | HEART RATE: 65 BPM | RESPIRATION RATE: 18 BRPM | DIASTOLIC BLOOD PRESSURE: 81 MMHG | BODY MASS INDEX: 22.59 KG/M2 | WEIGHT: 153 LBS

## 2024-08-07 DIAGNOSIS — E27.1 AUTOIMMUNE ADDISON'S DISEASE (H): ICD-10-CM

## 2024-08-07 DIAGNOSIS — R53.83 OTHER FATIGUE: ICD-10-CM

## 2024-08-07 DIAGNOSIS — E06.3 HYPOTHYROIDISM DUE TO HASHIMOTO'S THYROIDITIS: ICD-10-CM

## 2024-08-07 DIAGNOSIS — E06.3 HASHIMOTO'S THYROIDITIS: ICD-10-CM

## 2024-08-07 DIAGNOSIS — E27.1 AUTOIMMUNE ADDISON'S DISEASE (H): Primary | ICD-10-CM

## 2024-08-07 PROCEDURE — 99204 OFFICE O/P NEW MOD 45 MIN: CPT | Performed by: INTERNAL MEDICINE

## 2024-08-07 PROCEDURE — G2211 COMPLEX E/M VISIT ADD ON: HCPCS | Performed by: INTERNAL MEDICINE

## 2024-08-07 RX ORDER — PREDNISONE 5 MG/1
TABLET ORAL
Qty: 110 TABLET | Refills: 3 | Status: SHIPPED | OUTPATIENT
Start: 2024-08-07

## 2024-08-07 RX ORDER — FLUDROCORTISONE ACETATE 0.1 MG/1
0.1 TABLET ORAL DAILY
Qty: 270 TABLET | Refills: 3 | Status: SHIPPED | OUTPATIENT
Start: 2024-08-07

## 2024-08-07 RX ORDER — LEVOTHYROXINE SODIUM 150 UG/1
175 TABLET ORAL DAILY
Qty: 90 TABLET | Refills: 4 | Status: SHIPPED | OUTPATIENT
Start: 2024-08-07

## 2024-08-07 RX ORDER — FLUDROCORTISONE ACETATE 0.1 MG/1
TABLET ORAL
Qty: 270 TABLET | Refills: 4 | Status: SHIPPED | OUTPATIENT
Start: 2024-08-07 | End: 2024-08-07

## 2024-08-07 NOTE — LETTER
8/7/2024      Triny Florian  [address] [address]      Dear Colleague,    Thank you for referring your patient, Triny Florian, to the [facility]. Please see a copy of my visit note below.       Assessment      Triny Florian is a 54 year old female with a history of autoimmune polyglandular syndrome type II, seen in follow-up.      1.  [place]'s disease  Clinically, the patient endorses persistent fatigue but no other signs or symptoms suggestive of steroid deficiency.  Recommended to have a morning BMP and renin level checked. Following the lab results, consider increasing the dose of prednisone to 6 mg daily, on a trial basis.    2.  Hashimoto thyroiditis  Follow-up TFTs today.    3.  Intermittent episodes of diarrhea  Screen for celiac disease by pursuing tissue transglutaminase antibodies    4. Fatigue   No evidence of anemia on recent labs. F/up B12 level.      Orders Placed This Encounter   Procedures     Basic metabolic panel     Adrenal corticotropin     Renin activity     TSH     T4 free     Tissue transglutaminase antibody IgA     Vitamin B12     =======================================================  The patient is seen in f/up following her recent hospitalization from June 2024, when she was admitted for adrenal crisis.     History of Present Illness:  Triny Florian is a 54 year old female with a history of autoimmune polyglandular syndrome type II.     Triny was diagnosed with Hashimoto thyroiditis around 1996 and with Bernard around 1998.  Her prior care was in [place] until she moved here, couple of years ago.      She does wear a medical alert bracelet and she does have 4 mg dexamethasone at home for emergency use (never used it).  For illnesses or fever, she does triple the dose of 5 mg prednisone, which is her baseline replacement dose.  If she does bike and it is hot outside, she takes a higher dose of fludrocortisone.  Shortly after  being diagnosed she remembers being treated with hydrocortisone but she preferred prednisone as she had better energy levels.  The dose of fludrocortisone has not been changed over the years and 0.1 mcg daily.       Initially, for a few years after being diagnosed with AI she experienced quite frequent episodes of adrenal crisis.  Their frequency has progressively declined and the last episode of adrenal crisis was 10 years ago prior to the recent hospitalization.  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  The symptoms of adrenal crisis are easily recognizable by the patient: Nausea, vomiting, lower abdominal pain and headaches.      The current dose of levothyroxine is 150 mcg daily and the dose has not changed for the last few months.  She reports taking it around 3-4 in the morning.  Breakfast is generally anywhere between 5:30 to 7:30 AM.    Triny complains of significant fatigue present mainly in the evening.  She denies nausea, vomiting, lightheadedness.  Her weight has been very stable over the last 2 years.  She has been experiencing occasional episodes of diarrhea or constipation.  For the last 5 years, she has been intermittently feeling warm at night.  Denies experiencing hot flashes during daytime.  On questioning, she denies tremor, palpitations, hair loss, dry skin, headaches, visual problems (besides glasses).    Most recent TSH was normal at 0.68, on 6/5/2024.  Most recent BMP panel was normal on 6/6/2024.    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 central line placement when being diagnosed     Hepatitis 11/10/2006     History of blood transfusion 1998     Other diseases of lung, not elsewhere classified 3/21/2007    Overview:   pulm nodule: ANNAMARIA suprahilar 7 mm non calcified noted on CXR after TB exposure at work  CT scheduled-     Painful scar 5/23/2012    where feeding tube is     Thyroid disease 1995    hypothyroid   Menopause between 50 and 53 (had a Mirena IUD)  No prior fractures     Past Surgical History   Past Surgical History:   Procedure Laterality Date     CHOLECYSTECTOMY       COLONOSCOPY  2018     GYN SURGERY       ORTHOPEDIC SURGERY  2016    toe surgery         Current Medications    Current Outpatient Medications:      dexAMETHasone (DECADRON) 4 MG/ML injection, Inject 1 mL (4 mg) into the muscle once as needed for other (adrenal crisis) Use 4 mg or dose determined by provider for iontophoresis., Disp: 1 mL, Rfl: 2     docusate sodium (COLACE) 50 MG capsule, Take 50 mg by mouth as needed for constipation, Disp: , Rfl:      fludrocortisone (FLORINEF) 0.1 MG tablet, TAKE ONE TO THREE TABLETS BY MOUTH DAILY AS NEEDED, Disp: 270 tablet, Rfl: 4     ibuprofen (ADVIL/MOTRIN) 200 MG tablet, Take 200 mg by mouth every 4 hours as needed for pain, Disp: , Rfl:      levothyroxine (SYNTHROID/LEVOTHROID) 150 MCG tablet, Take 1 tablet (150 mcg) by mouth daily, Disp: 90 tablet, Rfl: 4     mirtazapine (REMERON) 7.5 MG tablet, Take 1 tablet (7.5 mg) by mouth at bedtime, Disp: 90 tablet, Rfl: 3     predniSONE (DELTASONE) 5 MG tablet, Take 3 tablets (15 mg) by mouth daily, THEN 2 tablets (10 mg) daily, THEN 1 tablet (5 mg) daily. TAKE ONE TO ONE AND ONE-HALF TABLETS BY MOUTH DAILY, Disp: , Rfl:      promethazine (PHENERGAN) 25 MG tablet, Take 1 tablet (25 mg) by mouth every 6 hours as needed for nausea, Disp: 20 tablet, Rfl: 0     valACYclovir (VALTREX) 1000 mg tablet, Take 1 tablet (1,000 mg) by mouth 2 times daily (Patient not taking: Reported on 6/5/2024), Disp: 60 tablet, Rfl: 1    Current Facility-Administered Medications:      0.5 mL ropivacaine (NAROPIN) injection 5 mg/mL, 0.5 mL, , , , 0.5 mL at 05/17/24 0830     0.5 mL ropivacaine  (NAROPIN) injection 5 mg/mL, 0.5 mL, , , , 0.5 mL at 05/17/24 0830     0.5 mL ropivacaine (NAROPIN) injection 5 mg/mL, 0.5 mL, , , , 0.5 mL at 05/17/24 0832     0.5 mL ropivacaine (NAROPIN) injection 5 mg/mL, 0.5 mL, , , , 0.5 mL at 05/17/24 0833     methylPREDNISolone (DEPO-Medrol) injection 20 mg, 20 mg, , , , 20 mg at 05/17/24 0830     methylPREDNISolone (DEPO-Medrol) injection 20 mg, 20 mg, , , , 20 mg at 05/17/24 0830     methylPREDNISolone (DEPO-Medrol) injection 40 mg, 40 mg, , , , 40 mg at 05/17/24 0833     methylPREDNISolone (DEPO-Medrol) injection 40 mg, 40 mg, , , , 40 mg at 05/17/24 0832           Family History   Problem Relation Age of Onset     Hypertension Mother       Arrhythmia Mother       Hypertension Brother       Anxiety Disorder Brother       Substance Abuse Brother       Thyroid Disease Brother       Asthma Nephew     Brother diagnosed with cyclical vomiting syndrome (? related to marijuana).  Brother diagnosed with hypothyroidism.      Social History  .  She has 3 children.  She denies smoking, drinking alcohol or using illicit drugs. 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      Review of Systems   Systemic:             Longstanding fatigue, weight has been stable  Eye:                      No eye symptoms   Kelli-Laryngeal:     No kelli-laryngeal symptoms, no dysphagia, no hoarseness, no cough     Breast:                  No breast symptoms  Cardiovascular:    No cardiovascular symptoms, no CP or palpitations   Pulmonary:           No pulmonary symptoms, no SOB or cough    Gastrointestinal:   As above  Genitourinary:       increased thirst, no increased urination   Endocrine:            No endocrine symptoms, no cold or heat intolerance   Neurological:        No neurological symptoms, no headaches, no tremor, no numbness or tingling sensation, no dizziness   Musculoskeletal: Muscle cramps in her legs and feet, charley horse cramping, some osteoarthritis in her feet  Skin:                      No skin symptoms, no dry skin, no hair falling out   Psychological:     No psychological symptoms                 BP Readings from Last 6 Encounters:   08/07/24 117/81   06/06/24 93/48   06/05/24 (!) 150/90   05/17/24 112/68   08/30/23 101/71   05/10/23 106/74     Wt Readings from Last 10 Encounters:   08/07/24 69.4 kg (153 lb)   06/05/24 67.1 kg (147 lb 14.9 oz)   06/05/24 67.1 kg (148 lb)   05/17/24 68.9 kg (152 lb)   08/30/23 68.9 kg (152 lb)   05/19/23 68.9 kg (152 lb)   12/10/22 68.9 kg (152 lb)   05/18/22 70.3 kg (155 lb)   07/29/21 69.4 kg (152 lb 14.4 oz)   01/08/21 70.4 kg (155 lb 3.2 oz)     /81 (BP Location: Left arm, Patient Position: Sitting, Cuff Size: Adult Regular)   Pulse 65   Resp 18   Wt 69.4 kg (153 lb)   SpO2 98%   BMI 22.59 kg/m      Lying down /68 pulse 65  Standing BP 98/68 pulse 65   Standing 3 minutes 110/79 pulse 65     Physical Exam  General Appearance: she is well developed, well nourished and in no distress     Eyes:                         conjutivae and extra-ocular motions are normal.                                    pupils round and reactive to light, no lid lag, no stare    HEENT:                             oropharynx clear and moist, no JVD, no bruits                                             no thyromegaly, no palpable nodules   Cardiovascular:               regular rhythm, no murmurs, distal pulse palpable, no edema  Respiratory:                    chest clear, no rales, no rhonchi   Gastrointestinal:        abdomen soft, non-tender, non-distended, normal bowel sounds, no organomegaly  Musculoskeletal:       normal tone and strength  Psychological:          affect and judgment normal  Skin:                         benign looking abdominal striae, skin appears hyperpigmented in sun exposed areas  Neurological:             reflexes normal and symmetric, no resting tremor.      35 minutes spent on the date of the encounter doing chart review, history and  exam, documentation and further activities as noted above.  The longitudinal plan of care for the diagnosis(es)/condition(s) as documented were addressed during this visit. Due to the added complexity in care, I will continue to support Triny in the subsequent management and with ongoing continuity of care.                              Again, thank you for allowing me to participate in the care of your patient.        Sincerely,        Leela Tran MD

## 2024-08-07 NOTE — PROGRESS NOTES
Assessment      Triny Florian is a 54 year old female with a history of autoimmune polyglandular syndrome type II, seen in follow-up.      1.  [place]'s disease  Clinically, the patient endorses persistent fatigue but no other signs or symptoms suggestive of steroid deficiency.  Recommended to have a morning BMP and renin level checked. Following the lab results, consider increasing the dose of prednisone to 6 mg daily, on a trial basis.    2.  Hashimoto thyroiditis  Follow-up TFTs today.    3.  Intermittent episodes of diarrhea  Screen for celiac disease by pursuing tissue transglutaminase antibodies    4. Fatigue   No evidence of anemia on recent labs. F/up B12 level.      Orders Placed This Encounter   Procedures    Basic metabolic panel    Adrenal corticotropin    Renin activity    TSH    T4 free    Tissue transglutaminase antibody IgA    Vitamin B12     =======================================================  The patient is seen in f/up following her recent hospitalization from June 2024, when she was admitted for adrenal crisis.     History of Present Illness:  Triny Florian is a 54 year old female with a history of autoimmune polyglandular syndrome type II.     Triny was diagnosed with Hashimoto thyroiditis around 1996 and with Bernard around 1998.  Her prior care was in [place] until she moved here, couple of years ago.      She does wear a medical alert bracelet and she does have 4 mg dexamethasone at home for emergency use (never used it).  For illnesses or fever, she does triple the dose of 5 mg prednisone, which is her baseline replacement dose.  If she does bike and it is hot outside, she takes a higher dose of fludrocortisone.  Shortly after being diagnosed she remembers being treated with hydrocortisone but she preferred prednisone as she had better energy levels.  The dose of fludrocortisone has not been changed over the years and 0.1 mcg daily.       Initially, for a few years after  being diagnosed with AI she experienced quite frequent episodes of adrenal crisis.  Their frequency has progressively declined and the last episode of adrenal crisis was 10 years ago prior to the recent hospitalization.  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  The symptoms of adrenal crisis are easily recognizable by the patient: Nausea, vomiting, lower abdominal pain and headaches.      The current dose of levothyroxine is 150 mcg daily and the dose has not changed for the last few months.  She reports taking it around 3-4 in the morning.  Breakfast is generally anywhere between 5:30 to 7:30 AM.    Triny complains of significant fatigue present mainly in the evening.  She denies nausea, vomiting, lightheadedness.  Her weight has been very stable over the last 2 years.  She has been experiencing occasional episodes of diarrhea or constipation.  For the last 5 years, she has been intermittently feeling warm at night.  Denies experiencing hot flashes during daytime.  On questioning, she denies tremor, palpitations, hair loss, dry skin, headaches, visual problems (besides glasses).    Most recent TSH was normal at 0.68, on 6/5/2024.  Most recent BMP panel was normal on 6/6/2024.    Past Medical History   Past Medical History:   Diagnosis Date    Acute bronchospasm 2/27/2020    Addisons disease (H)     Arthritis 2016    great toes, thumbs    CARDIOVASCULAR SCREENING; LDL GOAL LESS THAN 160 5/23/2011    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 central line placement when being diagnosed    Hepatitis 11/10/2006    History of blood transfusion 1998    Other diseases of lung, not elsewhere classified 3/21/2007    Overview:  pulm nodule: ANNAMARIA suprahilar 7 mm non calcified noted on CXR after TB exposure at work  CT scheduled-    Painful scar 5/23/2012    where feeding tube is    Thyroid disease 1995    hypothyroid   Menopause between 50 and 53 (had a Mirena IUD)  No prior  fractures     Past Surgical History   Past Surgical History:   Procedure Laterality Date    CHOLECYSTECTOMY      COLONOSCOPY  2018    GYN SURGERY      ORTHOPEDIC SURGERY  2016    toe surgery         Current Medications    Current Outpatient Medications:     dexAMETHasone (DECADRON) 4 MG/ML injection, Inject 1 mL (4 mg) into the muscle once as needed for other (adrenal crisis) Use 4 mg or dose determined by provider for iontophoresis., Disp: 1 mL, Rfl: 2    docusate sodium (COLACE) 50 MG capsule, Take 50 mg by mouth as needed for constipation, Disp: , Rfl:     fludrocortisone (FLORINEF) 0.1 MG tablet, TAKE ONE TO THREE TABLETS BY MOUTH DAILY AS NEEDED, Disp: 270 tablet, Rfl: 4    ibuprofen (ADVIL/MOTRIN) 200 MG tablet, Take 200 mg by mouth every 4 hours as needed for pain, Disp: , Rfl:     levothyroxine (SYNTHROID/LEVOTHROID) 150 MCG tablet, Take 1 tablet (150 mcg) by mouth daily, Disp: 90 tablet, Rfl: 4    mirtazapine (REMERON) 7.5 MG tablet, Take 1 tablet (7.5 mg) by mouth at bedtime, Disp: 90 tablet, Rfl: 3    predniSONE (DELTASONE) 5 MG tablet, Take 3 tablets (15 mg) by mouth daily, THEN 2 tablets (10 mg) daily, THEN 1 tablet (5 mg) daily. TAKE ONE TO ONE AND ONE-HALF TABLETS BY MOUTH DAILY, Disp: , Rfl:     promethazine (PHENERGAN) 25 MG tablet, Take 1 tablet (25 mg) by mouth every 6 hours as needed for nausea, Disp: 20 tablet, Rfl: 0    valACYclovir (VALTREX) 1000 mg tablet, Take 1 tablet (1,000 mg) by mouth 2 times daily (Patient not taking: Reported on 6/5/2024), Disp: 60 tablet, Rfl: 1    Current Facility-Administered Medications:     0.5 mL ropivacaine (NAROPIN) injection 5 mg/mL, 0.5 mL, , , , 0.5 mL at 05/17/24 0830    0.5 mL ropivacaine (NAROPIN) injection 5 mg/mL, 0.5 mL, , , , 0.5 mL at 05/17/24 0830    0.5 mL ropivacaine (NAROPIN) injection 5 mg/mL, 0.5 mL, , , , 0.5 mL at 05/17/24 0832    0.5 mL ropivacaine (NAROPIN) injection 5 mg/mL, 0.5 mL, , , , 0.5 mL at 05/17/24 0833    methylPREDNISolone  (DEPO-Medrol) injection 20 mg, 20 mg, , , , 20 mg at 05/17/24 0830    methylPREDNISolone (DEPO-Medrol) injection 20 mg, 20 mg, , , , 20 mg at 05/17/24 0830    methylPREDNISolone (DEPO-Medrol) injection 40 mg, 40 mg, , , , 40 mg at 05/17/24 0833    methylPREDNISolone (DEPO-Medrol) injection 40 mg, 40 mg, , , , 40 mg at 05/17/24 0832           Family History   Problem Relation Age of Onset    Hypertension Mother      Arrhythmia Mother      Hypertension Brother      Anxiety Disorder Brother      Substance Abuse Brother      Thyroid Disease Brother      Asthma Nephew     Brother diagnosed with cyclical vomiting syndrome (? related to marijuana).  Brother diagnosed with hypothyroidism.      Social History  .  She has 3 children.  She denies smoking, drinking alcohol or using illicit drugs. 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      Review of Systems   Systemic:             Longstanding fatigue, weight has been stable  Eye:                      No eye symptoms   Kelli-Laryngeal:     No kelli-laryngeal symptoms, no dysphagia, no hoarseness, no cough     Breast:                  No breast symptoms  Cardiovascular:    No cardiovascular symptoms, no CP or palpitations   Pulmonary:           No pulmonary symptoms, no SOB or cough    Gastrointestinal:   As above  Genitourinary:       increased thirst, no increased urination   Endocrine:            No endocrine symptoms, no cold or heat intolerance   Neurological:        No neurological symptoms, no headaches, no tremor, no numbness or tingling sensation, no dizziness   Musculoskeletal: Muscle cramps in her legs and feet, charley horse cramping, some osteoarthritis in her feet  Skin:                     No skin symptoms, no dry skin, no hair falling out   Psychological:     No psychological symptoms                 BP Readings from Last 6 Encounters:   08/07/24 117/81   06/06/24 93/48   06/05/24 (!) 150/90   05/17/24 112/68   08/30/23 101/71   05/10/23 106/74     Wt  Readings from Last 10 Encounters:   08/07/24 69.4 kg (153 lb)   06/05/24 67.1 kg (147 lb 14.9 oz)   06/05/24 67.1 kg (148 lb)   05/17/24 68.9 kg (152 lb)   08/30/23 68.9 kg (152 lb)   05/19/23 68.9 kg (152 lb)   12/10/22 68.9 kg (152 lb)   05/18/22 70.3 kg (155 lb)   07/29/21 69.4 kg (152 lb 14.4 oz)   01/08/21 70.4 kg (155 lb 3.2 oz)     /81 (BP Location: Left arm, Patient Position: Sitting, Cuff Size: Adult Regular)   Pulse 65   Resp 18   Wt 69.4 kg (153 lb)   SpO2 98%   BMI 22.59 kg/m      Lying down /68 pulse 65  Standing BP 98/68 pulse 65   Standing 3 minutes 110/79 pulse 65     Physical Exam  General Appearance: she is well developed, well nourished and in no distress     Eyes:                         conjutivae and extra-ocular motions are normal.                                    pupils round and reactive to light, no lid lag, no stare    HEENT:                             oropharynx clear and moist, no JVD, no bruits                                             no thyromegaly, no palpable nodules   Cardiovascular:               regular rhythm, no murmurs, distal pulse palpable, no edema  Respiratory:                    chest clear, no rales, no rhonchi   Gastrointestinal:        abdomen soft, non-tender, non-distended, normal bowel sounds, no organomegaly  Musculoskeletal:       normal tone and strength  Psychological:          affect and judgment normal  Skin:                         benign looking abdominal striae, skin appears hyperpigmented in sun exposed areas  Neurological:             reflexes normal and symmetric, no resting tremor.      35 minutes spent on the date of the encounter doing chart review, history and exam, documentation and further activities as noted above.  The longitudinal plan of care for the diagnosis(es)/condition(s) as documented were addressed during this visit. Due to the added complexity in care, I will continue to support Triny in the subsequent management  and with ongoing continuity of care.

## 2024-08-07 NOTE — PATIENT INSTRUCTIONS
Welcome to the [facility]     Our Endocrinology Clinics are here to provide you with a team-based, collaborative approach in the diagnosis and treatment of patients with diabetes and endocrine disorders. The team is made up of Physicians, Physician Assistants, Certified Diabetes Educators, Registered Nurses, Medical Assistants, Emergency Medical Technicians, and many others, all of whom have the unified goal of providing our patients with high quality care.     Please see below for some helpful tips to best navigate and use the [facility]:     [place] Respect: At [facility], we are committed to a respectful and safe space for all patients, visitors, and staff.  We believe that mutual respect between patients and their care team is the foundation of quality care.  It is our expectation that you will be treated with respect by your care team.  In turn, we ask that all communication with the care team (written and verbal) be respectful and free from profanity, threatening, or abusive language.  Disrespectful communication undermines our therapeutic relationship with you and may result in us being unable to continue to provide your care.    Refills: A provider must see you at least annually to prescribe and refill medications. This is to ensure your safety as well as meet insurance and compliance regulations.    Scheduling: Many of our Providers offer both in-person or video visits. Please call to schedule any needed follow ups as soon as possible because our provider schedules fill up very quickly. Our care team has the right to require an in-person visit when they believe that it is medically necessary. Please remember that for any virtual visits, you must be in the [facility] at the time of the visit, otherwise we are unable to see you and you will need to be rescheduled.    Missed Appointments: If you need to cancel or miss your  scheduled appointment, please call the clinic at [phone] to reschedule.  Please note if you repeatedly miss appointments or repeatedly miss appointments without calling to inform us ahead of time (no-show), the clinic may elect to not allow you to reschedule without speaking to a manager, may require a Partnership In Care Agreement prior to rescheduling, or could result in you no longer being able to receive care from the clinic. Providing the clinic with timely notification if you have to miss an appointment, allows us to better serve the needs of all of our patients.    Primary Care Provider: Our Endocrinologists are Specialists in their field. We expect you to have a Primary Care Provider established to handle any needs outside of your diabetes and endocrine care.  We would be happy to assist you find a Primary Care Provider, if you do not have one.    ClickTale: ClickTale is a wonderful resource that allows you access to your Care Team via online or the akash. Please ask a member of the team if you would like help creating an account. Please note that it may take up to 2 business days for a response. ClickTale messages are not reviewed on weekends or after business hours.  Emergent or urgent care needs should never be communicated via ClickTale.  If you experience a medical emergency call 911 or go to the nearest emergency room.    Labs: It is recommended that you stay within the [facility] for labs but you are welcome to obtain ordered labs (with some exceptions) from any location of your choice as long as they are able to complete and process the needed labs. If you need us to fax orders to your preferred lab, please provide us the name and fax number of the lab you would like to go to so we can fax the orders. If your labs are drawn outside of the [facility], please have them fax the results to [phone] ([place]) or [phone] ([place]) or via [facility]. It is your  responsibility to ensure that outside lab results are sent to us.    We look forward to working with you. Please do not hesitate to reach out with any questions.    Thank you,    The Endocrine Team    [facility] Address:   [place] Address:     [address] [place] [address]    Phone: [phone]  Fax: [phone] [address] [place] [address]    Phone: [phone]  Fax: [phone]     [facility] Cost Estimate Phone Number: [phone]    General Lab and Imaging Scheduling Phone Number: [phone]

## 2024-08-07 NOTE — NURSING NOTE
Triny Florian's goals for this visit include:   Chief Complaint   Patient presents with    Follow Up     Post hosp [place]'s crisis       She requests these members of her care team be copied on today's visit information: yes    PCP: Mishel Mosher    Referring Provider:  Mishel Mosher MD  [address]  [place] [address]    /81 (BP Location: Left arm, Patient Position: Sitting, Cuff Size: Adult Regular)   Pulse 65   Resp 18   Wt 69.4 kg (153 lb)   SpO2 98%   BMI 22.59 kg/m      Do you need any medication refills at today's visit? None    Alton Mims, EMT

## 2024-08-14 ENCOUNTER — MYC MEDICAL ADVICE (OUTPATIENT)
Dept: PODIATRY | Facility: CLINIC | Age: 54
End: 2024-08-14
Payer: COMMERCIAL

## 2024-08-15 ENCOUNTER — LAB (OUTPATIENT)
Dept: LAB | Facility: CLINIC | Age: 54
End: 2024-08-15
Payer: COMMERCIAL

## 2024-08-15 DIAGNOSIS — E06.3 HASHIMOTO'S THYROIDITIS: ICD-10-CM

## 2024-08-15 DIAGNOSIS — E27.1 AUTOIMMUNE ADDISON'S DISEASE (H): ICD-10-CM

## 2024-08-15 DIAGNOSIS — R53.83 OTHER FATIGUE: ICD-10-CM

## 2024-08-15 DIAGNOSIS — Z13.220 SCREENING FOR HYPERLIPIDEMIA: Primary | ICD-10-CM

## 2024-08-15 LAB
ANION GAP SERPL CALCULATED.3IONS-SCNC: 9 MMOL/L (ref 7–15)
BUN SERPL-MCNC: 12.7 MG/DL (ref 6–20)
CALCIUM SERPL-MCNC: 9.4 MG/DL (ref 8.8–10.4)
CHLORIDE SERPL-SCNC: 103 MMOL/L (ref 98–107)
CREAT SERPL-MCNC: 0.84 MG/DL (ref 0.51–0.95)
EGFRCR SERPLBLD CKD-EPI 2021: 81 ML/MIN/1.73M2
GLUCOSE SERPL-MCNC: 100 MG/DL (ref 70–99)
HCO3 SERPL-SCNC: 27 MMOL/L (ref 22–29)
POTASSIUM SERPL-SCNC: 4.2 MMOL/L (ref 3.4–5.3)
SODIUM SERPL-SCNC: 139 MMOL/L (ref 135–145)
T4 FREE SERPL-MCNC: 1.71 NG/DL (ref 0.9–1.7)
TSH SERPL DL<=0.005 MIU/L-ACNC: 0.89 UIU/ML (ref 0.3–4.2)
VIT B12 SERPL-MCNC: 597 PG/ML (ref 232–1245)

## 2024-08-15 PROCEDURE — 99000 SPECIMEN HANDLING OFFICE-LAB: CPT

## 2024-08-15 PROCEDURE — 84439 ASSAY OF FREE THYROXINE: CPT

## 2024-08-15 PROCEDURE — 82607 VITAMIN B-12: CPT

## 2024-08-15 PROCEDURE — 82024 ASSAY OF ACTH: CPT

## 2024-08-15 PROCEDURE — 80048 BASIC METABOLIC PNL TOTAL CA: CPT

## 2024-08-15 PROCEDURE — 84443 ASSAY THYROID STIM HORMONE: CPT

## 2024-08-15 PROCEDURE — 84244 ASSAY OF RENIN: CPT | Mod: 90

## 2024-08-15 PROCEDURE — 86364 TISS TRNSGLTMNASE EA IG CLAS: CPT

## 2024-08-15 PROCEDURE — 36415 COLL VENOUS BLD VENIPUNCTURE: CPT

## 2024-08-15 NOTE — TELEPHONE ENCOUNTER
Please see patient's request for orthotics orders and advise if these can be ordered.     Sofi Watson, MOSHE, LAT, ATC  Certified Athletic Trainer

## 2024-08-16 LAB
ACTH PLAS-MCNC: 36 PG/ML
TTG IGA SER-ACNC: 0.3 U/ML

## 2024-08-18 LAB — RENIN PLAS-CCNC: 6.8 NG/ML/HR

## 2024-08-19 DIAGNOSIS — E27.1 AUTOIMMUNE ADDISON'S DISEASE (H): Primary | ICD-10-CM

## 2024-08-19 RX ORDER — PREDNISONE 1 MG/1
1 TABLET ORAL DAILY
Qty: 90 TABLET | Refills: 3 | Status: SHIPPED | OUTPATIENT
Start: 2024-08-19 | End: 2024-09-18

## 2024-08-19 NOTE — RESULT ENCOUNTER NOTE
The labs are looking good.  They are not indicative of prednisone or fludrocortisone deficiency.  I think it is reasonable to minimally increase the dose of prednisone to 6 mg daily, on a trial basis.  I did place a prescription for 1 mg prednisone tablets.  Please let me know if you feel any different, in 3 to 4 weeks.

## 2024-09-04 ENCOUNTER — OFFICE VISIT (OUTPATIENT)
Dept: FAMILY MEDICINE | Facility: CLINIC | Age: 54
End: 2024-09-04
Attending: FAMILY MEDICINE
Payer: COMMERCIAL

## 2024-09-04 VITALS
RESPIRATION RATE: 16 BRPM | DIASTOLIC BLOOD PRESSURE: 70 MMHG | OXYGEN SATURATION: 98 % | HEIGHT: 69 IN | HEART RATE: 66 BPM | BODY MASS INDEX: 23.22 KG/M2 | TEMPERATURE: 97.4 F | SYSTOLIC BLOOD PRESSURE: 114 MMHG | WEIGHT: 156.8 LBS

## 2024-09-04 DIAGNOSIS — M19.071 ARTHRITIS OF BOTH FEET: ICD-10-CM

## 2024-09-04 DIAGNOSIS — E27.1 AUTOIMMUNE ADDISON'S DISEASE (H): ICD-10-CM

## 2024-09-04 DIAGNOSIS — M19.072 ARTHRITIS OF BOTH FEET: ICD-10-CM

## 2024-09-04 DIAGNOSIS — E06.3 HYPOTHYROIDISM DUE TO HASHIMOTO'S THYROIDITIS: ICD-10-CM

## 2024-09-04 DIAGNOSIS — Z00.00 ROUTINE GENERAL MEDICAL EXAMINATION AT A HEALTH CARE FACILITY: Primary | ICD-10-CM

## 2024-09-04 DIAGNOSIS — M20.5X1 HALLUX LIMITUS, RIGHT: ICD-10-CM

## 2024-09-04 PROBLEM — E27.2 ADDISONIAN CRISIS (H): Status: RESOLVED | Noted: 2024-06-05 | Resolved: 2024-09-04

## 2024-09-04 PROCEDURE — 99396 PREV VISIT EST AGE 40-64: CPT | Performed by: FAMILY MEDICINE

## 2024-09-04 ASSESSMENT — PAIN SCALES - GENERAL: PAINLEVEL: NO PAIN (0)

## 2024-09-04 NOTE — PROGRESS NOTES
Preventive Care Visit  [facility]  Mishel Mosher MD, Family Medicine  Sep 4, 2024      Assessment & Plan     Problem List as of 9/4/2024 Reviewed: 9/4/2024  4:22 PM by Mishel Mosher MD            Noted       High    1. 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  This year: tough year as son Alfred who has autism struggling, trying to find place.  Otherwise they are all doing well.  Current medical problems significant for autoimmune lavon's disease diagnosed in 1998 and autoimmune hypothyroidism now being followed by [facility]:  Mammo: due.  Last 6/2023.  Ordered.   Pap: up to date. Last 9/2023, due 9/2028  Colon: up to date.  Done 9/2018, due 2028.  Dexa: should do given history of thyroid and lavon's.  Ordered.  Immunizations: Influenza done.  Labs: Lipid profile low risk ASCVD 1.6% 2023. Due again 5 years 2028.  Discussed healthy lifestyle and healthy aging recommendations including the importance of:  regular exercise & weight lifting  adequate and regular sleep, at least 7 hrs nightly   5+ fruits and veggies daily, whole grains, limit processed food          Relevant Orders     MA Screen Bilateral w/Jarrett       Medium    2. Arthritis of both feet 1/3/2024     Overview Addendum 9/4/2024  4:22 PM by Mishel Mosher MD      09/04/2024 A/P:  Getting injections Dr. Yeo - helpful           3. Autoimmune Lavon's disease (H) 1/1/1998     Overview Addendum 9/4/2024  3:55 PM by Mishel Mosher MD      09/04/2024 A/P:  Now following with Dr. Milo wilson at [facility]    History:   diagnosed in 1998.  was on TPN.  thought was hyperemesis.  long  diagnosis - difficult to diagnose.    Dr. Johns yearly - [facility].  Confirmed with antibody testing          Relevant Orders     DX Bone Density    4. Hallux limitus, right 1/3/2024     Overview Signed 9/4/2024  4:22 PM by Mishel Mosher MD      09/04/2024 A/P:  Injections with Dr. Yeo.  Had surgery as well ~5+ years ago           5. Hypothyroidism due to Hashimoto's thyroiditis 8/7/2024     Overview Signed 9/4/2024  3:56 PM by Mishel Mosher MD      09/04/2024 A/P:  Now following with Dr. Milo wilson.            Relevant Orders     DX Bone Density                 Counseling  Appropriate preventive services were addressed with this patient via screening, questionnaire, or discussion as appropriate for fall prevention, nutrition, physical activity, Tobacco-use cessation, social engagement, weight loss and cognition.  Checklist reviewing preventive services available has been given to the patient.  Reviewed patient's diet, addressing concerns and/or questions.           Jaydon Agosto is a 54 year old, presenting for the following:  Physical        9/4/2024     3:36 PM   Additional Questions   Roomed by Astrid Carvajal   Accompanied by Self        Via the Health Maintenance questionnaire, the patient has reported the following services have been completed -Mammogram: [facility] 2023-05-31, this information has been sent to the abstraction team.  Health Care Directive  Patient does not have a Health Care Directive or Living Will: Patient states has Advance Directive and will bring in a copy to clinic.    HPI              9/2/2024   General Health   How would you rate your overall physical health? Excellent   Feel stress (tense, anxious, or unable to sleep) Not at all            9/2/2024   Nutrition   Three or more servings of calcium each day? (!) NO   Diet: Regular (no restrictions)   How many servings of fruit and vegetables per day? (!) 2-3   How many sweetened beverages each day? 0-1             9/2/2024   Exercise   Days per week of moderate/strenous exercise 5 days   Average minutes spent exercising at this level 60 min            9/2/2024   Social Factors   Frequency of gathering with friends or relatives Twice a week   Worry food won't last until get money to buy more No   Food not last or not have enough money for food? No   Do you have housing? (Housing is defined as stable permanent housing and does not include staying ouside in a car, in a tent, in an abandoned building, in an overnight shelter, or couch-surfing.) Yes   Are you worried about losing your housing? No   Lack of transportation? No   Unable to get utilities (heat,electricity)? No            9/2/2024   Fall Risk   Fallen 2 or more times in the past year? No   Trouble with walking or balance? No             9/2/2024   Dental   Dentist two times every year? Yes            9/2/2024   TB Screening   Were you born outside of the [place]? No          Today's PHQ-9 Score:       9/4/2024     3:28 PM   PHQ-9 SCORE   PHQ-9 Total Score MyChart 1 (Minimal depression)   PHQ-9 Total Score 1         9/2/2024   Substance Use   Alcohol more than 3/day or more than 7/wk No   Do you use any other substances recreationally? No        Social History     Tobacco Use    Smoking status: Never    Smokeless tobacco: Never   Vaping Use    Vaping status: Never Used   Substance Use Topics    Alcohol use: Yes     Comment: socially    Drug use: No           8/30/2023   LAST FHS-7 RESULTS   1st degree relative breast or ovarian cancer No   Any relative bilateral breast cancer No   Any male have breast cancer Yes   Any ONE woman have BOTH breast AND ovarian cancer No   Any woman with breast cancer before 50yrs No   2 or more relatives with breast AND/OR ovarian cancer No   2 or more relatives with breast AND/OR bowel cancer No                   9/2/2024   STI Screening   New sexual partner(s) since last STI/HIV test? No        History of abnormal Pap smear: No -  "age 30-64 HPV with reflex Pap every 5 years recommended        Latest Ref Rng & Units 11/29/2017     8:15 AM 11/29/2017     8:00 AM 1/14/2015    12:00 AM   PAP / HPV   PAP (Historical)  NIL   NIL    HPV 16 DNA NEG^Negative  Negative     HPV 18 DNA NEG^Negative  Negative     Other HR HPV NEG^Negative  Negative       ASCVD Risk   The 10-year ASCVD risk score (Franc MCCAIN, et al., 2019) is: 1.6%    Values used to calculate the score:      Age: 54 years      Sex: Female      Is Non- : No      Diabetic: No      Tobacco smoker: No      Systolic Blood Pressure: 114 mmHg      Is BP treated: No      HDL Cholesterol: 75 mg/dL      Total Cholesterol: 235 mg/dL           Reviewed and updated as needed this visit by Provider      Problems                      Objective    Exam  /70   Pulse 66   Temp 97.4  F (36.3  C) (Temporal)   Resp 16   Ht 1.753 m (5' 9\")   Wt 71.1 kg (156 lb 12.8 oz)   LMP  (LMP Unknown)   SpO2 98%   Breastfeeding No   BMI 23.16 kg/m     Estimated body mass index is 23.16 kg/m  as calculated from the following:    Height as of this encounter: 1.753 m (5' 9\").    Weight as of this encounter: 71.1 kg (156 lb 12.8 oz).    Physical Exam  GENERAL: alert and no distress  EYES: Eyes grossly normal to inspection, PERRL and conjunctivae and sclerae normal  HENT: ear canals and TM's normal, nose and mouth without ulcers or lesions  NECK: no adenopathy, no asymmetry, masses, or scars  RESP: lungs clear to auscultation - no rales, rhonchi or wheezes  CV: regular rate and rhythm, normal S1 S2, no S3 or S4, no murmur, click or rub, no peripheral edema  ABDOMEN: soft, nontender, no hepatosplenomegaly, no masses and bowel sounds normal  MS: no gross musculoskeletal defects noted, no edema  SKIN: no suspicious lesions or rashes  NEURO: Normal strength and tone, mentation intact and speech normal  PSYCH: mentation appears normal, affect normal/bright        Signed Electronically " by: Mishel Mosher MD    Answers submitted by the patient for this visit:  Patient Health Questionnaire (Submitted on 9/4/2024)  If you checked off any problems, how difficult have these problems made it for you to do your work, take care of things at home, or get along with other people?: Not difficult at all  PHQ9 TOTAL SCORE: 1

## 2024-09-04 NOTE — PATIENT INSTRUCTIONS
Patient Education   Preventive Care Advice   This is general advice given by our system to help you stay healthy. However, your care team may have specific advice just for you. Please talk to your care team about your preventive care needs.  Nutrition  Eat 5 or more servings of fruits and vegetables each day.  Try wheat bread, brown rice and whole grain pasta (instead of white bread, rice, and pasta).  Get enough calcium and vitamin D. Check the label on foods and aim for 100% of the RDA (recommended daily allowance).  Lifestyle  Exercise at least 150 minutes each week  (30 minutes a day, 5 days a week).  Do muscle strengthening activities 2 days a week. These help control your weight and prevent disease.  No smoking.  Wear sunscreen to prevent skin cancer.  Have a dental exam and cleaning every 6 months.  Yearly exams  See your health care team every year to talk about:  Any changes in your health.  Any medicines your care team has prescribed.  Preventive care, family planning, and ways to prevent chronic diseases.  Shots (vaccines)   HPV shots (up to age 26), if you've never had them before.  Hepatitis B shots (up to age 59), if you've never had them before.  COVID-19 shot: Get this shot when it's due.  Flu shot: Get a flu shot every year.  Tetanus shot: Get a tetanus shot every 10 years.  Pneumococcal, hepatitis A, and RSV shots: Ask your care team if you need these based on your risk.  Shingles shot (for age 50 and up)  General health tests  Diabetes screening:  Starting at age 35, Get screened for diabetes at least every 3 years.  If you are younger than age 35, ask your care team if you should be screened for diabetes.  Cholesterol test: At age 39, start having a cholesterol test every 5 years, or more often if advised.  Bone density scan (DEXA): At age 50, ask your care team if you should have this scan for osteoporosis (brittle bones).  Hepatitis C: Get tested at least once in your life.  STIs (sexually  transmitted infections)  Before age 24: Ask your care team if you should be screened for STIs.  After age 24: Get screened for STIs if you're at risk. You are at risk for STIs (including HIV) if:  You are sexually active with more than one person.  You don't use condoms every time.  You or a partner was diagnosed with a sexually transmitted infection.  If you are at risk for HIV, ask about PrEP medicine to prevent HIV.  Get tested for HIV at least once in your life, whether you are at risk for HIV or not.  Cancer screening tests  Cervical cancer screening: If you have a cervix, begin getting regular cervical cancer screening tests starting at age 21.  Breast cancer scan (mammogram): If you've ever had breasts, begin having regular mammograms starting at age 40. This is a scan to check for breast cancer.  Colon cancer screening: It is important to start screening for colon cancer at age 45.  Have a colonoscopy test every 10 years (or more often if you're at risk) Or, ask your provider about stool tests like a FIT test every year or Cologuard test every 3 years.  To learn more about your testing options, visit:   .  For help making a decision, visit:   https://bit.ly/om04190.  Prostate cancer screening test: If you have a prostate, ask your care team if a prostate cancer screening test (PSA) at age 55 is right for you.  Lung cancer screening: If you are a current or former smoker ages 50 to 80, ask your care team if ongoing lung cancer screenings are right for you.  For informational purposes only. Not to replace the advice of your health care provider. Copyright   2023 [employer]. All rights reserved. Clinically reviewed by the [facility] Transitions Program. [employer] [id number] - REV 01/24.

## 2024-09-13 ENCOUNTER — OFFICE VISIT (OUTPATIENT)
Dept: PODIATRY | Facility: CLINIC | Age: 54
End: 2024-09-13
Payer: COMMERCIAL

## 2024-09-13 VITALS — DIASTOLIC BLOOD PRESSURE: 72 MMHG | SYSTOLIC BLOOD PRESSURE: 105 MMHG | WEIGHT: 145 LBS | BODY MASS INDEX: 21.41 KG/M2

## 2024-09-13 DIAGNOSIS — Q66.71 CONGENITAL BILATERAL PES CAVUS: Primary | ICD-10-CM

## 2024-09-13 DIAGNOSIS — M19.072 ARTHRITIS OF BOTH MIDFEET: ICD-10-CM

## 2024-09-13 DIAGNOSIS — Q66.72 CONGENITAL BILATERAL PES CAVUS: Primary | ICD-10-CM

## 2024-09-13 DIAGNOSIS — M20.5X1 HALLUX LIMITUS, RIGHT: ICD-10-CM

## 2024-09-13 DIAGNOSIS — M19.071 ARTHRITIS OF FIRST METATARSOPHALANGEAL (MTP) JOINT OF RIGHT FOOT: ICD-10-CM

## 2024-09-13 DIAGNOSIS — M19.071 ARTHRITIS OF BOTH MIDFEET: ICD-10-CM

## 2024-09-13 PROCEDURE — 99213 OFFICE O/P EST LOW 20 MIN: CPT | Performed by: PODIATRIST

## 2024-09-13 NOTE — PATIENT INSTRUCTIONS
Thank you for choosing [facility] Podiat[facility] / Foot & Ankle Surgery!    DR. RUIZ'S CLINIC LOCATIONS:     [facility] TRIAGE LINE: [phone]   [address] [place] [address] APPOINTMENTS: [phone]   [place] [address] RADIOLOGY: [phone]   (Every other Tues - Wed - Fri PM) SET UP SURGERY: [phone]    PHYSICAL THERAPY: [phone]   [facility] BILLING QUESTIONS: [phone]   [address] FAX: [phone]   [place] [address]    (Thurs & Fri AM)         [facility] LOCATIONS  [facility]- [address]  [place] [address]  Phone: [phone]  Fax: [phone] [facility]   [address]  [place] [address]  Phone: [phone]  Fax: [phone]   [facility] [place]  [address]  [place] [address]  Phone: [phone]  Fax: [phone] [facility]  [address]  [place] [address]  Phone: [phone]   Fax: [phone]   * Please call any location listed to make an appointment for a casting/fitting. Your referral was sent to their central office and they will all have the order on file.

## 2024-09-13 NOTE — LETTER
9/13/2024      Triny Florian  [address] [facility] [address]      Dear Colleague,    Thank you for referring your patient, Triny Florian, to the [facility]. Please see a copy of my visit note below.    ASSESSMENT:  Encounter Diagnoses   Name Primary?     Congenital bilateral pes cavus Yes     Hallux limitus, right      Arthritis of first metatarsophalangeal (MTP) joint of right foot      Arthritis of both midfeet      MEDICAL DECISION MAKING:  Considering her previous foot pain and known regions of arthritis, Triny is doing well and remaining very active in life.    She has found that a particular model of [employer] shoes provides comfort.  She is requesting a referral for new custom molded orthoses which provide comfort.  The referral was provided.  She periodically follows up with Dr. Yeo of sports medicine for injections and is pleased with the results.    No new findings on clinical exam today.    She did report some intermittent lateral column pain of the left foot.  This is likely pressure related, given her high medial longitudinal arch.  There is no pain with testing the peroneus brevis.  Quality shoes and new custom orthoses should help.    Follow-up on an as-needed basis.    Disclaimer: This note consists of symbols derived from keyboarding, dictation and/or voice recognition software. As a result, there may be errors in the script that have gone undetected. Please consider this when interpreting information found in this chart.    Caleb Campo DPM, FACFAS, [facility] Department of Podiatry/Foot & Ankle Surgery      ____________________________________________________________________    HPI:       Triny presents reporting pain in both feet.  This has existed for years.  Aching, rated a 5 out of 10 and daily  More pain after periods of rest.  Treatment to date:  Orthoses, physical therapy, surgery, ibuprofen and injection therapy.  Previous right foot  injections included the first metatarsophalangeal joint, third tarsometatarsal joint, and fourth tarsometatarsal joint.   Left foot injections include, left third tarsometatarsal joint and fourth tarsometatarsal joint     I last evaluated Triny in May 2023.  Diagnoses include arthritis of the right first metatarsophalangeal joint and bilateral midfoot arthritis.    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  *  Past Medical History:   Diagnosis Date     Acute bronchospasm 02/27/2020     Addisonian crisis (H24) 06/05/2024     Addisons disease (H)      Arthritis 2016    great toes, thumbs     CARDIOVASCULAR SCREENING; LDL GOAL LESS THAN 160 05/23/2011     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 central line placement when being diagnosed     Excessive or frequent menstruation 01/01/2005    Overview:   heavy, long, then spotting       Hepatitis 11/10/2006     Herpes simplex virus (HSV) infection 01/01/1986    Overview:   sun, illness flare       History of blood transfusion 1998     Other diseases of lung, not elsewhere classified 03/21/2007    Overview:  pulm nodule: ANNAMARIA suprahilar 7 mm non calcified noted on CXR after TB exposure at work  CT scheduled-     Other specified hypothyroidism 05/23/2011     Painful scar 05/23/2012    where feeding tube is     Thyroid disease 1995    hypothyroid   *  *  Past Surgical History:   Procedure Laterality Date     CHOLECYSTECTOMY       COLONOSCOPY  2018     GYN SURGERY       ORTHOPEDIC SURGERY  2016    toe surgery   *  *  Current Outpatient Medications   Medication Sig Dispense Refill     dexAMETHasone (DECADRON) 4 MG/ML injection Inject 1 mL (4 mg) into the muscle once as needed for other (adrenal crisis) Use 4 mg or dose determined by provider for iontophoresis. 1 mL 2     docusate sodium (COLACE) 50 MG capsule Take 50 mg by mouth as needed for constipation       fludrocortisone (FLORINEF) 0.1 MG  tablet Take 1 tablet (0.1 mg) by mouth daily 270 tablet 3     ibuprofen (ADVIL/MOTRIN) 200 MG tablet Take 200 mg by mouth every 4 hours as needed for pain       levothyroxine (SYNTHROID/LEVOTHROID) 150 MCG tablet Take 1 tablet (150 mcg) by mouth daily 90 tablet 4     mirtazapine (REMERON) 7.5 MG tablet Take 1 tablet (7.5 mg) by mouth at bedtime 90 tablet 3     predniSONE (DELTASONE) 1 MG tablet Take 1 tablet (1 mg) by mouth daily Take in addition to the 5 mg tablets, for a total daily dose of 6 mg. 90 tablet 3     predniSONE (DELTASONE) 5 MG tablet One tablet daily plus extra dosages for stressful situations. 110 tablet 3     promethazine (PHENERGAN) 25 MG tablet Take 1 tablet (25 mg) by mouth every 6 hours as needed for nausea 20 tablet 0         EXAM:    Vitals: LMP  (LMP Unknown)   BMI: There is no height or weight on file to calculate BMI.    Vascular:  Pedal pulses are palpable for both the DP and PT arteries.  CFT < 3 sec.  No edema.       Neuro: Light touch sensation is intact to the L4, L5, S1 distributions  No evidence of weakness, spasticity, or contracture in the lower extremities.      Derm: Normal texture and turgor.  No erythema, ecchymosis, or cyanosis.  No open lesions.      Musculoskeletal:    Lower extremity muscle strength is normal. No gross deformities. Higher medial longitudinal arch.  No pain reproduced on palpatory exam of the left foot.    Also with loading of the right forefoot, significant limitation of hallux dorsiflexion.  There is limitation on the left, but to a lesser degree.     X-Ray Findings:  I personally reviewed the right foot images.  Please see comments above     10/11/2022 MRI RIGHT FOOT:      Impression:  1. Severe degenerative change of first metatarsal-phalangeal joint,  third and fourth tarsometatarsal joints.  2. Query stress reaction at the third and fourth metatarsal bases.  3.Approximately 5 mm in mediolateral dimension plantar fat effacement  at the first interspace,  underlying small interdigital neuroma cannot  be excluded.     JUANY PRESSLEY         Again, thank you for allowing me to participate in the care of your patient.        Sincerely,        Caleb Campo DPM

## 2024-09-13 NOTE — PROGRESS NOTES
ASSESSMENT:  Encounter Diagnoses   Name Primary?    Congenital bilateral pes cavus Yes    Hallux limitus, right     Arthritis of first metatarsophalangeal (MTP) joint of right foot     Arthritis of both midfeet      MEDICAL DECISION MAKING:  Considering her previous foot pain and known regions of arthritis, Triny is doing well and remaining very active in life.    She has found that a particular model of [employer] shoes provides comfort.  She is requesting a referral for new custom molded orthoses which provide comfort.  The referral was provided.  She periodically follows up with Dr. Yeo of sports medicine for injections and is pleased with the results.    No new findings on clinical exam today.    She did report some intermittent lateral column pain of the left foot.  This is likely pressure related, given her high medial longitudinal arch.  There is no pain with testing the peroneus brevis.  Quality shoes and new custom orthoses should help.    Follow-up on an as-needed basis.    Disclaimer: This note consists of symbols derived from keyboarding, dictation and/or voice recognition software. As a result, there may be errors in the script that have gone undetected. Please consider this when interpreting information found in this chart.    Caleb Campo DPM, FACFAS, MS    [place] Department of Podiatry/Foot & Ankle Surgery      ____________________________________________________________________    HPI:       Triny presents reporting pain in both feet.  This has existed for years.  Aching, rated a 5 out of 10 and daily  More pain after periods of rest.  Treatment to date:  Orthoses, physical therapy, surgery, ibuprofen and injection therapy.  Previous right foot injections included the first metatarsophalangeal joint, third tarsometatarsal joint, and fourth tarsometatarsal joint.   Left foot injections include, left third tarsometatarsal joint and fourth tarsometatarsal joint     I last evaluated Triny in May  2023.  Diagnoses include arthritis of the right first metatarsophalangeal joint and bilateral midfoot arthritis.    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  *  Past Medical History:   Diagnosis Date    Acute bronchospasm 02/27/2020    Addisonian crisis (H24) 06/05/2024    Addisons disease (H)     Arthritis 2016    great toes, thumbs    CARDIOVASCULAR SCREENING; LDL GOAL LESS THAN 160 05/23/2011    Corticoadrenal insufficiency 05/23/2011    [facility]            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 central line placement when being diagnosed    Excessive or frequent menstruation 01/01/2005    Overview:   heavy, long, then spotting      Hepatitis 11/10/2006    Herpes simplex virus (HSV) infection 01/01/1986    Overview:   sun, illness flare      History of blood transfusion 1998    Other diseases of lung, not elsewhere classified 03/21/2007    Overview:  pulm nodule: ANNAMARIA suprahilar 7 mm non calcified noted on CXR after TB exposure at work  CT scheduled-    Other specified hypothyroidism 05/23/2011    Painful scar 05/23/2012    where feeding tube is    Thyroid disease 1995    hypothyroid   *  *  Past Surgical History:   Procedure Laterality Date    CHOLECYSTECTOMY      COLONOSCOPY  2018    GYN SURGERY      ORTHOPEDIC SURGERY  2016    toe surgery   *  *  Current Outpatient Medications   Medication Sig Dispense Refill    dexAMETHasone (DECADRON) 4 MG/ML injection Inject 1 mL (4 mg) into the muscle once as needed for other (adrenal crisis) Use 4 mg or dose determined by provider for iontophoresis. 1 mL 2    docusate sodium (COLACE) 50 MG capsule Take 50 mg by mouth as needed for constipation      fludrocortisone (FLORINEF) 0.1 MG tablet Take 1 tablet (0.1 mg) by mouth daily 270 tablet 3    ibuprofen (ADVIL/MOTRIN) 200 MG tablet Take 200 mg by mouth every 4 hours as needed for pain      levothyroxine (SYNTHROID/LEVOTHROID) 150 MCG tablet Take 1 tablet (150 mcg) by mouth daily 90 tablet 4    mirtazapine  (REMERON) 7.5 MG tablet Take 1 tablet (7.5 mg) by mouth at bedtime 90 tablet 3    predniSONE (DELTASONE) 1 MG tablet Take 1 tablet (1 mg) by mouth daily Take in addition to the 5 mg tablets, for a total daily dose of 6 mg. 90 tablet 3    predniSONE (DELTASONE) 5 MG tablet One tablet daily plus extra dosages for stressful situations. 110 tablet 3    promethazine (PHENERGAN) 25 MG tablet Take 1 tablet (25 mg) by mouth every 6 hours as needed for nausea 20 tablet 0         EXAM:    Vitals: LMP  (LMP Unknown)   BMI: There is no height or weight on file to calculate BMI.    Vascular:  Pedal pulses are palpable for both the DP and PT arteries.  CFT < 3 sec.  No edema.       Neuro: Light touch sensation is intact to the L4, L5, S1 distributions  No evidence of weakness, spasticity, or contracture in the lower extremities.      Derm: Normal texture and turgor.  No erythema, ecchymosis, or cyanosis.  No open lesions.      Musculoskeletal:    Lower extremity muscle strength is normal. No gross deformities. Higher medial longitudinal arch.  No pain reproduced on palpatory exam of the left foot.    Also with loading of the right forefoot, significant limitation of hallux dorsiflexion.  There is limitation on the left, but to a lesser degree.     X-Ray Findings:  I personally reviewed the right foot images.  Please see comments above     10/11/2022 MRI RIGHT FOOT:      Impression:  1. Severe degenerative change of first metatarsal-phalangeal joint,  third and fourth tarsometatarsal joints.  2. Query stress reaction at the third and fourth metatarsal bases.  3.Approximately 5 mm in mediolateral dimension plantar fat effacement  at the first interspace, underlying small interdigital neuroma cannot  be excluded.     JUANY PRESSLEY

## 2024-09-14 ENCOUNTER — HEALTH MAINTENANCE LETTER (OUTPATIENT)
Age: 54
End: 2024-09-14

## 2024-09-15 ENCOUNTER — MYC MEDICAL ADVICE (OUTPATIENT)
Dept: FAMILY MEDICINE | Facility: CLINIC | Age: 54
End: 2024-09-15
Payer: COMMERCIAL

## 2024-09-17 ENCOUNTER — HOSPITAL ENCOUNTER (OUTPATIENT)
Dept: MAMMOGRAPHY | Facility: CLINIC | Age: 54
Discharge: HOME | End: 2024-09-17
Attending: FAMILY MEDICINE | Admitting: FAMILY MEDICINE
Payer: COMMERCIAL

## 2024-09-17 DIAGNOSIS — Z00.00 ROUTINE GENERAL MEDICAL EXAMINATION AT A HEALTH CARE FACILITY: ICD-10-CM

## 2024-09-17 PROCEDURE — 77063 BREAST TOMOSYNTHESIS BI: CPT

## 2024-09-17 NOTE — TELEPHONE ENCOUNTER
Writer replied to patient via [employer].  FABIO CoxN, RN (she/her)  [facility] RN

## 2024-09-18 ENCOUNTER — MYC REFILL (OUTPATIENT)
Dept: FAMILY MEDICINE | Facility: CLINIC | Age: 54
End: 2024-09-18
Payer: COMMERCIAL

## 2024-09-18 DIAGNOSIS — G47.00 INSOMNIA, UNSPECIFIED TYPE: ICD-10-CM

## 2024-09-18 RX ORDER — MIRTAZAPINE 7.5 MG/1
7.5 TABLET, FILM COATED ORAL
Qty: 90 TABLET | Refills: 3 | OUTPATIENT
Start: 2024-09-18

## 2024-09-18 RX ORDER — MIRTAZAPINE 7.5 MG/1
7.5 TABLET, FILM COATED ORAL AT BEDTIME
Qty: 90 TABLET | Refills: 3 | Status: SHIPPED | OUTPATIENT
Start: 2024-09-18

## 2024-09-24 DIAGNOSIS — E27.1 AUTOIMMUNE ADDISON'S DISEASE (H): ICD-10-CM

## 2024-09-24 RX ORDER — PROMETHAZINE HYDROCHLORIDE 25 MG/1
25 TABLET ORAL EVERY 6 HOURS PRN
Qty: 20 TABLET | Refills: 0 | Status: SHIPPED | OUTPATIENT
Start: 2024-09-24

## 2024-09-30 ENCOUNTER — OFFICE VISIT (OUTPATIENT)
Dept: ORTHOPEDICS | Facility: CLINIC | Age: 54
End: 2024-09-30
Payer: COMMERCIAL

## 2024-09-30 ENCOUNTER — ANCILLARY PROCEDURE (OUTPATIENT)
Dept: BONE DENSITY | Facility: CLINIC | Age: 54
End: 2024-09-30
Attending: FAMILY MEDICINE
Payer: COMMERCIAL

## 2024-09-30 VITALS
HEIGHT: 69 IN | SYSTOLIC BLOOD PRESSURE: 112 MMHG | WEIGHT: 145 LBS | DIASTOLIC BLOOD PRESSURE: 68 MMHG | BODY MASS INDEX: 21.48 KG/M2

## 2024-09-30 DIAGNOSIS — M19.072 ARTHRITIS OF BOTH FEET: Primary | ICD-10-CM

## 2024-09-30 DIAGNOSIS — E06.3 HYPOTHYROIDISM DUE TO HASHIMOTO'S THYROIDITIS: ICD-10-CM

## 2024-09-30 DIAGNOSIS — E27.1 AUTOIMMUNE ADDISON'S DISEASE (H): ICD-10-CM

## 2024-09-30 DIAGNOSIS — M19.071 ARTHRITIS OF BOTH FEET: Primary | ICD-10-CM

## 2024-09-30 PROCEDURE — 20604 DRAIN/INJ JOINT/BURSA W/US: CPT | Mod: 50 | Performed by: FAMILY MEDICINE

## 2024-09-30 PROCEDURE — 77080 DXA BONE DENSITY AXIAL: CPT | Mod: TC | Performed by: PHYSICIAN ASSISTANT

## 2024-09-30 RX ORDER — ROPIVACAINE HYDROCHLORIDE 5 MG/ML
0.5 INJECTION, SOLUTION EPIDURAL; INFILTRATION; PERINEURAL
Status: SHIPPED | OUTPATIENT
Start: 2024-09-30

## 2024-09-30 RX ORDER — METHYLPREDNISOLONE ACETATE 40 MG/ML
20 INJECTION, SUSPENSION INTRA-ARTICULAR; INTRALESIONAL; INTRAMUSCULAR; SOFT TISSUE
Status: SHIPPED | OUTPATIENT
Start: 2024-09-30

## 2024-09-30 RX ADMIN — ROPIVACAINE HYDROCHLORIDE 0.5 ML: 5 INJECTION, SOLUTION EPIDURAL; INFILTRATION; PERINEURAL at 16:02

## 2024-09-30 RX ADMIN — METHYLPREDNISOLONE ACETATE 20 MG: 40 INJECTION, SUSPENSION INTRA-ARTICULAR; INTRALESIONAL; INTRAMUSCULAR; SOFT TISSUE at 16:04

## 2024-09-30 RX ADMIN — METHYLPREDNISOLONE ACETATE 20 MG: 40 INJECTION, SUSPENSION INTRA-ARTICULAR; INTRALESIONAL; INTRAMUSCULAR; SOFT TISSUE at 16:02

## 2024-09-30 RX ADMIN — ROPIVACAINE HYDROCHLORIDE 0.5 ML: 5 INJECTION, SOLUTION EPIDURAL; INFILTRATION; PERINEURAL at 16:04

## 2024-09-30 NOTE — PATIENT INSTRUCTIONS
1. Arthritis of both feet      -Patient is following up for chronic bilateral foot pain due to arthritis  -Patient tolerated bilateral third TMT and left fourth TMT intra-articular cortisone injections today without complications.  Patient was given postprocedure instructions  -Patient will follow-up when pain returns  -Call direct clinic number [[phone]] at any time with questions or concerns.    Albert Yeo MD [facility]  [facility] [place]

## 2024-09-30 NOTE — LETTER
9/30/2024      Triny Florian  [address] [facility] [address]      Dear Colleague,    Thank you for referring your patient, Triny Florian, to the [facility] [place]. Please see a copy of my visit note below.    ASSESSMENT & PLAN  Patient Instructions     1. Arthritis of both feet      -Patient is following up for chronic bilateral foot pain due to arthritis  -Patient tolerated bilateral third TMT and left fourth TMT intra-articular cortisone injections today without complications.  Patient was given postprocedure instructions  -Patient will follow-up when pain returns  -Call direct clinic number [[phone]] at any time with questions or concerns.    Albert Yeo MD [facility] Orthoped[facility] and Sports Medicine  [facility]        -----    SUBJECTIVE:  Triny Florian is a 54 year old female who is seen for bilateral foot pain. Patient is requesting repeat injections. Patient previously had corticosteroid injection - bilateral third and left fourth TMT joint and right plantar fascia (most recent: 5/17/24) that provided  4 month(s) of relief.    Small Joint Injection/Arthrocentesis: bilateral intertarsal    Date/Time: 9/30/2024 4:02 PM    Performed by: Yeo, Albert, MD  Authorized by: Yeo, Albert, MD  Indications:  Pain  Needle Size:  25 G  Guidance: ultrasound     Approach:  Dorsal  Location:  Foot   Location comment:  Bilateral 3rd TMT joints  Laterality:  Bilateral  Site:  Bilateral intertarsal  Medications (Right):  20 mg methylPREDNISolone 40 MG/ML; 0.5 mL ROPivacaine 5 MG/ML        Medications (Left):  20 mg methylPREDNISolone 40 MG/ML; 0.5 mL ROPivacaine 5 MG/ML                Outcome:  Tolerated well, no immediate complications  Procedure discussed: discussed risks, benefits, and alternatives    Consent Given by:  Patient  Timeout: timeout called immediately prior to procedure    Prep: patient was prepped and draped in usual sterile fashion        Ultrasound was used to ensure safe and accurate needle placement and injection. Ultrasound images of the procedure were permanently stored.      Small Joint Injection/Arthrocentesis: L intertarsal    Date/Time: 9/30/2024 4:04 PM    Performed by: Yeo, Albert, MD  Authorized by: Yeo, Albert, MD  Indications:  Pain  Needle Size:  25 G  Guidance: ultrasound     Approach:  Dorsal  Location:  Foot   Location comment:  Left 4th TMT joint    Site:  L intertarsal                  Medications:  20 mg methylPREDNISolone 40 MG/ML; 0.5 mL ROPivacaine 5 MG/ML        Outcome:  Tolerated well, no immediate complications  Procedure discussed: discussed risks, benefits, and alternatives    Consent Given by:  Patient  Timeout: timeout called immediately prior to procedure    Prep: patient was prepped and draped in usual sterile fashion       Ultrasound was used to ensure safe and accurate needle placement and injection. Ultrasound images of the procedure were permanently stored.              Albert Yeo MD, [facility]      Again, thank you for allowing me to participate in the care of your patient.        Sincerely,        Albert Yeo, MD

## 2024-09-30 NOTE — PROGRESS NOTES
ASSESSMENT & PLAN  Patient Instructions     1. Arthritis of both feet      -Patient is following up for chronic bilateral foot pain due to arthritis  -Patient tolerated bilateral third TMT and left fourth TMT intra-articular cortisone injections today without complications.  Patient was given postprocedure instructions  -Patient will follow-up when pain returns  -Call direct clinic number [[phone]] at any time with questions or concerns.    Albert Yeo MD [facility] and [facility] Medicine  [facility]        -----    SUBJECTIVE:  Triny Florian is a 54 year old female who is seen for bilateral foot pain. Patient is requesting repeat injections. Patient previously had corticosteroid injection - bilateral third and left fourth TMT joint and right plantar fascia (most recent: 5/17/24) that provided  4 month(s) of relief.    Small Joint Injection/Arthrocentesis: bilateral intertarsal    Date/Time: 9/30/2024 4:02 PM    Performed by: Yeo, Albert, MD  Authorized by: Yeo, Albert, MD  Indications:  Pain  Needle Size:  25 G  Guidance: ultrasound     Approach:  Dorsal  Location:  Foot   Location comment:  Bilateral 3rd TMT joints  Laterality:  Bilateral  Site:  Bilateral intertarsal  Medications (Right):  20 mg methylPREDNISolone 40 MG/ML; 0.5 mL ROPivacaine 5 MG/ML        Medications (Left):  20 mg methylPREDNISolone 40 MG/ML; 0.5 mL ROPivacaine 5 MG/ML                Outcome:  Tolerated well, no immediate complications  Procedure discussed: discussed risks, benefits, and alternatives    Consent Given by:  Patient  Timeout: timeout called immediately prior to procedure    Prep: patient was prepped and draped in usual sterile fashion       Ultrasound was used to ensure safe and accurate needle placement and injection. Ultrasound images of the procedure were permanently stored.      Small Joint Injection/Arthrocentesis: L intertarsal    Date/Time: 9/30/2024 4:04 PM    Performed by: Yeo,  MD Yobani  Authorized by: Yeo, Albert, MD  Indications:  Pain  Needle Size:  25 G  Guidance: ultrasound     Approach:  Dorsal  Location:  Foot   Location comment:  Left 4th TMT joint    Site:  L intertarsal                  Medications:  20 mg methylPREDNISolone 40 MG/ML; 0.5 mL ROPivacaine 5 MG/ML        Outcome:  Tolerated well, no immediate complications  Procedure discussed: discussed risks, benefits, and alternatives    Consent Given by:  Patient  Timeout: timeout called immediately prior to procedure    Prep: patient was prepped and draped in usual sterile fashion       Ultrasound was used to ensure safe and accurate needle placement and injection. Ultrasound images of the procedure were permanently stored.              Albert Yeo MD, [facility]

## 2024-10-15 ENCOUNTER — ANCILLARY PROCEDURE (OUTPATIENT)
Dept: GENERAL RADIOLOGY | Facility: CLINIC | Age: 54
End: 2024-10-15
Attending: FAMILY MEDICINE
Payer: COMMERCIAL

## 2024-10-15 ENCOUNTER — OFFICE VISIT (OUTPATIENT)
Dept: ORTHOPEDICS | Facility: CLINIC | Age: 54
End: 2024-10-15
Payer: COMMERCIAL

## 2024-10-15 VITALS
HEIGHT: 69 IN | BODY MASS INDEX: 21.48 KG/M2 | DIASTOLIC BLOOD PRESSURE: 59 MMHG | WEIGHT: 145 LBS | SYSTOLIC BLOOD PRESSURE: 96 MMHG

## 2024-10-15 DIAGNOSIS — M19.032 LOCALIZED PRIMARY OSTEOARTHRITIS OF CARPOMETACARPAL (CMC) JOINT OF LEFT WRIST: ICD-10-CM

## 2024-10-15 DIAGNOSIS — M65.352 TRIGGER LITTLE FINGER OF LEFT HAND: ICD-10-CM

## 2024-10-15 DIAGNOSIS — M19.031 LOCALIZED PRIMARY OSTEOARTHRITIS OF CARPOMETACARPAL (CMC) JOINT OF RIGHT WRIST: ICD-10-CM

## 2024-10-15 DIAGNOSIS — M79.642 BILATERAL HAND PAIN: Primary | ICD-10-CM

## 2024-10-15 DIAGNOSIS — M79.642 BILATERAL HAND PAIN: ICD-10-CM

## 2024-10-15 DIAGNOSIS — M79.641 BILATERAL HAND PAIN: ICD-10-CM

## 2024-10-15 DIAGNOSIS — M79.641 BILATERAL HAND PAIN: Primary | ICD-10-CM

## 2024-10-15 PROCEDURE — 73130 X-RAY EXAM OF HAND: CPT | Mod: TC | Performed by: FAMILY MEDICINE

## 2024-10-15 PROCEDURE — 99214 OFFICE O/P EST MOD 30 MIN: CPT | Mod: 25 | Performed by: FAMILY MEDICINE

## 2024-10-15 PROCEDURE — 20604 DRAIN/INJ JOINT/BURSA W/US: CPT | Mod: FA | Performed by: FAMILY MEDICINE

## 2024-10-15 RX ADMIN — ROPIVACAINE HYDROCHLORIDE 1 ML: 5 INJECTION, SOLUTION EPIDURAL; INFILTRATION; PERINEURAL at 10:39

## 2024-10-15 RX ADMIN — METHYLPREDNISOLONE ACETATE 40 MG: 40 INJECTION, SUSPENSION INTRA-ARTICULAR; INTRALESIONAL; INTRAMUSCULAR; SOFT TISSUE at 10:39

## 2024-10-15 NOTE — PROGRESS NOTES
ASSESSMENT & PLAN  Patient Instructions     1. Bilateral hand pain    2. Localized primary osteoarthritis of carpometacarpal (CMC) joint of left wrist    3. Localized primary osteoarthritis of carpometacarpal (CMC) joint of right wrist      -Patient has bilateral wrist pain due to CMC arthritis  -Patient tolerated left CMC intra-articular cortisone injection today without complications.  Patient was given postprocedure instruction  -Patient may follow-up for potential left fifth digit trigger finger injection if locking becomes more frequent and during daytime hours.  -Patient may follow-up for a right CMC injection if pain worsens.  -Orders placed for hand therapy to mold a custom finger splint and teach home exercise program.  -Call direct clinic number [[phone]] at any time with questions or concerns.    Albert Yeo MD [facility] Orthopedics and Sports Medicine  [facility]        -----    SUBJECTIVE  Triny Florian is a/an 54 year old Right handed female who is seen as a self referral for evaluation of bilateral hand pain. The patient is seen by themselves.    Onset: 3 years(s) ago. Reports insidious onset without acute precipitating event.  Location of Pain: bilateral CMC (L>R)  Rating of Pain at worst: 5/10  Rating of Pain Currently: 2/10  Worsened by: Use of hands   Better with: Rest  Treatments tried: rest/activity avoidance  Associated symptoms: locking or catching of the left 5th finger   Orthopedic history: NO  Relevant surgical history: NO  Social history: social history: works at nurse anesthetist     Past Medical History:   Diagnosis Date    Acute bronchospasm 02/27/2020    Addisonian crisis (H) 06/05/2024    Addisons disease (H)     Arthritis 2016    great toes, thumbs    CARDIOVASCULAR SCREENING; LDL GOAL LESS THAN 160 05/23/2011    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 central line placement when being diagnosed     Excessive or frequent menstruation 01/01/2005    Overview:   heavy, long, then spotting      Hepatitis 11/10/2006    Herpes simplex virus (HSV) infection 01/01/1986    Overview:   sun, illness flare      History of blood transfusion 1998    Other diseases of lung, not elsewhere classified 03/21/2007    Overview:  pulm nodule: ANNAMARIA suprahilar 7 mm non calcified noted on CXR after TB exposure at work  CT scheduled-    Other specified hypothyroidism 05/23/2011    Painful scar 05/23/2012    where feeding tube is    Thyroid disease 1995    hypothyroid     Social History     Socioeconomic History    Marital status:     Number of children: 3   Tobacco Use    Smoking status: Never    Smokeless tobacco: Never   Vaping Use    Vaping status: Never Used   Substance and Sexual Activity    Alcohol use: Yes     Comment: socially    Drug use: No    Sexual activity: Yes     Partners: Male     Birth control/protection: I.U.D.   Other Topics Concern    Parent/sibling w/ CABG, MI or angioplasty before 65F 55M? No     Social Determinants of Health     Financial Resource Strain: Low Risk  (9/2/2024)    Financial Resource Strain     Within the past 12 months, have you or your family members you live with been unable to get utilities (heat, electricity) when it was really needed?: No   Food Insecurity: Low Risk  (9/2/2024)    Food Insecurity     Within the past 12 months, did you worry that your food would run out before you got money to buy more?: No     Within the past 12 months, did the food you bought just not last and you didn t have money to get more?: No   Transportation Needs: Low Risk  (9/2/2024)    Transportation Needs     Within the past 12 months, has lack of transportation kept you from medical appointments, getting your medicines, non-medical meetings or appointments, work, or from getting things that you need?: No   Physical Activity: Sufficiently Active (9/2/2024)    Exercise Vital Sign     Days of Exercise per Week:  "5 days     Minutes of Exercise per Session: 60 min   Stress: No Stress Concern Present (9/2/2024)    [employer] - Occupational Stress Questionnaire     Feeling of Stress : Not at all   Social Connections: Unknown (9/2/2024)    Social Connection and Isolation Panel [NHANES]     Frequency of Social Gatherings with Friends and Family: Twice a week   Interpersonal Safety: Low Risk  (9/4/2024)    Interpersonal Safety     Do you feel physically and emotionally safe where you currently live?: Yes     Within the past 12 months, have you been hit, slapped, kicked or otherwise physically hurt by someone?: No     Within the past 12 months, have you been humiliated or emotionally abused in other ways by your partner or ex-partner?: No   Housing Stability: Low Risk  (9/2/2024)    Housing Stability     Do you have housing? : Yes     Are you worried about losing your housing?: No         Patient's past medical, surgical, social, and family histories were reviewed today and no changes are noted.    REVIEW OF SYSTEMS:  10 point ROS is negative other than symptoms noted above in HPI, Past Medical History or as stated below  Constitutional: NEGATIVE for fever, chills, change in weight  Skin: NEGATIVE for worrisome rashes, moles or lesions  GI/: NEGATIVE for bowel or bladder changes  Neuro: NEGATIVE for weakness, dizziness or paresthesias    OBJECTIVE:  BP 96/59   Ht 1.753 m (5' 9\")   Wt 65.8 kg (145 lb)   LMP  (LMP Unknown)   BMI 21.41 kg/m     General: healthy, alert and in no distress  HEENT: no scleral icterus or conjunctival erythema  Skin: no suspicious lesions or rash. No jaundice.  CV: regular rhythm by palpation  Resp: normal respiratory effort without conversational dyspnea   Psych: normal mood and affect  Gait: normal steady gait with appropriate coordination and balance  Neuro: normal light touch sensory exam of the bilateral hands.    MSK:  BILATERAL HAND  Inspection:    No swelling or " obvious deformity or asymmetry  Palpation:   Carpals: normal   Metacarpals: normal   Thumb: CMC   Fingers: normal  Range of Motion:    Full active flexion and extension at MCP, PIP, and DIP joints; normal finger cascade without malrotation.  Wrist pronation, supination, and ulnar/radial deviation normal.  Strength:    Grossly intact  Special Tests:    Positive: CMC grind      Independent visualization of the below image:  No results found for this or any previous visit (from the past 24 hour(s)).    Personal review of bilateral wrist x-rays taken office today show joint space narrowing and osteophytes of the first CMC joints.  No acute fracture or dislocation.    Hand / Upper Extremity Injection/Arthrocentesis: L thumb CMC    Date/Time: 10/15/2024 10:39 AM    Performed by: Yeo, Albert, MD  Authorized by: Yeo, Albert, MD    Indications:  Pain  Needle Size:  25 G  Guidance: ultrasound    Approach:  Volar  Condition: osteoarthritis    Location:  Thumb  Site:  L thumb CMC    Medications:  40 mg methylPREDNISolone 40 MG/ML; 1 mL ROPivacaine 5 MG/ML  Outcome:  Tolerated well, no immediate complications  Procedure discussed: discussed risks, benefits, and alternatives    Consent Given by:  Patient  Timeout: timeout called immediately prior to procedure    Prep: patient was prepped and draped in usual sterile fashion     Ultrasound was used to ensure safe and accurate needle placement and injection. Ultrasound images of the procedure were permanently stored.          Albert Yeo MD [facility] and Orthopedic [facility]

## 2024-10-15 NOTE — LETTER
10/15/2024      Triny Florian  [address] [address]      Dear Colleague,    Thank you for referring your patient, Triny Florian, to the [facility] [place]. Please see a copy of my visit note below.    ASSESSMENT & PLAN  Patient Instructions     1. Bilateral hand pain    2. Localized primary osteoarthritis of carpometacarpal (CMC) joint of left wrist    3. Localized primary osteoarthritis of carpometacarpal (CMC) joint of right wrist      -Patient has bilateral wrist pain due to CMC arthritis  -Patient tolerated left CMC intra-articular cortisone injection today without complications.  Patient was given postprocedure instruction  -Patient may follow-up for potential left fifth digit trigger finger injection if locking becomes more frequent and during daytime hours.  -Patient may follow-up for a right CMC injection if pain worsens.  -Orders placed for hand therapy to mold a custom finger splint and teach home exercise program.  -Call direct clinic number [[phone]] at any time with questions or concerns.    Albert Yeo MD [facility] Orthopedics and Sports Medicine  [facility] [place]        -----    SUBJECTIVE  Triny Florian is a/an 54 year old Right handed female who is seen as a self referral for evaluation of bilateral hand pain. The patient is seen by themselves.    Onset: 3 years(s) ago. Reports insidious onset without acute precipitating event.  Location of Pain: bilateral CMC (L>R)  Rating of Pain at worst: 5/10  Rating of Pain Currently: 2/10  Worsened by: Use of hands   Better with: Rest  Treatments tried: rest/activity avoidance  Associated symptoms: locking or catching of the left 5th finger   Orthopedic history: NO  Relevant surgical history: NO  Social history: social history: works at nurse anesthetist     Past Medical History:   Diagnosis Date     Acute bronchospasm 02/27/2020     Addisonian crisis (H) 06/05/2024      Addisons disease (H)      Arthritis 2016    great toes, thumbs     CARDIOVASCULAR SCREENING; LDL GOAL LESS THAN 160 05/23/2011     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 central line placement when being diagnosed     Excessive or frequent menstruation 01/01/2005    Overview:   heavy, long, then spotting       Hepatitis 11/10/2006     Herpes simplex virus (HSV) infection 01/01/1986    Overview:   sun, illness flare       History of blood transfusion 1998     Other diseases of lung, not elsewhere classified 03/21/2007    Overview:  pulm nodule: ANNAMARIA suprahilar 7 mm non calcified noted on CXR after TB exposure at work  CT scheduled-     Other specified hypothyroidism 05/23/2011     Painful scar 05/23/2012    where feeding tube is     Thyroid disease 1995    hypothyroid     Social History     Socioeconomic History     Marital status:      Number of children: 3   Tobacco Use     Smoking status: Never     Smokeless tobacco: Never   Vaping Use     Vaping status: Never Used   Substance and Sexual Activity     Alcohol use: Yes     Comment: socially     Drug use: No     Sexual activity: Yes     Partners: Male     Birth control/protection: I.U.D.   Other Topics Concern     Parent/sibling w/ CABG, MI or angioplasty before 65F 55M? No     Social Determinants of Health     Financial Resource Strain: Low Risk  (9/2/2024)    Financial Resource Strain      Within the past 12 months, have you or your family members you live with been unable to get utilities (heat, electricity) when it was really needed?: No   Food Insecurity: Low Risk  (9/2/2024)    Food Insecurity      Within the past 12 months, did you worry that your food would run out before you got money to buy more?: No      Within the past 12 months, did the food you bought just not last and you didn t have money to get more?: No   Transportation Needs: Low Risk  (9/2/2024)    Transportation Needs      Within the past 12  "months, has lack of transportation kept you from medical appointments, getting your medicines, non-medical meetings or appointments, work, or from getting things that you need?: No   Physical Activity: Sufficiently Active (9/2/2024)    Exercise Vital Sign      Days of Exercise per Week: 5 days      Minutes of Exercise per Session: 60 min   Stress: No Stress Concern Present (9/2/2024)    [employer] - Occupational Stress Questionnaire      Feeling of Stress : Not at all   Social Connections: Unknown (9/2/2024)    Social Connection and Isolation Panel [NHANES]      Frequency of Social Gatherings with Friends and Family: Twice a week   Interpersonal Safety: Low Risk  (9/4/2024)    Interpersonal Safety      Do you feel physically and emotionally safe where you currently live?: Yes      Within the past 12 months, have you been hit, slapped, kicked or otherwise physically hurt by someone?: No      Within the past 12 months, have you been humiliated or emotionally abused in other ways by your partner or ex-partner?: No   Housing Stability: Low Risk  (9/2/2024)    Housing Stability      Do you have housing? : Yes      Are you worried about losing your housing?: No         Patient's past medical, surgical, social, and family histories were reviewed today and no changes are noted.    REVIEW OF SYSTEMS:  10 point ROS is negative other than symptoms noted above in HPI, Past Medical History or as stated below  Constitutional: NEGATIVE for fever, chills, change in weight  Skin: NEGATIVE for worrisome rashes, moles or lesions  GI/: NEGATIVE for bowel or bladder changes  Neuro: NEGATIVE for weakness, dizziness or paresthesias    OBJECTIVE:  BP 96/59   Ht 1.753 m (5' 9\")   Wt 65.8 kg (145 lb)   LMP  (LMP Unknown)   BMI 21.41 kg/m     General: healthy, alert and in no distress  HEENT: no scleral icterus or conjunctival erythema  Skin: no suspicious lesions or rash. No jaundice.  CV: regular rhythm " by palpation  Resp: normal respiratory effort without conversational dyspnea   Psych: normal mood and affect  Gait: normal steady gait with appropriate coordination and balance  Neuro: normal light touch sensory exam of the bilateral hands.    MSK:  BILATERAL HAND  Inspection:    No swelling or obvious deformity or asymmetry  Palpation:   Carpals: normal   Metacarpals: normal   Thumb: CMC   Fingers: normal  Range of Motion:    Full active flexion and extension at MCP, PIP, and DIP joints; normal finger cascade without malrotation.  Wrist pronation, supination, and ulnar/radial deviation normal.  Strength:    Grossly intact  Special Tests:    Positive: CMC grind      Independent visualization of the below image:  No results found for this or any previous visit (from the past 24 hour(s)).    Personal review of bilateral wrist x-rays taken office today show joint space narrowing and osteophytes of the first CMC joints.  No acute fracture or dislocation.    Hand / Upper Extremity Injection/Arthrocentesis: L thumb CMC    Date/Time: 10/15/2024 10:39 AM    Performed by: Yeo, Albert, MD  Authorized by: Yeo, Albert, MD    Indications:  Pain  Needle Size:  25 G  Guidance: ultrasound    Approach:  Volar  Condition: osteoarthritis    Location:  Thumb  Site:  L thumb CMC    Medications:  40 mg methylPREDNISolone 40 MG/ML; 1 mL ROPivacaine 5 MG/ML  Outcome:  Tolerated well, no immediate complications  Procedure discussed: discussed risks, benefits, and alternatives    Consent Given by:  Patient  Timeout: timeout called immediately prior to procedure    Prep: patient was prepped and draped in usual sterile fashion     Ultrasound was used to ensure safe and accurate needle placement and injection. Ultrasound images of the procedure were permanently stored.          Albert Yeo MD [facility]      Again, thank you for allowing me to participate in the care of your patient.         Sincerely,        Albert Yeo, MD

## 2024-10-16 RX ORDER — METHYLPREDNISOLONE ACETATE 40 MG/ML
40 INJECTION, SUSPENSION INTRA-ARTICULAR; INTRALESIONAL; INTRAMUSCULAR; SOFT TISSUE
Status: COMPLETED | OUTPATIENT
Start: 2024-10-15 | End: 2024-10-15

## 2024-10-16 RX ORDER — ROPIVACAINE HYDROCHLORIDE 5 MG/ML
1 INJECTION, SOLUTION EPIDURAL; INFILTRATION; PERINEURAL
Status: COMPLETED | OUTPATIENT
Start: 2024-10-15 | End: 2024-10-15

## 2024-10-16 NOTE — PATIENT INSTRUCTIONS
1. Bilateral hand pain    2. Localized primary osteoarthritis of carpometacarpal (CMC) joint of left wrist    3. Localized primary osteoarthritis of carpometacarpal (CMC) joint of right wrist      -Patient has bilateral wrist pain due to CMC arthritis  -Patient tolerated left CMC intra-articular cortisone injection today without complications.  Patient was given postprocedure instruction  -Patient may follow-up for potential left fifth digit trigger finger injection if locking becomes more frequent and during daytime hours.  -Patient may follow-up for a right CMC injection if pain worsens.  -Orders placed for hand therapy to mold a custom finger splint and teach home exercise program.  -Call direct clinic number [[phone]] at any time with questions or concerns.    Albert Yeo MD [facility]  [facility] [place]

## 2024-10-25 ENCOUNTER — MYC MEDICAL ADVICE (OUTPATIENT)
Dept: FAMILY MEDICINE | Facility: CLINIC | Age: 54
End: 2024-10-25
Payer: COMMERCIAL

## 2024-10-25 NOTE — TELEPHONE ENCOUNTER
Writer replied to patient via [employer].  FABOI CoxN, RN (she/her)  [facility] RN

## 2024-12-14 ENCOUNTER — MYC REFILL (OUTPATIENT)
Dept: FAMILY MEDICINE | Facility: CLINIC | Age: 54
End: 2024-12-14
Payer: COMMERCIAL

## 2024-12-14 DIAGNOSIS — E27.1 AUTOIMMUNE ADDISON'S DISEASE (H): ICD-10-CM

## 2024-12-15 RX ORDER — PROMETHAZINE HYDROCHLORIDE 25 MG/1
25 TABLET ORAL EVERY 6 HOURS PRN
Qty: 40 TABLET | Refills: 2 | Status: SHIPPED | OUTPATIENT
Start: 2024-12-15

## 2024-12-15 RX ORDER — PROMETHAZINE HYDROCHLORIDE 25 MG/1
25 TABLET ORAL EVERY 6 HOURS PRN
Qty: 20 TABLET | Refills: 0 | OUTPATIENT
Start: 2024-12-15

## 2024-12-19 ENCOUNTER — OFFICE VISIT (OUTPATIENT)
Dept: ORTHOPEDICS | Facility: CLINIC | Age: 54
End: 2024-12-19
Payer: COMMERCIAL

## 2024-12-19 VITALS
SYSTOLIC BLOOD PRESSURE: 112 MMHG | HEIGHT: 69 IN | WEIGHT: 145 LBS | BODY MASS INDEX: 21.48 KG/M2 | DIASTOLIC BLOOD PRESSURE: 74 MMHG

## 2024-12-19 DIAGNOSIS — M19.071 ARTHRITIS OF BOTH MIDFEET: Primary | ICD-10-CM

## 2024-12-19 DIAGNOSIS — M19.072 ARTHRITIS OF BOTH MIDFEET: Primary | ICD-10-CM

## 2024-12-19 NOTE — PATIENT INSTRUCTIONS
1. Arthritis of both midfeet      -Patient is following up for chronic bilateral midfoot pain due to arthritis  -Patient was hoping for repeat midfoot cortisone injections.  However, patient only had 2 months of relief from her last injections and we are only 10 weeks out from her last injection and so we deferred that at this time.  -Patient has experienced declining response to cortisone injections with every treatment and I do not feel that this is a sustainable treatment option at this time  -Patient was advised to follow-up with Dr. Campo who she has seen previously for her chronic foot pain  -Patient is on chronic prednisone for other health issues.  Patient may increase her prednisone dose to either 10 or 20 mg daily for 1 week to calm down the inflammation pain of her feet  -Patient may then try over-the-counter topical Voltaren gel to apply to both feet 4 times a day for optimal relief.  Patient may also take extra Tylenol for breakthrough pain.  If patient notes improvement in pain with Voltaren gel, she may try the extra strength version she can purchase online that only needs to be applied twice daily  -Patient will restart home exercise program to strengthen and stabilize her feet and tolerates extended hours of weightbearing activities.  To consider spending more time barefoot when she is at home and exercising to build up the strength and tolerance of her foot muscles and joints.  I would still recommend wearing her custom orthotics when she is at work all day  -Call direct clinic number [[phone]] at any time with questions or concerns.    Albert Yeo MD [facility] Orthopedics and Sports Medicine  [facility] [place]

## 2024-12-19 NOTE — PROGRESS NOTES
ASSESSMENT & PLAN  Patient Instructions     1. Arthritis of both midfeet      -Patient is following up for chronic bilateral midfoot pain due to arthritis  -Patient was hoping for repeat midfoot cortisone injections.  However, patient only had 2 months of relief from her last injections and we are only 10 weeks out from her last injection and so we deferred that at this time.  -Patient has experienced declining response to cortisone injections with every treatment and I do not feel that this is a sustainable treatment option at this time  -Patient was advised to follow-up with Dr. Campo who she has seen previously for her chronic foot pain  -Patient is on chronic prednisone for other health issues.  Patient may increase her prednisone dose to either 10 or 20 mg daily for 1 week to calm down the inflammation pain of her feet  -Patient may then try over-the-counter topical Voltaren gel to apply to both feet 4 times a day for optimal relief.  Patient may also take extra Tylenol for breakthrough pain.  If patient notes improvement in pain with Voltaren gel, she may try the extra strength version she can purchase online that only needs to be applied twice daily  -Patient will restart home exercise program to strengthen and stabilize her feet and tolerates extended hours of weightbearing activities.  To consider spending more time barefoot when she is at home and exercising to build up the strength and tolerance of her foot muscles and joints.  I would still recommend wearing her custom orthotics when she is at work all day  -Call direct clinic number [[phone]] at any time with questions or concerns.    Albert Yeo MD [facility] Orthopedics and Sports Medicine  [facility]        -----    SUBJECTIVE:  Triny Florian is a 54 year old female who is seen in follow-up for bilateral foot pain.They were last seen 09/30/2024.     Since their last visit reports worsening pain. They indicate that their  "current pain level is 2/10. They have tried rest/activity avoidance and corticosteroid injection (most recent date: 09/30/2024) that provided  2 month(s) of relief.      The patient is seen by themselves.    Patient's past medical, surgical, social, and family histories were reviewed today and no changes are noted.    REVIEW OF SYSTEMS:  Constitutional: NEGATIVE for fever, chills, change in weight  Skin: NEGATIVE for worrisome rashes, moles or lesions  GI/: NEGATIVE for bowel or bladder changes  Neuro: NEGATIVE for weakness, dizziness or paresthesias    OBJECTIVE:  /74   Ht 1.753 m (5' 9\")   Wt 65.8 kg (145 lb)   BMI 21.41 kg/m     General: healthy, alert and in no distress  HEENT: no scleral icterus or conjunctival erythema  Skin: no suspicious lesions or rash. No jaundice.  CV: regular rhythm by palpation, no pedal edema  Resp: normal respiratory effort without conversational dyspnea   Psych: normal mood and affect  Gait: normal steady gait with appropriate coordination and balance  Neuro: normal light touch sensory exam of the extremities.    MSK:  BILATERAL FOOT  Inspection:    no swelling over the dorsal midfoot  Palpation:    Tender about the 3rd, left 4th TMT joints  Range of Motion:     Active toe flexion and extension  - afrom    Active ankle range of motion - afrom    Passive ankle range of motion - from  Strength:    Intrinsic foot musculature strength - grossly intact    Ankle strength - grossly intact  Special Tests:    Positive: none    Independent visualization of the below image:      Albert Yeo MD, [facility]    "

## 2024-12-19 NOTE — LETTER
12/19/2024      Triny Florian  [address] [facility] [place] [address]      Dear Colleague,    Thank you for referring your patient, Triny Florian, to the [facility] [place]. Please see a copy of my visit note below.    ASSESSMENT & PLAN  Patient Instructions     1. Arthritis of both midfeet      -Patient is following up for chronic bilateral midfoot pain due to arthritis  -Patient was hoping for repeat midfoot cortisone injections.  However, patient only had 2 months of relief from her last injections and we are only 10 weeks out from her last injection and so we deferred that at this time.  -Patient has experienced declining response to cortisone injections with every treatment and I do not feel that this is a sustainable treatment option at this time  -Patient was advised to follow-up with Dr. Campo who she has seen previously for her chronic foot pain  -Patient is on chronic prednisone for other health issues.  Patient may increase her prednisone dose to either 10 or 20 mg daily for 1 week to calm down the inflammation pain of her feet  -Patient may then try over-the-counter topical Voltaren gel to apply to both feet 4 times a day for optimal relief.  Patient may also take extra Tylenol for breakthrough pain.  If patient notes improvement in pain with Voltaren gel, she may try the extra strength version she can purchase online that only needs to be applied twice daily  -Patient will restart home exercise program to strengthen and stabilize her feet and tolerates extended hours of weightbearing activities.  To consider spending more time barefoot when she is at home and exercising to build up the strength and tolerance of her foot muscles and joints.  I would still recommend wearing her custom orthotics when she is at work all day  -Call direct clinic number [[phone]] at any time with questions or concerns.    Albert Yeo MD [facility]  "[facility]        -----    SUBJECTIVE:  Triny Florian is a 54 year old female who is seen in follow-up for bilateral foot pain.They were last seen 09/30/2024.     Since their last visit reports worsening pain. They indicate that their current pain level is 2/10. They have tried rest/activity avoidance and corticosteroid injection (most recent date: 09/30/2024) that provided  2 month(s) of relief.      The patient is seen by themselves.    Patient's past medical, surgical, social, and family histories were reviewed today and no changes are noted.    REVIEW OF SYSTEMS:  Constitutional: NEGATIVE for fever, chills, change in weight  Skin: NEGATIVE for worrisome rashes, moles or lesions  GI/: NEGATIVE for bowel or bladder changes  Neuro: NEGATIVE for weakness, dizziness or paresthesias    OBJECTIVE:  /74   Ht 1.753 m (5' 9\")   Wt 65.8 kg (145 lb)   BMI 21.41 kg/m     General: healthy, alert and in no distress  HEENT: no scleral icterus or conjunctival erythema  Skin: no suspicious lesions or rash. No jaundice.  CV: regular rhythm by palpation, no pedal edema  Resp: normal respiratory effort without conversational dyspnea   Psych: normal mood and affect  Gait: normal steady gait with appropriate coordination and balance  Neuro: normal light touch sensory exam of the extremities.    MSK:  BILATERAL FOOT  Inspection:    no swelling over the dorsal midfoot  Palpation:    Tender about the 3rd, left 4th TMT joints  Range of Motion:     Active toe flexion and extension  - afrom    Active ankle range of motion - afrom    Passive ankle range of motion - from  Strength:    Intrinsic foot musculature strength - grossly intact    Ankle strength - grossly intact  Special Tests:    Positive: none    Independent visualization of the below image:      Albert Yeo MD, [facility]      Again, thank you for allowing me to participate in the care of your patient.  "       Sincerely,        Albert Yeo, MD

## 2025-01-08 ENCOUNTER — MYC MEDICAL ADVICE (OUTPATIENT)
Dept: FAMILY MEDICINE | Facility: CLINIC | Age: 55
End: 2025-01-08
Payer: COMMERCIAL

## 2025-01-08 ENCOUNTER — MYC MEDICAL ADVICE (OUTPATIENT)
Dept: PODIATRY | Facility: CLINIC | Age: 55
End: 2025-01-08
Payer: COMMERCIAL

## 2025-01-08 NOTE — LETTER
2025      Triny Florian  [address] [facility] [place] [address]      Re: FSA Reimbursement/Spending  Patient: Triny Florian  Policy ID#:  [id number]  : 1970      To Whom it May Concern:      I am writing to formally request a prior authorization of FSA coverage for my patient,  Triny Florian, for a smart scale.  I deem this to be medically necessary for her care.      Sincerely,        Mishel Mosher MD    Electronically signed

## 2025-01-09 NOTE — TELEPHONE ENCOUNTER
See patient message and advise if able to write letter, or if follow up appointment is needed first.     Saundra Sevilla, ATC

## 2025-01-09 NOTE — TELEPHONE ENCOUNTER
Dr. Mosher - letter pended for edits/review and signing if in agreement.    SURAJ Vargas BSN, PHN, AMB-BC (she/her)  [facility] RN

## 2025-04-08 ENCOUNTER — OFFICE VISIT (OUTPATIENT)
Dept: ORTHOPEDICS | Facility: CLINIC | Age: 55
End: 2025-04-08

## 2025-04-08 DIAGNOSIS — M72.2 PLANTAR FASCIITIS OF RIGHT FOOT: ICD-10-CM

## 2025-04-08 DIAGNOSIS — M20.5X1 HALLUX LIMITUS, RIGHT: ICD-10-CM

## 2025-04-08 DIAGNOSIS — M19.072 ARTHRITIS OF BOTH MIDFEET: Primary | ICD-10-CM

## 2025-04-08 DIAGNOSIS — M19.071 ARTHRITIS OF BOTH MIDFEET: Primary | ICD-10-CM

## 2025-04-08 PROCEDURE — 0232T NJX PLATELET PLASMA: CPT | Mod: GA | Performed by: FAMILY MEDICINE

## 2025-04-08 RX ORDER — HYDROCODONE BITARTRATE AND ACETAMINOPHEN 5; 325 MG/1; MG/1
1 TABLET ORAL EVERY 6 HOURS PRN
Qty: 12 TABLET | Refills: 0 | Status: SHIPPED | OUTPATIENT
Start: 2025-04-08

## 2025-04-08 NOTE — PATIENT INSTRUCTIONS
1. Arthritis of both midfeet    2. Plantar fasciitis of right foot    3. Hallux limitus, right      - Patient is following up for bilateral foot pain due to arthritis and right plantar fasciitis  -Patient tolerated right plantar fascia, bilateral 3rd and 4th TMT joints, left first TMT joints and the right first MTP joint PRP injections today without complications.  Patient was given postprocedure instructions  -Prescription for hydrocodone was sent to her pharmacy to take as needed for severe pain.  -Patient will follow-up in 1 to 2 weeks via telephone visit  -Call direct clinic number [[phone]] at any time with questions or concerns.    Albert Yeo MD [facility]  [facility]    PRP (Platelet Rich Plasma) Post-Procedure Instructions  1. Do not take anti-inflammatories (Motrin, Advil, Naprosyn, Naproxen, Aleve, Indocin, Mobic, Toradol, Voltaren, Arthrotec, Ibuprofen, Diclofenac) for 2 weeks after the procedure  2. You can take Tylenol up to 1000 mg / dose and no more than 3,000 mg / day  3. You will be given a prescription strength pain medication (Norco) to be used for severe pain.  No driving or alcohol while taking narcotics.  4. If pain is persistent after Tylenol and Norco you may apply ice to the affected area for 20 minutes. Limit icing within the first 2 weeks.  5. Plan to rest the remainder of the day after the procedure.  6. A follow-up appointment should be scheduled for 1 week after the procedure.  7. Formal physical therapy, if needed, will begin 1-2 weeks after the procedure.  8. It can take up to 6-8 weeks to determine your full response to the PRP injection      For plantar fasciitis and osteoarthritis  1. You will be in a walking boot for one week.  Gentle range of motion out of the boot is encouraged but all weight bearing must be in the boot.   2. Thereafter walking is allowed but no explosive / weight bearing exercise for up to 6 weeks.

## 2025-04-08 NOTE — LETTER
4/8/2025      Triny Florian  [address] [facility] [address]      Dear Colleague,    Thank you for referring your patient, Triny Florian, to the [facility] [place]. Please see a copy of my visit note below.    Bilateral 3/4 TMT, left 1st TMT, right 1st MTP and right plantar fascia PRP Injection    Diagnoses (preoperative and postoperative): bilateral midfoot OA, Right Hallus limitus and plantar fasciitis    Current Procedure (include preoperative):  Sonographically guided  bilateral bilateral midfoot OA, Right Hallus limitus and plantar fasciitis PRP injection    Current Indication (include preoperative):  Alleviation of pain    Triny has elected to receive a  bilateral foot PRP injection to help with modulation of pain and to promote healing. Sonographic guidance will be used to ensure accurate placement of the medication into the plantar fascia.    PATIENT EDUCATION:  Ready to learn with no apparent learning barriers identified. Learning preferences include listening. Explained diagnosis and treatment plan as well as treatment alternatives. Patient expressed understanding of the content.    Following denial of allergy and review of potential side effects and complications including but not necessarily limited to infection, bleeding, allergic reaction, post-injection flare, local tissue breakdown (including but not limited to potential for skin depigmentation and/or subcutaneous fat atrophy), patient indicated their understanding and agreed to proceed. Written and signed consent was obtained and is scanned into the chart.    PROCEDURE:  The patient was prepped and approximately 60cc of whole blood was withdrawn using a standard sterile technique via antecubiatal access of the arm. The whole blood was processed on location in the [employer] System and approximately 20cc of Platelet Rich Plasma was obtained.        Prior to the procedure, the bilateral  midfoot, right plantar fascia and 1st MTP joints were examined with a 12 MHz linear transducer to visualize the fascia/tendon/joint and determine the approach for the procedure.    Procedure was carried out using sterile technique including Chloraprep and sterile transducer gel. A simple surgical tray was used.    PROCEDURAL PAUSE:  Procedural pause conducted to verify correct patient identity, procedure to be performed, and as applicable, correct side/site, correct patient position, availability of implants, special equipment, or special requirements.    Patient position:  prone and supine    Transducer type: 12 MHz linear array transducer    Approach:  in plane to the transducer    Local Anesthesia: 12 cc of 1% Lidocaine was used to anesthetize the skin taking care not to introduce Lidocaine into the plantar fascia and joints of the foot.    Aspirate:  None    Injectate:  Sonographically guided 22-gauge 1.5-inch needle was directly visualized entering down into bilateral feet.  After confirming needle tip position a solution with total of volume of 6 cc (6 PRP and 0 PPP) was injected into the proximal right plantar fascia in both long and short axis taking care to distribute the volume throughout all areas of proximal plantar fascia.  Total of 2cc of PRP was injected into bilateral 3rd and 4th TMT joints, 3cc of PRP into the left 1st TMT and 3cc of PRP into the right 1st MTP    AFTERCARE:  Patient tolerated the procedure without complication. After a short observation period, patient was discharged under their own power and in excellent condition. They were given a AVS and specific post-procedure instructions were provided.    Follow-up in one week.    Albert Yeo, MD, [facility]      Again, thank you for allowing me to participate in the care of your patient.        Sincerely,        Albert Yeo, MD    Electronically signed

## 2025-04-08 NOTE — PROGRESS NOTES
Bilateral 3/4 TMT, left 1st TMT, right 1st MTP and right plantar fascia PRP Injection    Diagnoses (preoperative and postoperative): bilateral midfoot OA, Right Hallus limitus and plantar fasciitis    Current Procedure (include preoperative):  Sonographically guided  bilateral bilateral midfoot OA, Right Hallus limitus and plantar fasciitis PRP injection    Current Indication (include preoperative):  Alleviation of pain    Triny has elected to receive a  bilateral foot PRP injection to help with modulation of pain and to promote healing. Sonographic guidance will be used to ensure accurate placement of the medication into the plantar fascia.    PATIENT EDUCATION:  Ready to learn with no apparent learning barriers identified. Learning preferences include listening. Explained diagnosis and treatment plan as well as treatment alternatives. Patient expressed understanding of the content.    Following denial of allergy and review of potential side effects and complications including but not necessarily limited to infection, bleeding, allergic reaction, post-injection flare, local tissue breakdown (including but not limited to potential for skin depigmentation and/or subcutaneous fat atrophy), patient indicated their understanding and agreed to proceed. Written and signed consent was obtained and is scanned into the chart.    PROCEDURE:  The patient was prepped and approximately 60cc of whole blood was withdrawn using a standard sterile technique via antecubiatal access of the arm. The whole blood was processed on location in the [employer] System and approximately 20cc of Platelet Rich Plasma was obtained.        Prior to the procedure, the bilateral midfoot, right plantar fascia and 1st MTP joints were examined with a 12 MHz linear transducer to visualize the fascia/tendon/joint and determine the approach for the procedure.    Procedure was carried out using sterile technique including Chloraprep and  sterile transducer gel. A simple surgical tray was used.    PROCEDURAL PAUSE:  Procedural pause conducted to verify correct patient identity, procedure to be performed, and as applicable, correct side/site, correct patient position, availability of implants, special equipment, or special requirements.    Patient position:  prone and supine    Transducer type: 12 MHz linear array transducer    Approach:  in plane to the transducer    Local Anesthesia: 12 cc of 1% Lidocaine was used to anesthetize the skin taking care not to introduce Lidocaine into the plantar fascia and joints of the foot.    Aspirate:  None    Injectate:  Sonographically guided 22-gauge 1.5-inch needle was directly visualized entering down into bilateral feet.  After confirming needle tip position a solution with total of volume of 6 cc (6 PRP and 0 PPP) was injected into the proximal right plantar fascia in both long and short axis taking care to distribute the volume throughout all areas of proximal plantar fascia.  Total of 2cc of PRP was injected into bilateral 3rd and 4th TMT joints, 3cc of PRP into the left 1st TMT and 3cc of PRP into the right 1st MTP    AFTERCARE:  Patient tolerated the procedure without complication. After a short observation period, patient was discharged under their own power and in excellent condition. They were given a AVS and specific post-procedure instructions were provided.    Follow-up in one week.    Albert Yeo, MD, [facility] [place]

## 2025-04-15 ENCOUNTER — VIRTUAL VISIT (OUTPATIENT)
Dept: ORTHOPEDICS | Facility: CLINIC | Age: 55
End: 2025-04-15
Payer: COMMERCIAL

## 2025-04-15 DIAGNOSIS — M20.5X1 HALLUX LIMITUS, RIGHT: ICD-10-CM

## 2025-04-15 DIAGNOSIS — M72.2 PLANTAR FASCIITIS OF RIGHT FOOT: ICD-10-CM

## 2025-04-15 DIAGNOSIS — M19.071 ARTHRITIS OF BOTH MIDFEET: Primary | ICD-10-CM

## 2025-04-15 DIAGNOSIS — M19.072 ARTHRITIS OF BOTH MIDFEET: Primary | ICD-10-CM

## 2025-04-15 PROCEDURE — 98013 SYNCH AUDIO-ONLY EST LOW 20: CPT | Performed by: FAMILY MEDICINE

## 2025-04-15 NOTE — PROGRESS NOTES
Triny is a 55 year old who is being evaluated via a billable telephone visit.    What phone number would you like to be contacted at? [phone]  How would you like to obtain your AVS? Michelle  Originating Location (pt. Location): Home    Distant Location (provider location):  On-site  Telephone visit completed due to the patient did not have access to video, while the distant provider did.  Phone call duration: 8 minutes    ASSESSMENT & PLAN  Patient Instructions     1. Arthritis of both midfeet    2. Plantar fasciitis of right foot    3. Hallux limitus, right      - Patient is following up for chronic bilateral foot pain due to arthritis and right plantar fasciitis  -Patient reports that severe postprocedure pain she was experiencing last week has calmed down and it has returned to the same pain level that she had prior to the procedure  -Patient had completed her hydrocodone medication, we have prescribed Tylenol with codeine to continue with her more significant pain in which she did not need to take it has been taking over-the-counter Tylenol to control her pain  -Discontinued her walking boots earlier this week and reports that the pain and is the same in and out of her boots  -Patient may begin normal day-to-day weightbearing activities as her pain allows  -Traveling in 1 week for medical missions follow-up.  Patient was advised to request a wheelchair while she is in the airport to try to minimize her walking and weightbearing activities as much as possible  -Patient will restart her home exercise program when she returns in approximately 3 to 4 weeks as her pain allows  -Slowly increase her activity after 7 to 8 weeks to allow for the cold inflammatory healing process to take place  -Patient will continue to follow-up with me for further treatment and recommendations.    -Call direct clinic number [[phone]] at any time with questions or concerns.    Albert Yeo MD [facility]  [facility] [place]        PRP (Platelet Rich Plasma) Post-Procedure Instructions  1. Do not take anti-inflammatories (Motrin, Advil, Naprosyn, Naproxen, Aleve, Indocin, Mobic, Toradol, Voltaren, Arthrotec, Ibuprofen, Diclofenac) for 2 weeks after the procedure  2. You can take Tylenol up to 1000 mg / dose and no more than 3,000 mg / day  3. You will be given a prescription strength pain medication (Norco) to be used for severe pain.  No driving or alcohol while taking narcotics.  4. If pain is persistent after Tylenol and Norco you may apply ice to the affected area for 20 minutes. Limit icing within the first 2 weeks.  5. Plan to rest the remainder of the day after the procedure.  6. A follow-up appointment should be scheduled for 1 week after the procedure.  7. Formal physical therapy, if needed, will begin 1-2 weeks after the procedure.  8. It can take up to 6-8 weeks to determine your full response to the PRP injection    For knee osteoarthritis  1. You will be non weight bearing for 3 days but please do gentle range of motion of the joint.  2. Thereafter walking is allowed but no weight bearing exercise for 2 weeks. Swimming and no resistance recumbent bike is allowed during this 2 week period.  4. Weight bearing exercises can begin after 2 weeks depending on your response to treatment.      For achilles tendinopathy/tearing and plantar fasciitis  1. You will be in a walking boot for one week.  Gentle range of motion out of the boot is encouraged but all weight bearing must be in the boot.   2. Thereafter walking is allowed but no explosive / weight bearing exercise for up to 6 weeks.      -----    SUBJECTIVE:  Triny Florian is a 55 year old female who is seen in follow-up for bilateral foot pain. Patient was last seen on 04/08/2025 and had bilateral 3/4 TMT, left 1st TMT, right 1st MTP and right plantar fascia PRP Injection.    Since their last visit reports 0% - (About the same  as last time). They indicate that their current pain level is 4/10. They have tried rest/activity avoidance, other medications: Hydrocodone/Acetaminophen (Vicodin/Norco) and Tylenol #3 (Tylenol with Codeine), and casting/splinting/bracing.      The patient is seen by themselves.    Patient's past medical, surgical, social, and family histories were reviewed today and no changes are noted.    REVIEW OF SYSTEMS:  Constitutional: NEGATIVE for fever, chills, change in weight  Skin: NEGATIVE for worrisome rashes, moles or lesions  GI/: NEGATIVE for bowel or bladder changes  Neuro: NEGATIVE for weakness, dizziness or paresthesias    OBJECTIVE:  There were no vitals taken for this visit.   General: healthy, alert and in no distress  HEENT: no scleral icterus or conjunctival erythema  Skin: no suspicious lesions or rash. No jaundice.  CV: regular rhythm by palpation, no pedal edema  Resp: normal respiratory effort without conversational dyspnea   Psych: normal mood and affect  Gait: normal steady gait with appropriate coordination and balance  Neuro: normal light touch sensory exam of the extremities.    MSK:    Independent visualization of the below image:    Patient's conditions were thoroughly discussed during today's visit with total time spent face-to-face with the patient and documentation being 10 minutes.    Albert Yeo MD, [facility]

## 2025-04-15 NOTE — PATIENT INSTRUCTIONS
1. Arthritis of both midfeet    2. Plantar fasciitis of right foot    3. Hallux limitus, right      - Patient is following up for chronic bilateral foot pain due to arthritis and right plantar fasciitis  -Patient reports that severe postprocedure pain she was experiencing last week has calmed down and it has returned to the same pain level that she had prior to the procedure  -Patient had completed her hydrocodone medication, we have prescribed Tylenol with codeine to continue with her more significant pain in which she did not need to take it has been taking over-the-counter Tylenol to control her pain  -Discontinued her walking boots earlier this week and reports that the pain and is the same in and out of her boots  -Patient may begin normal day-to-day weightbearing activities as her pain allows  -Traveling in 1 week for medical missions follow-up.  Patient was advised to request a wheelchair while she is in the airport to try to minimize her walking and weightbearing activities as much as possible  -Patient will restart her home exercise program when she returns in approximately 3 to 4 weeks as her pain allows  -Slowly increase her activity after 7 to 8 weeks to allow for the cold inflammatory healing process to take place  -Patient will continue to follow-up with me for further treatment and recommendations.    -Call direct clinic number [[phone]] at any time with questions or concerns.    Albert Yeo MD [facility] Medicine  [facility] [place]        PRP (Platelet Rich Plasma) Post-Procedure Instructions  1. Do not take anti-inflammatories (Motrin, Advil, Naprosyn, Naproxen, Aleve, Indocin, Mobic, Toradol, Voltaren, Arthrotec, Ibuprofen, Diclofenac) for 2 weeks after the procedure  2. You can take Tylenol up to 1000 mg / dose and no more than 3,000 mg / day  3. You will be given a prescription strength pain medication (Norco) to be used for severe pain.  No driving or  alcohol while taking narcotics.  4. If pain is persistent after Tylenol and Norco you may apply ice to the affected area for 20 minutes. Limit icing within the first 2 weeks.  5. Plan to rest the remainder of the day after the procedure.  6. A follow-up appointment should be scheduled for 1 week after the procedure.  7. Formal physical therapy, if needed, will begin 1-2 weeks after the procedure.  8. It can take up to 6-8 weeks to determine your full response to the PRP injection    For knee osteoarthritis  1. You will be non weight bearing for 3 days but please do gentle range of motion of the joint.  2. Thereafter walking is allowed but no weight bearing exercise for 2 weeks. Swimming and no resistance recumbent bike is allowed during this 2 week period.  4. Weight bearing exercises can begin after 2 weeks depending on your response to treatment.      For achilles tendinopathy/tearing and plantar fasciitis  1. You will be in a walking boot for one week.  Gentle range of motion out of the boot is encouraged but all weight bearing must be in the boot.   2. Thereafter walking is allowed but no explosive / weight bearing exercise for up to 6 weeks.

## 2025-04-15 NOTE — LETTER
4/15/2025      Triny Florian  [address] [facility] [address]      Dear Colleague,    Thank you for referring your patient, Triny Florian, to the [facility] [place]. Please see a copy of my visit note below.    Triny is a 55 year old who is being evaluated via a billable telephone visit.    What phone number would you like to be contacted at? [phone]  How would you like to obtain your AVS? MyChart  Originating Location (pt. Location): Home    Distant Location (provider location):  On-site  Telephone visit completed due to the patient did not have access to video, while the distant provider did.  Phone call duration: 8 minutes    ASSESSMENT & PLAN  Patient Instructions     1. Arthritis of both midfeet    2. Plantar fasciitis of right foot    3. Hallux limitus, right      - Patient is following up for chronic bilateral foot pain due to arthritis and right plantar fasciitis  -Patient reports that severe postprocedure pain she was experiencing last week has calmed down and it has returned to the same pain level that she had prior to the procedure  -Patient had completed her hydrocodone medication, we have prescribed Tylenol with codeine to continue with her more significant pain in which she did not need to take it has been taking over-the-counter Tylenol to control her pain  -Discontinued her walking boots earlier this week and reports that the pain and is the same in and out of her boots  -Patient may begin normal day-to-day weightbearing activities as her pain allows  -Traveling in 1 week for medical missions follow-up.  Patient was advised to request a wheelchair while she is in the airport to try to minimize her walking and weightbearing activities as much as possible  -Patient will restart her home exercise program when she returns in approximately 3 to 4 weeks as her pain allows  -Slowly increase her activity after 7 to 8 weeks to allow for the cold  inflammatory healing process to take place  -Patient will continue to follow-up with me for further treatment and recommendations.    -Call direct clinic number [[phone]] at any time with questions or concerns.    Albert Yeo MD [facility]  [facility]        PRP (Platelet Rich Plasma) Post-Procedure Instructions  1. Do not take anti-inflammatories (Motrin, Advil, Naprosyn, Naproxen, Aleve, Indocin, Mobic, Toradol, Voltaren, Arthrotec, Ibuprofen, Diclofenac) for 2 weeks after the procedure  2. You can take Tylenol up to 1000 mg / dose and no more than 3,000 mg / day  3. You will be given a prescription strength pain medication (Norco) to be used for severe pain.  No driving or alcohol while taking narcotics.  4. If pain is persistent after Tylenol and Norco you may apply ice to the affected area for 20 minutes. Limit icing within the first 2 weeks.  5. Plan to rest the remainder of the day after the procedure.  6. A follow-up appointment should be scheduled for 1 week after the procedure.  7. Formal physical therapy, if needed, will begin 1-2 weeks after the procedure.  8. It can take up to 6-8 weeks to determine your full response to the PRP injection    For knee osteoarthritis  1. You will be non weight bearing for 3 days but please do gentle range of motion of the joint.  2. Thereafter walking is allowed but no weight bearing exercise for 2 weeks. Swimming and no resistance recumbent bike is allowed during this 2 week period.  4. Weight bearing exercises can begin after 2 weeks depending on your response to treatment.      For achilles tendinopathy/tearing and plantar fasciitis  1. You will be in a walking boot for one week.  Gentle range of motion out of the boot is encouraged but all weight bearing must be in the boot.   2. Thereafter walking is allowed but no explosive / weight bearing exercise for up to 6 weeks.      -----    SUBJECTIVE:  Triny TARANGO  Anival is a 55 year old female who is seen in follow-up for bilateral foot pain. Patient was last seen on 04/08/2025 and had bilateral 3/4 TMT, left 1st TMT, right 1st MTP and right plantar fascia PRP Injection.    Since their last visit reports 0% - (About the same as last time). They indicate that their current pain level is 4/10. They have tried rest/activity avoidance, other medications: Hydrocodone/Acetaminophen (Vicodin/Norco) and Tylenol #3 (Tylenol with Codeine), and casting/splinting/bracing.      The patient is seen by themselves.    Patient's past medical, surgical, social, and family histories were reviewed today and no changes are noted.    REVIEW OF SYSTEMS:  Constitutional: NEGATIVE for fever, chills, change in weight  Skin: NEGATIVE for worrisome rashes, moles or lesions  GI/: NEGATIVE for bowel or bladder changes  Neuro: NEGATIVE for weakness, dizziness or paresthesias    OBJECTIVE:  There were no vitals taken for this visit.   General: healthy, alert and in no distress  HEENT: no scleral icterus or conjunctival erythema  Skin: no suspicious lesions or rash. No jaundice.  CV: regular rhythm by palpation, no pedal edema  Resp: normal respiratory effort without conversational dyspnea   Psych: normal mood and affect  Gait: normal steady gait with appropriate coordination and balance  Neuro: normal light touch sensory exam of the extremities.    MSK:    Independent visualization of the below image:    Patient's conditions were thoroughly discussed during today's visit with total time spent face-to-face with the patient and documentation being 10 minutes.    Albert Yeo MD, [facility]        Again, thank you for allowing me to participate in the care of your patient.        Sincerely,        Albert Yeo, MD    Electronically signed

## 2025-08-18 ENCOUNTER — PATIENT OUTREACH (OUTPATIENT)
Dept: CARE COORDINATION | Facility: CLINIC | Age: 55
End: 2025-08-18
Payer: COMMERCIAL

## (undated) RX ORDER — METHYLPREDNISOLONE ACETATE 40 MG/ML
INJECTION, SUSPENSION INTRA-ARTICULAR; INTRALESIONAL; INTRAMUSCULAR; SOFT TISSUE
Status: DISPENSED
Start: 2023-05-19

## (undated) RX ORDER — LIDOCAINE HYDROCHLORIDE 10 MG/ML
INJECTION, SOLUTION EPIDURAL; INFILTRATION; INTRACAUDAL; PERINEURAL
Status: DISPENSED
Start: 2023-05-19